# Patient Record
Sex: FEMALE | Race: WHITE
[De-identification: names, ages, dates, MRNs, and addresses within clinical notes are randomized per-mention and may not be internally consistent; named-entity substitution may affect disease eponyms.]

---

## 2021-07-14 ENCOUNTER — TRANSCRIPTION ENCOUNTER (OUTPATIENT)
Age: 61
End: 2021-07-14

## 2022-10-30 ENCOUNTER — INPATIENT (INPATIENT)
Facility: HOSPITAL | Age: 62
LOS: 5 days | Discharge: NURSING, REHAB OR LONG-TERM CARE | DRG: 53 | End: 2022-11-05
Attending: STUDENT IN AN ORGANIZED HEALTH CARE EDUCATION/TRAINING PROGRAM | Admitting: STUDENT IN AN ORGANIZED HEALTH CARE EDUCATION/TRAINING PROGRAM
Payer: COMMERCIAL

## 2022-10-30 VITALS
SYSTOLIC BLOOD PRESSURE: 137 MMHG | RESPIRATION RATE: 18 BRPM | DIASTOLIC BLOOD PRESSURE: 78 MMHG | TEMPERATURE: 99 F | OXYGEN SATURATION: 95 % | HEART RATE: 78 BPM

## 2022-10-30 DIAGNOSIS — Z98.890 OTHER SPECIFIED POSTPROCEDURAL STATES: Chronic | ICD-10-CM

## 2022-10-30 DIAGNOSIS — Z90.49 ACQUIRED ABSENCE OF OTHER SPECIFIED PARTS OF DIGESTIVE TRACT: Chronic | ICD-10-CM

## 2022-10-30 DIAGNOSIS — S19.80XA OTHER SPECIFIED INJURIES OF UNSPECIFIED PART OF NECK, INITIAL ENCOUNTER: ICD-10-CM

## 2022-10-30 DIAGNOSIS — Z90.89 ACQUIRED ABSENCE OF OTHER ORGANS: Chronic | ICD-10-CM

## 2022-10-30 LAB
ABO RH CONFIRMATION: SIGNIFICANT CHANGE UP
ALBUMIN SERPL ELPH-MCNC: 3.8 G/DL — SIGNIFICANT CHANGE UP (ref 3.3–5.2)
ALP SERPL-CCNC: 89 U/L — SIGNIFICANT CHANGE UP (ref 40–120)
ALT FLD-CCNC: 23 U/L — SIGNIFICANT CHANGE UP
ANION GAP SERPL CALC-SCNC: 10 MMOL/L — SIGNIFICANT CHANGE UP (ref 5–17)
ANION GAP SERPL CALC-SCNC: 13 MMOL/L — SIGNIFICANT CHANGE UP (ref 5–17)
APPEARANCE UR: CLEAR — SIGNIFICANT CHANGE UP
APTT BLD: 34 SEC — SIGNIFICANT CHANGE UP (ref 27.5–35.5)
AST SERPL-CCNC: 22 U/L — SIGNIFICANT CHANGE UP
BACTERIA # UR AUTO: ABNORMAL
BASOPHILS # BLD AUTO: 0.02 K/UL — SIGNIFICANT CHANGE UP (ref 0–0.2)
BASOPHILS # BLD AUTO: 0.04 K/UL — SIGNIFICANT CHANGE UP (ref 0–0.2)
BASOPHILS NFR BLD AUTO: 0.2 % — SIGNIFICANT CHANGE UP (ref 0–2)
BASOPHILS NFR BLD AUTO: 0.4 % — SIGNIFICANT CHANGE UP (ref 0–2)
BILIRUB SERPL-MCNC: 0.3 MG/DL — LOW (ref 0.4–2)
BILIRUB UR-MCNC: NEGATIVE — SIGNIFICANT CHANGE UP
BLD GP AB SCN SERPL QL: SIGNIFICANT CHANGE UP
BUN SERPL-MCNC: 10.6 MG/DL — SIGNIFICANT CHANGE UP (ref 8–20)
BUN SERPL-MCNC: 13.9 MG/DL — SIGNIFICANT CHANGE UP (ref 8–20)
CALCIUM SERPL-MCNC: 8.6 MG/DL — SIGNIFICANT CHANGE UP (ref 8.4–10.5)
CALCIUM SERPL-MCNC: 8.6 MG/DL — SIGNIFICANT CHANGE UP (ref 8.4–10.5)
CHLORIDE SERPL-SCNC: 104 MMOL/L — SIGNIFICANT CHANGE UP (ref 96–108)
CHLORIDE SERPL-SCNC: 105 MMOL/L — SIGNIFICANT CHANGE UP (ref 96–108)
CO2 SERPL-SCNC: 22 MMOL/L — SIGNIFICANT CHANGE UP (ref 22–29)
CO2 SERPL-SCNC: 24 MMOL/L — SIGNIFICANT CHANGE UP (ref 22–29)
COLOR SPEC: YELLOW — SIGNIFICANT CHANGE UP
COMMENT - URINE: SIGNIFICANT CHANGE UP
CREAT SERPL-MCNC: 0.57 MG/DL — SIGNIFICANT CHANGE UP (ref 0.5–1.3)
CREAT SERPL-MCNC: 0.6 MG/DL — SIGNIFICANT CHANGE UP (ref 0.5–1.3)
DIFF PNL FLD: ABNORMAL
EGFR: 100 ML/MIN/1.73M2 — SIGNIFICANT CHANGE UP
EGFR: 101 ML/MIN/1.73M2 — SIGNIFICANT CHANGE UP
EOSINOPHIL # BLD AUTO: 0.04 K/UL — SIGNIFICANT CHANGE UP (ref 0–0.5)
EOSINOPHIL # BLD AUTO: 0.05 K/UL — SIGNIFICANT CHANGE UP (ref 0–0.5)
EOSINOPHIL NFR BLD AUTO: 0.4 % — SIGNIFICANT CHANGE UP (ref 0–6)
EOSINOPHIL NFR BLD AUTO: 0.5 % — SIGNIFICANT CHANGE UP (ref 0–6)
FLUAV AG NPH QL: SIGNIFICANT CHANGE UP
FLUBV AG NPH QL: SIGNIFICANT CHANGE UP
GLUCOSE SERPL-MCNC: 119 MG/DL — HIGH (ref 70–99)
GLUCOSE SERPL-MCNC: 98 MG/DL — SIGNIFICANT CHANGE UP (ref 70–99)
GLUCOSE UR QL: NEGATIVE MG/DL — SIGNIFICANT CHANGE UP
HCT VFR BLD CALC: 38.3 % — SIGNIFICANT CHANGE UP (ref 34.5–45)
HCT VFR BLD CALC: 40.4 % — SIGNIFICANT CHANGE UP (ref 34.5–45)
HGB BLD-MCNC: 12.9 G/DL — SIGNIFICANT CHANGE UP (ref 11.5–15.5)
HGB BLD-MCNC: 13.6 G/DL — SIGNIFICANT CHANGE UP (ref 11.5–15.5)
IMM GRANULOCYTES NFR BLD AUTO: 0.2 % — SIGNIFICANT CHANGE UP (ref 0–0.9)
IMM GRANULOCYTES NFR BLD AUTO: 0.4 % — SIGNIFICANT CHANGE UP (ref 0–0.9)
INR BLD: 1.01 RATIO — SIGNIFICANT CHANGE UP (ref 0.88–1.16)
KETONES UR-MCNC: NEGATIVE — SIGNIFICANT CHANGE UP
LEUKOCYTE ESTERASE UR-ACNC: NEGATIVE — SIGNIFICANT CHANGE UP
LYMPHOCYTES # BLD AUTO: 1.87 K/UL — SIGNIFICANT CHANGE UP (ref 1–3.3)
LYMPHOCYTES # BLD AUTO: 16.6 % — SIGNIFICANT CHANGE UP (ref 13–44)
LYMPHOCYTES # BLD AUTO: 2.29 K/UL — SIGNIFICANT CHANGE UP (ref 1–3.3)
LYMPHOCYTES # BLD AUTO: 23.3 % — SIGNIFICANT CHANGE UP (ref 13–44)
MCHC RBC-ENTMCNC: 30.6 PG — SIGNIFICANT CHANGE UP (ref 27–34)
MCHC RBC-ENTMCNC: 31.2 PG — SIGNIFICANT CHANGE UP (ref 27–34)
MCHC RBC-ENTMCNC: 33.7 GM/DL — SIGNIFICANT CHANGE UP (ref 32–36)
MCHC RBC-ENTMCNC: 33.7 GM/DL — SIGNIFICANT CHANGE UP (ref 32–36)
MCV RBC AUTO: 91 FL — SIGNIFICANT CHANGE UP (ref 80–100)
MCV RBC AUTO: 92.5 FL — SIGNIFICANT CHANGE UP (ref 80–100)
MONOCYTES # BLD AUTO: 0.5 K/UL — SIGNIFICANT CHANGE UP (ref 0–0.9)
MONOCYTES # BLD AUTO: 0.77 K/UL — SIGNIFICANT CHANGE UP (ref 0–0.9)
MONOCYTES NFR BLD AUTO: 4.4 % — SIGNIFICANT CHANGE UP (ref 2–14)
MONOCYTES NFR BLD AUTO: 7.8 % — SIGNIFICANT CHANGE UP (ref 2–14)
NEUTROPHILS # BLD AUTO: 6.69 K/UL — SIGNIFICANT CHANGE UP (ref 1.8–7.4)
NEUTROPHILS # BLD AUTO: 8.78 K/UL — HIGH (ref 1.8–7.4)
NEUTROPHILS NFR BLD AUTO: 68 % — SIGNIFICANT CHANGE UP (ref 43–77)
NEUTROPHILS NFR BLD AUTO: 77.8 % — HIGH (ref 43–77)
NITRITE UR-MCNC: NEGATIVE — SIGNIFICANT CHANGE UP
PH UR: 5 — SIGNIFICANT CHANGE UP (ref 5–8)
PLATELET # BLD AUTO: 285 K/UL — SIGNIFICANT CHANGE UP (ref 150–400)
PLATELET # BLD AUTO: 309 K/UL — SIGNIFICANT CHANGE UP (ref 150–400)
POTASSIUM SERPL-MCNC: 4.3 MMOL/L — SIGNIFICANT CHANGE UP (ref 3.5–5.3)
POTASSIUM SERPL-MCNC: 4.5 MMOL/L — SIGNIFICANT CHANGE UP (ref 3.5–5.3)
POTASSIUM SERPL-SCNC: 4.3 MMOL/L — SIGNIFICANT CHANGE UP (ref 3.5–5.3)
POTASSIUM SERPL-SCNC: 4.5 MMOL/L — SIGNIFICANT CHANGE UP (ref 3.5–5.3)
PROT SERPL-MCNC: 6.3 G/DL — LOW (ref 6.6–8.7)
PROT UR-MCNC: NEGATIVE — SIGNIFICANT CHANGE UP
PROTHROM AB SERPL-ACNC: 11.7 SEC — SIGNIFICANT CHANGE UP (ref 10.5–13.4)
RBC # BLD: 4.14 M/UL — SIGNIFICANT CHANGE UP (ref 3.8–5.2)
RBC # BLD: 4.44 M/UL — SIGNIFICANT CHANGE UP (ref 3.8–5.2)
RBC # FLD: 13.2 % — SIGNIFICANT CHANGE UP (ref 10.3–14.5)
RBC # FLD: 13.3 % — SIGNIFICANT CHANGE UP (ref 10.3–14.5)
RBC CASTS # UR COMP ASSIST: SIGNIFICANT CHANGE UP /HPF (ref 0–4)
RSV RNA NPH QL NAA+NON-PROBE: SIGNIFICANT CHANGE UP
SARS-COV-2 RNA SPEC QL NAA+PROBE: SIGNIFICANT CHANGE UP
SODIUM SERPL-SCNC: 139 MMOL/L — SIGNIFICANT CHANGE UP (ref 135–145)
SODIUM SERPL-SCNC: 139 MMOL/L — SIGNIFICANT CHANGE UP (ref 135–145)
SP GR SPEC: 1.02 — SIGNIFICANT CHANGE UP (ref 1.01–1.02)
UROBILINOGEN FLD QL: 1 MG/DL
WBC # BLD: 11.27 K/UL — HIGH (ref 3.8–10.5)
WBC # BLD: 9.84 K/UL — SIGNIFICANT CHANGE UP (ref 3.8–10.5)
WBC # FLD AUTO: 11.27 K/UL — HIGH (ref 3.8–10.5)
WBC # FLD AUTO: 9.84 K/UL — SIGNIFICANT CHANGE UP (ref 3.8–10.5)
WBC UR QL: SIGNIFICANT CHANGE UP /HPF (ref 0–5)

## 2022-10-30 PROCEDURE — 12011 RPR F/E/E/N/L/M 2.5 CM/<: CPT

## 2022-10-30 PROCEDURE — 99221 1ST HOSP IP/OBS SF/LOW 40: CPT

## 2022-10-30 PROCEDURE — 99285 EMERGENCY DEPT VISIT HI MDM: CPT | Mod: 25

## 2022-10-30 PROCEDURE — 99232 SBSQ HOSP IP/OBS MODERATE 35: CPT

## 2022-10-30 PROCEDURE — 73562 X-RAY EXAM OF KNEE 3: CPT | Mod: 26,RT

## 2022-10-30 PROCEDURE — 72141 MRI NECK SPINE W/O DYE: CPT | Mod: 26

## 2022-10-30 RX ORDER — SODIUM CHLORIDE 9 MG/ML
1000 INJECTION, SOLUTION INTRAVENOUS ONCE
Refills: 0 | Status: COMPLETED | OUTPATIENT
Start: 2022-10-30 | End: 2022-10-30

## 2022-10-30 RX ORDER — KETOROLAC TROMETHAMINE 30 MG/ML
10 SYRINGE (ML) INJECTION EVERY 6 HOURS
Refills: 0 | Status: DISCONTINUED | OUTPATIENT
Start: 2022-10-30 | End: 2022-10-30

## 2022-10-30 RX ORDER — OXYCODONE HYDROCHLORIDE 5 MG/1
5 TABLET ORAL EVERY 4 HOURS
Refills: 0 | Status: DISCONTINUED | OUTPATIENT
Start: 2022-10-30 | End: 2022-11-03

## 2022-10-30 RX ORDER — SODIUM CHLORIDE 9 MG/ML
500 INJECTION INTRAMUSCULAR; INTRAVENOUS; SUBCUTANEOUS ONCE
Refills: 0 | Status: COMPLETED | OUTPATIENT
Start: 2022-10-30 | End: 2022-10-30

## 2022-10-30 RX ORDER — HYDROMORPHONE HYDROCHLORIDE 2 MG/ML
1 INJECTION INTRAMUSCULAR; INTRAVENOUS; SUBCUTANEOUS ONCE
Refills: 0 | Status: DISCONTINUED | OUTPATIENT
Start: 2022-10-30 | End: 2022-10-30

## 2022-10-30 RX ORDER — CHLORHEXIDINE GLUCONATE 213 G/1000ML
1 SOLUTION TOPICAL DAILY
Refills: 0 | Status: DISCONTINUED | OUTPATIENT
Start: 2022-10-30 | End: 2022-11-02

## 2022-10-30 RX ORDER — OXYCODONE HYDROCHLORIDE 5 MG/1
5 TABLET ORAL EVERY 6 HOURS
Refills: 0 | Status: DISCONTINUED | OUTPATIENT
Start: 2022-10-30 | End: 2022-10-30

## 2022-10-30 RX ORDER — ONDANSETRON 8 MG/1
4 TABLET, FILM COATED ORAL ONCE
Refills: 0 | Status: COMPLETED | OUTPATIENT
Start: 2022-10-30 | End: 2022-10-30

## 2022-10-30 RX ORDER — METOCLOPRAMIDE HCL 10 MG
10 TABLET ORAL ONCE
Refills: 0 | Status: COMPLETED | OUTPATIENT
Start: 2022-10-30 | End: 2022-10-30

## 2022-10-30 RX ORDER — HYDROMORPHONE HYDROCHLORIDE 2 MG/ML
0.5 INJECTION INTRAMUSCULAR; INTRAVENOUS; SUBCUTANEOUS
Refills: 0 | Status: DISCONTINUED | OUTPATIENT
Start: 2022-10-30 | End: 2022-10-31

## 2022-10-30 RX ORDER — ENOXAPARIN SODIUM 100 MG/ML
40 INJECTION SUBCUTANEOUS EVERY 24 HOURS
Refills: 0 | Status: DISCONTINUED | OUTPATIENT
Start: 2022-10-30 | End: 2022-11-05

## 2022-10-30 RX ORDER — OXYCODONE HYDROCHLORIDE 5 MG/1
10 TABLET ORAL EVERY 4 HOURS
Refills: 0 | Status: DISCONTINUED | OUTPATIENT
Start: 2022-10-30 | End: 2022-11-03

## 2022-10-30 RX ORDER — GABAPENTIN 400 MG/1
300 CAPSULE ORAL THREE TIMES A DAY
Refills: 0 | Status: DISCONTINUED | OUTPATIENT
Start: 2022-10-30 | End: 2022-10-31

## 2022-10-30 RX ORDER — ACETAMINOPHEN 500 MG
975 TABLET ORAL EVERY 8 HOURS
Refills: 0 | Status: DISCONTINUED | OUTPATIENT
Start: 2022-10-30 | End: 2022-11-03

## 2022-10-30 RX ORDER — METHOCARBAMOL 500 MG/1
750 TABLET, FILM COATED ORAL EVERY 8 HOURS
Refills: 0 | Status: DISCONTINUED | OUTPATIENT
Start: 2022-10-30 | End: 2022-11-05

## 2022-10-30 RX ORDER — CHLORHEXIDINE GLUCONATE 213 G/1000ML
1 SOLUTION TOPICAL
Refills: 0 | Status: DISCONTINUED | OUTPATIENT
Start: 2022-10-30 | End: 2022-10-30

## 2022-10-30 RX ORDER — SENNA PLUS 8.6 MG/1
2 TABLET ORAL AT BEDTIME
Refills: 0 | Status: DISCONTINUED | OUTPATIENT
Start: 2022-10-30 | End: 2022-11-05

## 2022-10-30 RX ORDER — INFLUENZA VIRUS VACCINE 15; 15; 15; 15 UG/.5ML; UG/.5ML; UG/.5ML; UG/.5ML
0.5 SUSPENSION INTRAMUSCULAR ONCE
Refills: 0 | Status: COMPLETED | OUTPATIENT
Start: 2022-10-30 | End: 2022-11-05

## 2022-10-30 RX ADMIN — ONDANSETRON 4 MILLIGRAM(S): 8 TABLET, FILM COATED ORAL at 04:38

## 2022-10-30 RX ADMIN — SENNA PLUS 2 TABLET(S): 8.6 TABLET ORAL at 22:07

## 2022-10-30 RX ADMIN — OXYCODONE HYDROCHLORIDE 5 MILLIGRAM(S): 5 TABLET ORAL at 11:36

## 2022-10-30 RX ADMIN — HYDROMORPHONE HYDROCHLORIDE 1 MILLIGRAM(S): 2 INJECTION INTRAMUSCULAR; INTRAVENOUS; SUBCUTANEOUS at 02:07

## 2022-10-30 RX ADMIN — SODIUM CHLORIDE 500 MILLILITER(S): 9 INJECTION INTRAMUSCULAR; INTRAVENOUS; SUBCUTANEOUS at 16:06

## 2022-10-30 RX ADMIN — GABAPENTIN 300 MILLIGRAM(S): 400 CAPSULE ORAL at 18:01

## 2022-10-30 RX ADMIN — HYDROMORPHONE HYDROCHLORIDE 0.5 MILLIGRAM(S): 2 INJECTION INTRAMUSCULAR; INTRAVENOUS; SUBCUTANEOUS at 22:40

## 2022-10-30 RX ADMIN — ONDANSETRON 4 MILLIGRAM(S): 8 TABLET, FILM COATED ORAL at 02:34

## 2022-10-30 RX ADMIN — HYDROMORPHONE HYDROCHLORIDE 0.5 MILLIGRAM(S): 2 INJECTION INTRAMUSCULAR; INTRAVENOUS; SUBCUTANEOUS at 22:08

## 2022-10-30 RX ADMIN — METHOCARBAMOL 750 MILLIGRAM(S): 500 TABLET, FILM COATED ORAL at 22:07

## 2022-10-30 RX ADMIN — GABAPENTIN 300 MILLIGRAM(S): 400 CAPSULE ORAL at 22:08

## 2022-10-30 RX ADMIN — SODIUM CHLORIDE 1000 MILLILITER(S): 9 INJECTION, SOLUTION INTRAVENOUS at 11:45

## 2022-10-30 RX ADMIN — METHOCARBAMOL 750 MILLIGRAM(S): 500 TABLET, FILM COATED ORAL at 16:55

## 2022-10-30 RX ADMIN — HYDROMORPHONE HYDROCHLORIDE 1 MILLIGRAM(S): 2 INJECTION INTRAMUSCULAR; INTRAVENOUS; SUBCUTANEOUS at 08:46

## 2022-10-30 RX ADMIN — ENOXAPARIN SODIUM 40 MILLIGRAM(S): 100 INJECTION SUBCUTANEOUS at 22:08

## 2022-10-30 RX ADMIN — OXYCODONE HYDROCHLORIDE 5 MILLIGRAM(S): 5 TABLET ORAL at 12:06

## 2022-10-30 RX ADMIN — HYDROMORPHONE HYDROCHLORIDE 0.5 MILLIGRAM(S): 2 INJECTION INTRAMUSCULAR; INTRAVENOUS; SUBCUTANEOUS at 14:30

## 2022-10-30 RX ADMIN — HYDROMORPHONE HYDROCHLORIDE 1 MILLIGRAM(S): 2 INJECTION INTRAMUSCULAR; INTRAVENOUS; SUBCUTANEOUS at 04:06

## 2022-10-30 RX ADMIN — HYDROMORPHONE HYDROCHLORIDE 0.5 MILLIGRAM(S): 2 INJECTION INTRAMUSCULAR; INTRAVENOUS; SUBCUTANEOUS at 13:50

## 2022-10-30 RX ADMIN — Medication 975 MILLIGRAM(S): at 13:13

## 2022-10-30 RX ADMIN — HYDROMORPHONE HYDROCHLORIDE 0.5 MILLIGRAM(S): 2 INJECTION INTRAMUSCULAR; INTRAVENOUS; SUBCUTANEOUS at 18:16

## 2022-10-30 RX ADMIN — HYDROMORPHONE HYDROCHLORIDE 0.5 MILLIGRAM(S): 2 INJECTION INTRAMUSCULAR; INTRAVENOUS; SUBCUTANEOUS at 18:01

## 2022-10-30 RX ADMIN — Medication 10 MILLIGRAM(S): at 03:09

## 2022-10-30 RX ADMIN — CHLORHEXIDINE GLUCONATE 1 APPLICATION(S): 213 SOLUTION TOPICAL at 22:10

## 2022-10-30 RX ADMIN — HYDROMORPHONE HYDROCHLORIDE 1 MILLIGRAM(S): 2 INJECTION INTRAMUSCULAR; INTRAVENOUS; SUBCUTANEOUS at 07:52

## 2022-10-30 NOTE — H&P ADULT - ASSESSMENT
A: 63 yo F transfer from [facility] , s/p fall in drive way. Pan scan negative for any acute traumatic injuries.However, she complains of RUE numbness and tingling in bilateral hands. MRI completed, pending final read. She is noted to have a small mucosal lip laceration s/p repair by ED.     Plan:   - Follow up MRI, final read   - NSGY consulted, follow up recommendation  - Hemodynamic monitoring   - Final recommendations, pending further imaging

## 2022-10-30 NOTE — CONSULT NOTE ADULT - NS PANP COMMENT GEN_ALL_CORE FT
The injury and MRI findings were explained in details to the pt. Also the management plan was made clear to her.. I will close follow.

## 2022-10-30 NOTE — ED ADULT NURSE NOTE - OBJECTIVE STATEMENT
Assumed care of pt at 0120. Pt A&Ox4 c/o bilateral arm/thumb/wrist pain, the pt is a transfer from [facility], the pt states that she was walking when she tripped and fell landing on her right hand, the pt complains of a burning pain from her right wrist down to her fingers, the pt denies any LOC/N/V/D/CP/SOB, the pt has an abrasion to the bridge of the nose, the pt received 75mcg fentanyl, 0.5mg Dilaudid, the pt was sent here for an MRI,

## 2022-10-30 NOTE — H&P ADULT - HISTORY OF PRESENT ILLNESS
Trauma Surgery     Consulting attending: Dr. Chakraborty       HPI: Ms. Scott is a 63 yo F who presented to ED s/p mechanical GLF while in her driveway. Patient hit her face on the ground, landing face forward on her arm. Patient denies any LOC. + Headstrike. Not on ASA/AC. She presented to [facility] [place] [place] with complaints of bilateral numbness/tingling in hands, as well as RLE numbness/weakness (she does endorse previous  radiculopathy s/p melanoma excision). CT head/max/face/ A/P negative for any acute traumatic injuries. CT C-spine demonstrates C4/C5 spinal stenosis. Patient was transferred to [facility] for MRI, and further evaluation. On presentation, patient primary intact, GCS 15. HDS. She is noted to have a small mucosal lip laceration s/p repair by EDm sensory defect in bilateral hands. Otherwise, intact. MRI completed, pending final read. NSGY consulted for evaluation.         PAST MEDICAL HISTORY:  Melanoma s/p excision   Radiculopathy           PAST SURGICAL HISTORY:  Melanoma excision   [place]    MEDICATIONS:        ALLERGIES:  No Known Allergies

## 2022-10-30 NOTE — H&P ADULT - NSHPLABSRESULTS_GEN_ALL_CORE
LABS:                        13.6   11.27 )-----------( 309      ( 30 Oct 2022 01:42 )             40.4     10-30    139  |  104  |  13.9  ----------------------------<  119<H>  4.3   |  22.0  |  0.60    Ca    8.6      30 Oct 2022 01:42    TPro  6.3<L>  /  Alb  3.8  /  TBili  0.3<L>  /  DBili  x   /  AST  22  /  ALT  23  /  AlkPhos  89  10-30    Lactate:    PT/INR - ( 30 Oct 2022 01:42 )   PT: 11.7 sec;   INR: 1.01 ratio         PTT - ( 30 Oct 2022 01:42 )  PTT:34.0 sec                IMAGING:

## 2022-10-30 NOTE — CHART NOTE - NSCHARTNOTEFT_GEN_A_CORE
Neurosurgery    Pt seen with DR Bullock.     S/p fall with immediate sensational changes to both upper extremities, that has improved but now has extreme burning to both hands & sensitive to touch & movement.     MR Cervical Spine No Cont (10.30.22 @ 03:56)   Cord compression at C3/C4 and C4/C5 levels without cord signal   abnormality on T2 sagittal.  Likely Modic endplate degenerative signal   changes at C4/C5 level.     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     1. Trauma ICU admit  2. Keep MAP > 80, preferably between 80 & 85  3. Neuro checks Q 1 hour  4. Cervical collar ordered. Will be properly fit & new collar placed by orthotist

## 2022-10-30 NOTE — ED ADULT NURSE NOTE - CHIEF COMPLAINT QUOTE
patient transfer from [facility] [place], states that she was walking when she tripped and fell landing on her right hand. patient with complaints of a burning pain from her right wrist down to her fingers. patient denies any LOC. patient with abrasion to bridge of nose, received 75mcg fentanyl, 0.5mg dilaudid. Patient sent for a STAT MRI.

## 2022-10-30 NOTE — CHART NOTE - NSCHARTNOTEFT_GEN_A_CORE
Patient was fit and delivered a cervical orthosis occipital mandibular support with additional soft sleeve protection. The collar will stabilize and control the cervical spine limit ROM, and facilitate healing. Lynne was educated on the care use and function of the orthosis. Contact info was given to patient. All went without incident.   Santos Elias [facility]  [phone]

## 2022-10-30 NOTE — PROGRESS NOTE ADULT - SUBJECTIVE AND OBJECTIVE BOX
I have seen and examined the patient during SICU multidisciplinary rounds from 0527-0633 hrs.   Events noted.    Neuro: Awake, persisting central digit dysesthesias no gross motor deficit on my exam  CV: HD normal;  Pulm: SaO2 Adequate  GI: And soft, dry mucous membranes  : urine flow adequate dry mucous membranes electrolytes ok   ID: no issues  Heme: h/H stable, DCS for vTe prophyalxys  Endo: target glycemia < 180    Plan:  MRI C3 toC5 cord compression without signal abnormality gross only sensory symptomatology no motor deficit..  Agree with transfer wit ICI for BP ( traget MAP > 80 x 48 hrs)  Bed side swallow, aspen collar.  diet ok.  Ok for dvt chemoprophyalxys ( no hematomas on MRI)_ I have seen and examined the patient during SICU multidisciplinary rounds from 2133-6194 hrs.   Events noted.    Neuro: Awake, persisting central digit dysesthesias no gross motor deficit on my exam  CV: HD normal;  Pulm: SaO2 Adequate  GI: And soft, dry mucous membranes  : urine flow adequate dry mucous membranes electrolytes ok   ID: no issues  Heme: h/H stable, DCS for vTe prophyalxys  Endo: target glycemia < 180    Plan:  MRI C3 toC5 cord compression without signal abnormality gross only sensory symptomatology no motor deficit..  Agree with transfer to ICU for BP ( traget MAP > 80 x 48 hrs)  Bed side swallow, aspen collar.  diet ok.  Ok for dvt chemoprophyalxys ( no hematomas on MRI)_

## 2022-10-30 NOTE — CONSULT NOTE ADULT - SUBJECTIVE AND OBJECTIVE BOX
Patient is a 62y old  Female who presents with a chief complaint of   HPI:      HISTORY OF PRESENT ILLNESS:   62yF PMH     PAST MEDICAL & SURGICAL HISTORY:  No pertinent past medical history        FAMILY HISTORY:      SOCIAL HISTORY:  Tobacco Use:  EtOH use:   Substance:    Allergies    No Known Allergies    Intolerances        REVIEW OF SYSTEMS  Negative except as noted in HPI  CONSTITUTIONAL: No fever, weight loss, or fatigue  EYES: No eye pain, visual disturbances, or discharge  ENMT:  No difficulty hearing, tinnitus, vertigo; No sinus or throat pain  NECK: No pain or stiffness  BREASTS: No pain, masses, or nipple discharge  RESPIRATORY: No cough, wheezing, chills or hemoptysis; No shortness of breath  CARDIOVASCULAR: No chest pain, palpitations, dizziness, or leg swelling  GASTROINTESTINAL: No abdominal or epigastric pain. No nausea, vomiting, or hematemesis; No diarrhea or constipation. No melena or hematochezia.  GENITOURINARY: No dysuria, frequency, hematuria, or incontinence  NEUROLOGICAL: No headaches, memory loss, loss of strength, numbness, or tremors  SKIN: No itching, burning, rashes, or lesions   LYMPH NODES: No enlarged glands  ENDOCRINE: No heat or cold intolerance; No hair loss  MUSCULOSKELETAL: No joint pain or swelling; No muscle, back, or extremity pain  PSYCHIATRIC: No depression, anxiety, mood swings, or difficulty sleeping  HEME/LYMPH: No easy bruising, or bleeding gums  ALLERY AND IMMUNOLOGIC: No hives or eczema    HOME MEDICATIONS:  Home Medications:      MEDICATIONS:  Antibiotics:    Neuro:    Anticoagulation:    OTHER:    IVF:      Vital Signs Last 24 Hrs  T(C): 36.4 (30 Oct 2022 04:30), Max: 37 (30 Oct 2022 01:13)  T(F): 97.6 (30 Oct 2022 04:30), Max: 98.6 (30 Oct 2022 01:13)  HR: 58 (30 Oct 2022 04:30) (58 - 78)  BP: 133/81 (30 Oct 2022 04:30) (133/81 - 137/78)  BP(mean): --  RR: 19 (30 Oct 2022 04:30) (18 - 19)  SpO2: 95% (30 Oct 2022 04:30) (95% - 95%)    Parameters below as of 30 Oct 2022 04:30  Patient On (Oxygen Delivery Method): room air          PHYSICAL EXAM:  GENERAL: NAD, well-groomed, well-developed  HEAD:  Atraumatic, normocephalic  DRAINS:   WOUND: Dressing clean dry intact; well healed  SHUNT: easily compressible and refills  EYES: Conjunctiva and sclera clear; corneal reflex intact  ENMT: No tonsillar erythema, exudates, or enlargement; moist mucous membranes, good dentition, no lesions  NECK: Supple, no JVD, dormal thyroid  YANICK COMA SCORE: E- V- M- =       E: 4= opens eyes spontaneously 3= to voice 2= to noxious 1= no opening       V: 5= oriented 4= confused 3= inappropriate words 2= incomprehensible sounds 1= nonverbal 1T= intubated       M: 6= follows commands 5= localizes 4= withdraws 3= flexor posturing 2= extensor posturing 1= no movement  MENTAL STATUS: AAO x3; Awake/Comatose; Opens eyes spontaneously/to voice/to light touch/to noxious stimuli; Appropriately conversant without aphasia/Nonverbal; following simple commands/mimicking/not following commands  CRANIAL NERVES: Visual acuity normal for age, visual fields full to confrontation, PERRL. EOMI without nystagmus. Facial sensation intact V1-3 distribution b/l. Face symmetric w/ normal eye closure and smile, tongue midline. Hearing grossly intact. Speech clear. Head turning and shoulder shrug intact.   REFLEXES: PERRL. Corneals intact b/l. Gag intact. Cough intact. Oculocephalic reflex intact (Doll's eye). Negative Cuenca's b/l. Negative clonus b/l  MOTOR: strength 5/5 b/l upper and lower extremities  Uppers     Delt (C5/6)     Bicep (C5/6)     Wrist Extend (C6)     Tricep (C7)     HG (C8/T1)  R                     5/5                 5/5                         5/5                           5/5                   5/5  L                      5/5                 5/5                         5/5                           5/5                   5/5  Lowers      HF(L1/L2)     KE (L3)     DF (L4)     EHL (L5)     PF (S1)      R                     5/5              5/5           5/5           5/5            5/5  L                     5/5               5/5          5/5            5/5            5/5  SENSATION: grossly intact to light touch all extremities  COORDINATION: Gait intact; rapid alternating movements intact b/l upper extremities; no upper extremity dysmetria  MUSCLE STRETCH REFLEXES: DTRs 2+ intact and symmetric  PLANTAR: upgoing/downgoing/mute (Babinski)  CHEST/LUNG: Clear to auscultation bilaterally; no rales, rhonchi, wheezing, or rubs  HEART: +S1/+S2; Regular rate and rhythm; no murmurs, rubs, or gallops  ABDOMEN: Soft, nontender, nondistended; bowel sounds present all four quadrants  EXTREMITIES:  2+ peripheral pulses, no clubbing, cyanosis, or edema  LYMPH: No lymphadenopathy noted  SKIN: Warm, dry; no rashes or lesions    LABS:                        13.6   11.27 )-----------( 309      ( 30 Oct 2022 01:42 )             40.4     10-30    139  |  104  |  13.9  ----------------------------<  119<H>  4.3   |  22.0  |  0.60    Ca    8.6      30 Oct 2022 01:42    TPro  6.3<L>  /  Alb  3.8  /  TBili  0.3<L>  /  DBili  x   /  AST  22  /  ALT  23  /  AlkPhos  89  10-30    PT/INR - ( 30 Oct 2022 01:42 )   PT: 11.7 sec;   INR: 1.01 ratio         PTT - ( 30 Oct 2022 01:42 )  PTT:34.0 sec      CULTURES:      RADIOLOGY & ADDITIONAL STUDIES:      CAPRINI SCORE [CLOT]:  Patient has an estimated Caprini score of greater than 5.  However, the patient's unique clinical situation will be addressed in an individual manner to determine appropriate anticoagulation treatment, if any. Patient is a 62y old  Female who presents with a chief complaint of b/l burning hand pain.  HPI: 62F no significant PMH presented [place] [facility] [place] forward with head strike, no LOC or AC/AP use. Patient was walking towards her home and tripped over the brick walk way. CT C spine done at [facility] [place] which revealed C4-C5 spinal stenosis, patient transferred to [facility] for MRI & trauma/neurosurgical evaluation. Patient admits to b/l burning hand pain, denies numbness/tingling of the LE, urinary or bowel retention or incontinence.    PAST MEDICAL & SURGICAL HISTORY:  No pertinent past medical history    Allergies  No Known Allergies    REVIEW OF SYSTEMS  Negative except as noted in HPI    HOME MEDICATIONS:  Home Medications:      MEDICATIONS:  Antibiotics:    Neuro:    Anticoagulation:    OTHER:    IVF:    Vital Signs Last 24 Hrs  T(C): 36.4 (30 Oct 2022 04:30), Max: 37 (30 Oct 2022 01:13)  T(F): 97.6 (30 Oct 2022 04:30), Max: 98.6 (30 Oct 2022 01:13)  HR: 58 (30 Oct 2022 04:30) (58 - 78)  BP: 133/81 (30 Oct 2022 04:30) (133/81 - 137/78)  BP(mean): --  RR: 19 (30 Oct 2022 04:30) (18 - 19)  SpO2: 95% (30 Oct 2022 04:30) (95% - 95%)    Parameters below as of 30 Oct 2022 04:30  Patient On (Oxygen Delivery Method): room air    PHYSICAL EXAM:  GENERAL: NAD, well-groomed, c-collar on  HEAD:  facial lacs, normocephalic  MENTAL STATUS: AAO x3; Awake. Opens eyes spontaneously. Appropriately conversant without aphasia, following simple commands.  CRANIAL NERVES: Visual acuity normal for age, visual fields full to confrontation, PERRL. EOMI without nystagmus. Facial sensation intact V1-3 distribution b/l. Face symmetric w/ normal eye closure and smile, tongue midline. Hearing grossly intact. Speech clear.   MOTOR: strength 5/5 b/l upper and lower extremities  SENSATION: grossly intact to light touch all extremities, increased sensation b/l hands among the lateral aspect    LABS:                        13.6   11.27 )-----------( 309      ( 30 Oct 2022 01:42 )             40.4     10-30    139  |  104  |  13.9  ----------------------------<  119<H>  4.3   |  22.0  |  0.60    Ca    8.6      30 Oct 2022 01:42    TPro  6.3<L>  /  Alb  3.8  /  TBili  0.3<L>  /  DBili  x   /  AST  22  /  ALT  23  /  AlkPhos  89  10-30    PT/INR - ( 30 Oct 2022 01:42 )   PT: 11.7 sec;   INR: 1.01 ratio         PTT - ( 30 Oct 2022 01:42 )  PTT:34.0 sec      RADIOLOGY & ADDITIONAL STUDIES:  MRI C-spine pending official read    CAPRINI SCORE [CLOT]:  Patient has an estimated Caprini score of greater than 5.  However, the patient's unique clinical situation will be addressed in an individual manner to determine appropriate anticoagulation treatment, if any.

## 2022-10-30 NOTE — ED PROVIDER NOTE - CLINICAL SUMMARY MEDICAL DECISION MAKING FREE TEXT BOX
Apparent hyperextension to the cervical spine with negative CT, maintain C-collar, urgent MR ordered. Will be admitted to trauma service.

## 2022-10-30 NOTE — H&P ADULT - NSHPPHYSICALEXAM_GEN_ALL_CORE
VITALS & I/Os:  Vital Signs Last 24 Hrs  T(C): 36.4 (30 Oct 2022 04:30), Max: 37 (30 Oct 2022 01:13)  T(F): 97.6 (30 Oct 2022 04:30), Max: 98.6 (30 Oct 2022 01:13)  HR: 58 (30 Oct 2022 04:30) (58 - 78)  BP: 133/81 (30 Oct 2022 04:30) (133/81 - 137/78)  BP(mean): --  RR: 19 (30 Oct 2022 04:30) (18 - 19)  SpO2: 95% (30 Oct 2022 04:30) (95% - 95%)    Parameters below as of 30 Oct 2022 04:30  Patient On (Oxygen Delivery Method): room air        I&O's Summary    PHYSICAL EXAM:  Constitutional: patient resting comfortably in bed, in no acute distress  HEENT: EOMI / PERRL b/l, no active drainage or redness, dried blood at nares, lip laceration s/p repair   Neck: No JVD, cervical collar in place, point tenderness   Respiratory: respirations are unlabored, no accessory muscle use, no conversational dyspnea, no chest wall tenderness   Cardiovascular: regular rate & rhythm  Gastrointestinal: Abdomen soft, non-tender, non-distended, no rebound tenderness / guarding  Neurological: GCS: 15 (4/5/6).  no gross sensory / motor / coordination deficits, RUE weakness (4/5), bilateral sensory defects in b/l hands. LUE motor intact.   Musculoskeletal: No joint pain, swelling or deformity; no limitation of movement

## 2022-10-30 NOTE — ED ADULT NURSE REASSESSMENT NOTE - NS ED NURSE REASSESS COMMENT FT1
rcvd pt A&OX4, c-collar in place, resp even and unlabored no distress noted, cardiac monitor in place showing SB, pt c/o bilat hand pain and hypersensitivity... pt medicated as ordered, pt repositioned and linen changed for pt comfort with x3RNs with c-spine alinement maintained,  pt placed in hospital gown, pt in lowest position and side rails up rcvd pt A&OX4, c-collar in place, resp even and unlabored no distress noted, cardiac monitor in place showing NSR, pt c/o bilat hand pain and hypersensitivity... pt medicated as ordered, pt repositioned and linen changed for pt comfort with x3RNs with c-spine alinement maintained,  pt placed in hospital gown, pt in lowest position and side rails up

## 2022-10-30 NOTE — H&P ADULT - ATTENDING COMMENTS
Agree with above.  62F presents with GLF to [facility] [place].  She was pan-imaged there and found to have a C3-C4 cervical stenosis and hyperalgesia in her left hand.  MRI was performed to look for possible central cord syndrome.      -Will f/u MRI C-spine  -Plans per NSx  -Admission pending imaging results

## 2022-10-30 NOTE — ED PROVIDER NOTE - OBJECTIVE STATEMENT
62yof stumbled and fell face forward in her driveway, striking her face on the ground, denies any LOC but had immediate onset of RUE pain and numbness that has since evolved into a burning pain in the both hands to the radial side. Denies any weakness. CTs negative at [facility] [place]. transferred for emergent MR and trauma surgery

## 2022-10-30 NOTE — ED ADULT TRIAGE NOTE - CHIEF COMPLAINT QUOTE
[place] [facility] [place] that she was walking when she tripped and fell landing on her right hand. patient with complaints of a burning pain from her right wrist down to her fingers. patient denies any LOC. patient with abrasion to bridge of nose, received 75mcg fentanyl, 0.5mg dilaudid. Patient sent for a STAT MRI.

## 2022-10-30 NOTE — ED ADULT NURSE REASSESSMENT NOTE - NS ED NURSE REASSESS COMMENT FT1
pt resting comfortably showing no signs of respiratory distress or pain, the pt is calm and cooperative

## 2022-10-30 NOTE — PATIENT PROFILE ADULT - FALL HARM RISK - RISK INTERVENTIONS
Assistance OOB with selected safe patient handling equipment/Communicate Fall Risk and Risk Factors to all staff, patient, and family/Monitor gait and stability/Reinforce activity limits and safety measures with patient and family/Visual Cue: Yellow wristband/Bed in lowest position, wheels locked, appropriate side rails in place/Call bell, personal items and telephone in reach/Instruct patient to call for assistance before getting out of bed or chair/Non-slip footwear when patient is out of bed/[place] to call system/Physically safe environment - no spills, clutter or unnecessary equipment/Purposeful Proactive Rounding/Room/bathroom lighting operational, light cord in reach

## 2022-10-31 LAB
ANION GAP SERPL CALC-SCNC: 9 MMOL/L — SIGNIFICANT CHANGE UP (ref 5–17)
BASOPHILS # BLD AUTO: 0.03 K/UL — SIGNIFICANT CHANGE UP (ref 0–0.2)
BASOPHILS NFR BLD AUTO: 0.4 % — SIGNIFICANT CHANGE UP (ref 0–2)
BUN SERPL-MCNC: 9.2 MG/DL — SIGNIFICANT CHANGE UP (ref 8–20)
CALCIUM SERPL-MCNC: 8.1 MG/DL — LOW (ref 8.4–10.5)
CHLORIDE SERPL-SCNC: 105 MMOL/L — SIGNIFICANT CHANGE UP (ref 96–108)
CO2 SERPL-SCNC: 24 MMOL/L — SIGNIFICANT CHANGE UP (ref 22–29)
CREAT SERPL-MCNC: 0.51 MG/DL — SIGNIFICANT CHANGE UP (ref 0.5–1.3)
EGFR: 104 ML/MIN/1.73M2 — SIGNIFICANT CHANGE UP
EOSINOPHIL # BLD AUTO: 0.12 K/UL — SIGNIFICANT CHANGE UP (ref 0–0.5)
EOSINOPHIL NFR BLD AUTO: 1.6 % — SIGNIFICANT CHANGE UP (ref 0–6)
GLUCOSE SERPL-MCNC: 92 MG/DL — SIGNIFICANT CHANGE UP (ref 70–99)
HCT VFR BLD CALC: 36.6 % — SIGNIFICANT CHANGE UP (ref 34.5–45)
HCV AB S/CO SERPL IA: 0.1 S/CO — SIGNIFICANT CHANGE UP (ref 0–0.99)
HCV AB SERPL-IMP: SIGNIFICANT CHANGE UP
HGB BLD-MCNC: 12.2 G/DL — SIGNIFICANT CHANGE UP (ref 11.5–15.5)
IMM GRANULOCYTES NFR BLD AUTO: 0.3 % — SIGNIFICANT CHANGE UP (ref 0–0.9)
LYMPHOCYTES # BLD AUTO: 3.21 K/UL — SIGNIFICANT CHANGE UP (ref 1–3.3)
LYMPHOCYTES # BLD AUTO: 43.1 % — SIGNIFICANT CHANGE UP (ref 13–44)
MCHC RBC-ENTMCNC: 31.2 PG — SIGNIFICANT CHANGE UP (ref 27–34)
MCHC RBC-ENTMCNC: 33.3 GM/DL — SIGNIFICANT CHANGE UP (ref 32–36)
MCV RBC AUTO: 93.6 FL — SIGNIFICANT CHANGE UP (ref 80–100)
MONOCYTES # BLD AUTO: 0.62 K/UL — SIGNIFICANT CHANGE UP (ref 0–0.9)
MONOCYTES NFR BLD AUTO: 8.3 % — SIGNIFICANT CHANGE UP (ref 2–14)
MRSA PCR RESULT.: SIGNIFICANT CHANGE UP
NEUTROPHILS # BLD AUTO: 3.45 K/UL — SIGNIFICANT CHANGE UP (ref 1.8–7.4)
NEUTROPHILS NFR BLD AUTO: 46.3 % — SIGNIFICANT CHANGE UP (ref 43–77)
PLATELET # BLD AUTO: 274 K/UL — SIGNIFICANT CHANGE UP (ref 150–400)
POTASSIUM SERPL-MCNC: 6 MMOL/L — HIGH (ref 3.5–5.3)
POTASSIUM SERPL-SCNC: 6 MMOL/L — HIGH (ref 3.5–5.3)
RBC # BLD: 3.91 M/UL — SIGNIFICANT CHANGE UP (ref 3.8–5.2)
RBC # FLD: 13.7 % — SIGNIFICANT CHANGE UP (ref 10.3–14.5)
S AUREUS DNA NOSE QL NAA+PROBE: DETECTED
SODIUM SERPL-SCNC: 138 MMOL/L — SIGNIFICANT CHANGE UP (ref 135–145)
VIT B12 SERPL-MCNC: 285 PG/ML — SIGNIFICANT CHANGE UP (ref 232–1245)
WBC # BLD: 7.45 K/UL — SIGNIFICANT CHANGE UP (ref 3.8–10.5)
WBC # FLD AUTO: 7.45 K/UL — SIGNIFICANT CHANGE UP (ref 3.8–10.5)

## 2022-10-31 PROCEDURE — 99291 CRITICAL CARE FIRST HOUR: CPT

## 2022-10-31 PROCEDURE — 99232 SBSQ HOSP IP/OBS MODERATE 35: CPT

## 2022-10-31 RX ORDER — BACITRACIN ZINC 500 UNIT/G
1 OINTMENT IN PACKET (EA) TOPICAL
Refills: 0 | Status: DISCONTINUED | OUTPATIENT
Start: 2022-10-31 | End: 2022-11-05

## 2022-10-31 RX ORDER — HYDROMORPHONE HYDROCHLORIDE 2 MG/ML
0.5 INJECTION INTRAMUSCULAR; INTRAVENOUS; SUBCUTANEOUS EVERY 4 HOURS
Refills: 0 | Status: DISCONTINUED | OUTPATIENT
Start: 2022-10-31 | End: 2022-11-02

## 2022-10-31 RX ORDER — GABAPENTIN 400 MG/1
600 CAPSULE ORAL THREE TIMES A DAY
Refills: 0 | Status: DISCONTINUED | OUTPATIENT
Start: 2022-10-31 | End: 2022-11-02

## 2022-10-31 RX ORDER — ACETAMINOPHEN 500 MG
1000 TABLET ORAL ONCE
Refills: 0 | Status: COMPLETED | OUTPATIENT
Start: 2022-10-31 | End: 2022-10-31

## 2022-10-31 RX ORDER — LAMOTRIGINE 25 MG/1
25 TABLET, ORALLY DISINTEGRATING ORAL EVERY 12 HOURS
Refills: 0 | Status: DISCONTINUED | OUTPATIENT
Start: 2022-10-31 | End: 2022-11-01

## 2022-10-31 RX ORDER — SODIUM CHLORIDE 9 MG/ML
1000 INJECTION, SOLUTION INTRAVENOUS
Refills: 0 | Status: DISCONTINUED | OUTPATIENT
Start: 2022-10-31 | End: 2022-11-01

## 2022-10-31 RX ADMIN — Medication 1000 MILLIGRAM(S): at 08:37

## 2022-10-31 RX ADMIN — ENOXAPARIN SODIUM 40 MILLIGRAM(S): 100 INJECTION SUBCUTANEOUS at 21:30

## 2022-10-31 RX ADMIN — Medication 1 APPLICATION(S): at 17:15

## 2022-10-31 RX ADMIN — HYDROMORPHONE HYDROCHLORIDE 0.5 MILLIGRAM(S): 2 INJECTION INTRAMUSCULAR; INTRAVENOUS; SUBCUTANEOUS at 18:55

## 2022-10-31 RX ADMIN — METHOCARBAMOL 750 MILLIGRAM(S): 500 TABLET, FILM COATED ORAL at 13:06

## 2022-10-31 RX ADMIN — SENNA PLUS 2 TABLET(S): 8.6 TABLET ORAL at 21:30

## 2022-10-31 RX ADMIN — HYDROMORPHONE HYDROCHLORIDE 0.5 MILLIGRAM(S): 2 INJECTION INTRAMUSCULAR; INTRAVENOUS; SUBCUTANEOUS at 03:11

## 2022-10-31 RX ADMIN — GABAPENTIN 300 MILLIGRAM(S): 400 CAPSULE ORAL at 06:16

## 2022-10-31 RX ADMIN — OXYCODONE HYDROCHLORIDE 10 MILLIGRAM(S): 5 TABLET ORAL at 13:47

## 2022-10-31 RX ADMIN — OXYCODONE HYDROCHLORIDE 10 MILLIGRAM(S): 5 TABLET ORAL at 17:15

## 2022-10-31 RX ADMIN — Medication 400 MILLIGRAM(S): at 08:07

## 2022-10-31 RX ADMIN — HYDROMORPHONE HYDROCHLORIDE 0.5 MILLIGRAM(S): 2 INJECTION INTRAMUSCULAR; INTRAVENOUS; SUBCUTANEOUS at 14:09

## 2022-10-31 RX ADMIN — METHOCARBAMOL 750 MILLIGRAM(S): 500 TABLET, FILM COATED ORAL at 06:16

## 2022-10-31 RX ADMIN — GABAPENTIN 600 MILLIGRAM(S): 400 CAPSULE ORAL at 13:06

## 2022-10-31 RX ADMIN — OXYCODONE HYDROCHLORIDE 10 MILLIGRAM(S): 5 TABLET ORAL at 08:08

## 2022-10-31 RX ADMIN — METHOCARBAMOL 750 MILLIGRAM(S): 500 TABLET, FILM COATED ORAL at 21:30

## 2022-10-31 RX ADMIN — HYDROMORPHONE HYDROCHLORIDE 0.5 MILLIGRAM(S): 2 INJECTION INTRAMUSCULAR; INTRAVENOUS; SUBCUTANEOUS at 03:41

## 2022-10-31 RX ADMIN — HYDROMORPHONE HYDROCHLORIDE 0.5 MILLIGRAM(S): 2 INJECTION INTRAMUSCULAR; INTRAVENOUS; SUBCUTANEOUS at 19:25

## 2022-10-31 RX ADMIN — OXYCODONE HYDROCHLORIDE 10 MILLIGRAM(S): 5 TABLET ORAL at 07:49

## 2022-10-31 RX ADMIN — OXYCODONE HYDROCHLORIDE 10 MILLIGRAM(S): 5 TABLET ORAL at 22:38

## 2022-10-31 RX ADMIN — GABAPENTIN 600 MILLIGRAM(S): 400 CAPSULE ORAL at 21:30

## 2022-10-31 RX ADMIN — HYDROMORPHONE HYDROCHLORIDE 0.5 MILLIGRAM(S): 2 INJECTION INTRAMUSCULAR; INTRAVENOUS; SUBCUTANEOUS at 14:14

## 2022-10-31 RX ADMIN — OXYCODONE HYDROCHLORIDE 10 MILLIGRAM(S): 5 TABLET ORAL at 12:45

## 2022-10-31 RX ADMIN — OXYCODONE HYDROCHLORIDE 10 MILLIGRAM(S): 5 TABLET ORAL at 21:38

## 2022-10-31 RX ADMIN — OXYCODONE HYDROCHLORIDE 10 MILLIGRAM(S): 5 TABLET ORAL at 18:12

## 2022-10-31 RX ADMIN — Medication 1 APPLICATION(S): at 07:50

## 2022-10-31 RX ADMIN — CHLORHEXIDINE GLUCONATE 1 APPLICATION(S): 213 SOLUTION TOPICAL at 13:06

## 2022-10-31 NOTE — CHART NOTE - NSCHARTNOTEFT_GEN_A_CORE
Tertiary Trauma Survey (TTS)    Date of TTS: 10-31-22 @ 13:57                             Admit Date: 10-30-22 @ 01:49      Trauma Activation: Transfer from [facility] [place]      Subjective / 24 hour events: Patient continues to have hyperesthesia's of bilateral upper extremities. Not tolerating PO fluid well due to mouth pain from the fall. Started on P-lyte at 50 for hydration. MAP goals remain >80 per NSX and has been maintaining without assitance. C-Collar in place at all times.     Vital Signs Last 24 Hrs  T(C): 36.8 (31 Oct 2022 03:35), Max: 37.1 (30 Oct 2022 14:39)  T(F): 98.2 (31 Oct 2022 03:35), Max: 98.7 (30 Oct 2022 14:39)  HR: 68 (31 Oct 2022 13:00) (55 - 89)  BP: 148/68 (31 Oct 2022 13:00) (89/51 - 148/68)  BP(mean): 90 (31 Oct 2022 13:00) (63 - 94)  RR: 16 (31 Oct 2022 13:00) (11 - 25)  SpO2: 96% (31 Oct 2022 13:00) (93% - 100%)    Parameters below as of 31 Oct 2022 12:00  Patient On (Oxygen Delivery Method): room air        Physical Exam:    Neuro: [ ] non focal neurological exam [X] Focal Neurological deficits noted to be: Burning/pain/numbness to bilateral 1-3 digits, hand and wrist.     HEENT: [X] Normo-cephalic/atraumatic  [ ] abnormalities noted to be:    Pulm/Chest:  [X] CTA b/l  [X] chest wall non tender  [ ] abnormalities noted to be:    Cardiac: [X] S1S2, sinus rhythm  [ ] abnormalities noted to be:     GI / Abdomen: [X] Soft, non-tender, non-distended [ ] abnormalities noted to be:    Musculoskeletal / Extremities: [ ]normal active ROM  [X] abnormalities noted to be: Decreased ROM to bilateral hands 2/2 pain.     Integumentary: [X] Skin intact [X] Warm [X] Dry [ ]abnormalities noted to be:    Vascular: [X] 2+ palpable distal pulses  [ ] BRIGITTE:  [ ] abnormalities noted to be:      List Injuries Identified to Date: Central Cord Syndrome    List Operative and Interventional Radiological Procedures:     Consults (Date):  [X] Neurosurgery   [  ] Orthopedics  [  ] Plastics  [  ] Urology  [  ] PM&R  [  ] Social Work    RADIOLOGICAL FINDINGS REVIEW:  CXR: Limited. No acute parenchymal disease.    Pelvis Films: Mild osteoarthritis of the bilateral hips. No fracture or dislocation is seen.    Extremity Films:  Left Hand XR: No fracture-subluxation demonstrated.  Right Knee XR: _____________________    Head/Maxillofacial CT: No evidence of acute intracranial or cervical spine injury.  No acute facial fracture.    C-Spine CT: No evidence of acute intracranial or cervical spine injury.  No acute facial fracture.    ABD/Pelvis CT: No evidence of acute traumatic injury to the chest, abdomen, or pelvis. Tertiary Trauma Survey (TTS)    Date of TTS: 10-31-22 @ 13:57                             Admit Date: 10-30-22 @ 01:49      Trauma Activation: [place] [facility] [place]      Subjective / 24 hour events: Patient continues to have hyperesthesia's of bilateral upper extremities. Not tolerating PO fluid well due to mouth pain from the fall. Started on P-lyte at 50 for hydration. MAP goals remain >80 per NSX and has been maintaining without assitance. C-Collar in place at all times.     Vital Signs Last 24 Hrs  T(C): 36.8 (31 Oct 2022 03:35), Max: 37.1 (30 Oct 2022 14:39)  T(F): 98.2 (31 Oct 2022 03:35), Max: 98.7 (30 Oct 2022 14:39)  HR: 68 (31 Oct 2022 13:00) (55 - 89)  BP: 148/68 (31 Oct 2022 13:00) (89/51 - 148/68)  BP(mean): 90 (31 Oct 2022 13:00) (63 - 94)  RR: 16 (31 Oct 2022 13:00) (11 - 25)  SpO2: 96% (31 Oct 2022 13:00) (93% - 100%)    Parameters below as of 31 Oct 2022 12:00  Patient On (Oxygen Delivery Method): room air        Physical Exam:    Neuro: [ ] non focal neurological exam [X] Focal Neurological deficits noted to be: Burning/pain/numbness to bilateral 1-3 digits, hand and wrist.     HEENT: [X] Normo-cephalic/atraumatic  [ ] abnormalities noted to be:    Pulm/Chest:  [X] CTA b/l  [X] chest wall non tender  [ ] abnormalities noted to be:    Cardiac: [X] S1S2, sinus rhythm  [ ] abnormalities noted to be:     GI / Abdomen: [X] Soft, non-tender, non-distended [ ] abnormalities noted to be:    Musculoskeletal / Extremities: [ ]normal active ROM  [X] abnormalities noted to be: Decreased ROM to bilateral hands 2/2 pain.     Integumentary: [X] Skin intact [X] Warm [X] Dry [ ]abnormalities noted to be:    Vascular: [X] 2+ palpable distal pulses  [ ] BRIGITTE:  [ ] abnormalities noted to be:      List Injuries Identified to Date: Central Cord Syndrome    List Operative and Interventional Radiological Procedures:     Consults (Date):  [X] Neurosurgery   [  ] Orthopedics  [  ] Plastics  [  ] Urology  [  ] PM&R  [  ] Social Work    RADIOLOGICAL FINDINGS REVIEW:  CXR: Limited. No acute parenchymal disease.    Pelvis Films: Mild osteoarthritis of the bilateral hips. No fracture or dislocation is seen.    Extremity Films:  Left Hand XR: No fracture-subluxation demonstrated.  Right Knee XR: No radiographic osseous pathology.    Head/Maxillofacial CT: No evidence of acute intracranial or cervical spine injury.  No acute facial fracture.    C-Spine CT: No evidence of acute intracranial or cervical spine injury.  No acute facial fracture.    ABD/Pelvis CT: No evidence of acute traumatic injury to the chest, abdomen, or pelvis.

## 2022-10-31 NOTE — PROGRESS NOTE ADULT - ASSESSMENT
ASSESSMENT: 62F no significant PMH presented as a transfer from [facility] [place] s/p mechanical fall +HS, -LOC, AC/AP use, presented with likely central cord phenomenon.     PLAN  - No acute neurosurgical intervention indicated at this time  - continue C collar at all times  - Maintain MAP > 80 x total of 3 days for spinal cord perfusion  - Neuro checks every 1 hr  - pain control as needed, avoid over sedation  - further medical care per primary team  - Discussed w/ Dr. Bullock

## 2022-10-31 NOTE — PROGRESS NOTE ADULT - SUBJECTIVE AND OBJECTIVE BOX
INTERVAL HPI/OVERNIGHT EVENTS/SUBJECTIVE:  Pt admitted yesterday, MRI report with cord compression but no acute injury  identified.  Gabapentin and IV Dilaudid with alleviation of pain.  Overnight pt slept well, awoke with headache.  Tylenol given.  Otherwise, no acute events.       ICU Vital Signs Last 24 Hrs  T(C): 36.8 (31 Oct 2022 03:35), Max: 37.1 (30 Oct 2022 14:39)  T(F): 98.2 (31 Oct 2022 03:35), Max: 98.7 (30 Oct 2022 14:39)  HR: 61 (31 Oct 2022 03:00) (55 - 69)  BP: 122/62 (31 Oct 2022 03:00) (89/73 - 151/70)  BP(mean): 81 (31 Oct 2022 03:00) (71 - 92)  ABP: --  ABP(mean): --  RR: 13 (31 Oct 2022 03:00) (11 - 19)  SpO2: 96% (31 Oct 2022 03:00) (94% - 100%)    O2 Parameters below as of 31 Oct 2022 00:00    O2 Flow (L/min): 2          I&O's Detail    30 Oct 2022 07:01  -  31 Oct 2022 05:45  --------------------------------------------------------  IN:    Oral Fluid: 230 mL    Sodium Chloride 0.9% Bolus: 500 mL  Total IN: 730 mL    OUT:    Voided (mL): 650 mL  Total OUT: 650 mL    Total NET: 80 mL                MEDICATIONS  (STANDING):  acetaminophen   IVPB .. 1000 milliGRAM(s) IV Intermittent once  chlorhexidine 2% Cloths 1 Application(s) Topical daily  enoxaparin Injectable 40 milliGRAM(s) SubCutaneous every 24 hours  gabapentin 300 milliGRAM(s) Oral three times a day  influenza   Vaccine 0.5 milliLiter(s) IntraMuscular once  methocarbamol 750 milliGRAM(s) Oral every 8 hours  senna 2 Tablet(s) Oral at bedtime    MEDICATIONS  (PRN):  acetaminophen     Tablet .. 975 milliGRAM(s) Oral every 8 hours PRN Mild Pain (1 - 3)  HYDROmorphone  Injectable 0.5 milliGRAM(s) IV Push every 3 hours PRN breakthrough pain  oxyCODONE    IR 5 milliGRAM(s) Oral every 4 hours PRN Moderate Pain (4 - 6)  oxyCODONE    IR 10 milliGRAM(s) Oral every 4 hours PRN Severe Pain (7 - 10)      NUTRITION/IVF: Regular/IVL    CENTRAL LINE:  No    STERLING:   No    A-LINE:    No         PHYSICAL EXAM:    Gen:  NAD    Eyes:    Neurological:    ENMT:    Neck:    Pulmonary:    Cardiovascular:    Gastrointestinal:    Genitourinary:    Back:    Extremities:    Skin:    Musculoskeletal:          LABS:  CBC Full  -  ( 31 Oct 2022 03:19 )  WBC Count : 7.45 K/uL  RBC Count : 3.91 M/uL  Hemoglobin : 12.2 g/dL  Hematocrit : 36.6 %  Platelet Count - Automated : 274 K/uL  Mean Cell Volume : 93.6 fl  Mean Cell Hemoglobin : 31.2 pg  Mean Cell Hemoglobin Concentration : 33.3 gm/dL  Auto Neutrophil # : 3.45 K/uL  Auto Lymphocyte # : 3.21 K/uL  Auto Monocyte # : 0.62 K/uL  Auto Eosinophil # : 0.12 K/uL  Auto Basophil # : 0.03 K/uL  Auto Neutrophil % : 46.3 %  Auto Lymphocyte % : 43.1 %  Auto Monocyte % : 8.3 %  Auto Eosinophil % : 1.6 %  Auto Basophil % : 0.4 %    10-31    138  |  105  |  9.2  ----------------------------<  92  6.0<H>   |  24.0  |  0.51    Ca    8.1<L>      31 Oct 2022 03:19    TPro  6.3<L>  /  Alb  3.8  /  TBili  0.3<L>  /  DBili  x   /  AST  22  /  ALT  23  /  AlkPhos  89  10-30    PT/INR - ( 30 Oct 2022 01:42 )   PT: 11.7 sec;   INR: 1.01 ratio         PTT - ( 30 Oct 2022 01:42 )  PTT:34.0 sec  Urinalysis Basic - ( 30 Oct 2022 19:40 )    Color: Yellow / Appearance: Clear / S.025 / pH: x  Gluc: x / Ketone: Negative  / Bili: Negative / Urobili: 1 mg/dL   Blood: x / Protein: Negative / Nitrite: Negative   Leuk Esterase: Negative / RBC: 0-2 /HPF / WBC 0-2 /HPF   Sq Epi: x / Non Sq Epi: x / Bacteria: Occasional      RECENT CULTURES:      LIVER FUNCTIONS - ( 30 Oct 2022 01:42 )  Alb: 3.8 g/dL / Pro: 6.3 g/dL / ALK PHOS: 89 U/L / ALT: 23 U/L / AST: 22 U/L / GGT: x               CAPILLARY BLOOD GLUCOSE      RADIOLOGY & ADDITIONAL STUDIES:    ASSESSMENT/PLAN:  62yFemale presenting with:    Neuro:    CV:    Pulm:    GI/Nutrition:    /Renal:    ID:    Lines/Tubes:    Endo:    Skin:    Proph:    Dispo:      CRITICAL CARE TIME SPENT:   INTERVAL HPI/OVERNIGHT EVENTS/SUBJECTIVE:  Pt admitted yesterday, MRI report with cord compression but no acute injury  identified.  Gabapentin and IV Dilaudid with alleviation of pain.  Overnight pt slept well, awoke with headache.  Tylenol given.  Otherwise, no acute events.       ICU Vital Signs Last 24 Hrs  T(C): 36.8 (31 Oct 2022 03:35), Max: 37.1 (30 Oct 2022 14:39)  T(F): 98.2 (31 Oct 2022 03:35), Max: 98.7 (30 Oct 2022 14:39)  HR: 61 (31 Oct 2022 03:00) (55 - 69)  BP: 122/62 (31 Oct 2022 03:00) (89/73 - 151/70)  BP(mean): 81 (31 Oct 2022 03:00) (71 - 92)  ABP: --  ABP(mean): --  RR: 13 (31 Oct 2022 03:00) (11 - 19)  SpO2: 96% (31 Oct 2022 03:00) (94% - 100%)    O2 Parameters below as of 31 Oct 2022 00:00    O2 Flow (L/min): 2          I&O's Detail    30 Oct 2022 07:01  -  31 Oct 2022 05:45  --------------------------------------------------------  IN:    Oral Fluid: 230 mL    Sodium Chloride 0.9% Bolus: 500 mL  Total IN: 730 mL    OUT:    Voided (mL): 650 mL  Total OUT: 650 mL    Total NET: 80 mL                MEDICATIONS  (STANDING):  acetaminophen   IVPB .. 1000 milliGRAM(s) IV Intermittent once  chlorhexidine 2% Cloths 1 Application(s) Topical daily  enoxaparin Injectable 40 milliGRAM(s) SubCutaneous every 24 hours  gabapentin 300 milliGRAM(s) Oral three times a day  influenza   Vaccine 0.5 milliLiter(s) IntraMuscular once  methocarbamol 750 milliGRAM(s) Oral every 8 hours  senna 2 Tablet(s) Oral at bedtime    MEDICATIONS  (PRN):  acetaminophen     Tablet .. 975 milliGRAM(s) Oral every 8 hours PRN Mild Pain (1 - 3)  HYDROmorphone  Injectable 0.5 milliGRAM(s) IV Push every 3 hours PRN breakthrough pain  oxyCODONE    IR 5 milliGRAM(s) Oral every 4 hours PRN Moderate Pain (4 - 6)  oxyCODONE    IR 10 milliGRAM(s) Oral every 4 hours PRN Severe Pain (7 - 10)      NUTRITION/IVF: Regular/IVL    CENTRAL LINE:  No    STERLING:   No    A-LINE:    No         PHYSICAL EXAM:    Gen:  NAD    Eyes:  EOMI's BL    Neurological:  GCS 15, weakness noted BL distal upper extremities, thumb opposition weak BL    ENMT: MMM    Neck:  In Aspen collar.  Trachea midline    Pulmonary:  RA, unlabored CTAB    Cardiovascular:  NSR    Gastrointestinal:  Soft NTND    Genitourinary:  Voids    Extremities:  As noted above in Neuro.  Normal ROM BL LE with normal strength. Normal prox muscle strength BL UE    Skin:  Scattered facial abrasions, healing    Musculoskeletal:  No pedal edema BL          LABS:  CBC Full  -  ( 31 Oct 2022 03:19 )  WBC Count : 7.45 K/uL  RBC Count : 3.91 M/uL  Hemoglobin : 12.2 g/dL  Hematocrit : 36.6 %  Platelet Count - Automated : 274 K/uL  Mean Cell Volume : 93.6 fl  Mean Cell Hemoglobin : 31.2 pg  Mean Cell Hemoglobin Concentration : 33.3 gm/dL  Auto Neutrophil # : 3.45 K/uL  Auto Lymphocyte # : 3.21 K/uL  Auto Monocyte # : 0.62 K/uL  Auto Eosinophil # : 0.12 K/uL  Auto Basophil # : 0.03 K/uL  Auto Neutrophil % : 46.3 %  Auto Lymphocyte % : 43.1 %  Auto Monocyte % : 8.3 %  Auto Eosinophil % : 1.6 %  Auto Basophil % : 0.4 %    10-31    138  |  105  |  9.2  ----------------------------<  92  6.0<H>   |  24.0  |  0.51    Ca    8.1<L>      31 Oct 2022 03:19    TPro  6.3<L>  /  Alb  3.8  /  TBili  0.3<L>  /  DBili  x   /  AST  22  /  ALT  23  /  AlkPhos  89  10-30    PT/INR - ( 30 Oct 2022 01:42 )   PT: 11.7 sec;   INR: 1.01 ratio         PTT - ( 30 Oct 2022 01:42 )  PTT:34.0 sec  Urinalysis Basic - ( 30 Oct 2022 19:40 )    Color: Yellow / Appearance: Clear / S.025 / pH: x  Gluc: x / Ketone: Negative  / Bili: Negative / Urobili: 1 mg/dL   Blood: x / Protein: Negative / Nitrite: Negative   Leuk Esterase: Negative / RBC: 0-2 /HPF / WBC 0-2 /HPF   Sq Epi: x / Non Sq Epi: x / Bacteria: Occasional      RECENT CULTURES:      LIVER FUNCTIONS - ( 30 Oct 2022 01:42 )  Alb: 3.8 g/dL / Pro: 6.3 g/dL / ALK PHOS: 89 U/L / ALT: 23 U/L / AST: 22 U/L / GGT: x               CAPILLARY BLOOD GLUCOSE      RADIOLOGY & ADDITIONAL STUDIES:    ASSESSMENT/PLAN:  62yFemale presenting with:  s/p fall with acute traumatic central cord syndrome    Neuro:  Pt in c collar at all times, Q1hr Neurochecks.  No definitive plan per Neurosx, will follow up today.  Gabapentin alleviating dysethesias, PO and IV pain meds    CV:  Hemodynamically normal, target MAP for spinal perfusion 80.  Pt has been able to auto maintain.  MAP slightly decreases after Dilaudid, but recovers on it's own    Pulm:  RA, encourage IS, HOB elevation, and OOBTC once cleared by Neurosx    GI/Nutrition:  Regular diet, IVL    /Renal:  Voids    ID:  None    Lines/Tubes:  None    Endo:  No issues    Skin:  Bacitracin to facial abrasions    Proph:  SCDs, Lovenox    Dispo:  SICU until MAP goal decreased, likely deescalate Neurochecks today, follow up plan per Neurosx      CRITICAL CARE TIME SPENT:

## 2022-10-31 NOTE — PROGRESS NOTE ADULT - SUBJECTIVE AND OBJECTIVE BOX
SUBJECTIVE: Patient seen and examined at bedside. She is complaining of persistent bilateral upper extremity hand numbness and burning with paresthesias to touch and hypersensitivity. Denies paresthesias, numbness, tingling of the lower extremities, urinary and/or fecal incontinence or retention.     Vital Signs Last 24 Hrs  T(C): 36.8 (10-31-22 @ 03:35), Max: 37.1 (10-30-22 @ 14:39)  T(F): 98.2 (10-31-22 @ 03:35), Max: 98.7 (10-30-22 @ 14:39)  HR: 68 (10-31-22 @ 13:00) (55 - 89)  BP: 148/68 (10-31-22 @ 13:00) (89/51 - 148/68)  BP(mean): 90 (10-31-22 @ 13:00) (63 - 94)  RR: 16 (10-31-22 @ 13:00) (11 - 25)  SpO2: 96% (10-31-22 @ 13:00) (93% - 100%)    LABS:                                   12.2   7.45  )-----------( 274      ( 31 Oct 2022 03:19 )             36.6   10-31    138  |  105  |  9.2  ----------------------------<  92  6.0<H>   |  24.0  |  0.51    Ca    8.1<L>      31 Oct 2022 03:19    TPro  6.3<L>  /  Alb  3.8  /  TBili  0.3<L>  /  DBili  x   /  AST  22  /  ALT  23  /  AlkPhos  89  10-30      RADIOLOGY & ADDITIONAL STUDIES:    MR Cervical Spine No Cont (10.30.22 @ 03:56)  IMPRESSION:  Cord compression at C3/C4 and C4/C5 levels without cord signal   abnormality on T2 sagittal.  Likely Modic endplate degenerative signal   changes at C4/C5 level. If there is clinical concern for early   discitis/osteomyelitis consider follow-up postcontrast images.    This case was discussed with Dr. Ramirez at 5:25 a.m. central standard   time on 10/30/2022 by Dr. TOREY MIMS.    Preliminary report provided by [facility] TOREY MIMS.    --- End of Report ---    CHARO MCALLISTER MD; Attending Radiologist  This document has been electronically signed. Oct 30 2022  9:08AM          PHYSICAL EXAM:  GENERAL: NAD, well-groomed, c-collar on  HEAD:  facial lacs, normocephalic  MENTAL STATUS: AAO x3; Awake. Opens eyes spontaneously. Appropriately conversant without aphasia, following simple commands.  CRANIAL NERVES: Visual acuity normal for age, visual fields full to confrontation, PERRL. EOMI without nystagmus. Facial sensation intact V1-3 distribution b/l. Face symmetric w/ normal eye closure and smile, tongue midline. Hearing grossly intact. Speech clear.   MOTOR: strength 5/5 b/l upper and lower extremities aside from b/l hand  which is 3/5 secondary to painful paresthesias  SENSATION: grossly intact to light touch all extremities, increased sensation b/l hands among the lateral aspect

## 2022-10-31 NOTE — PHARMACOTHERAPY INTERVENTION NOTE - COMMENTS
spoke with patient at bedside regarding home medication list. Patient reports she does not take any medications at home

## 2022-11-01 LAB
ANION GAP SERPL CALC-SCNC: 8 MMOL/L — SIGNIFICANT CHANGE UP (ref 5–17)
BUN SERPL-MCNC: 7.9 MG/DL — LOW (ref 8–20)
CALCIUM SERPL-MCNC: 8.2 MG/DL — LOW (ref 8.4–10.5)
CHLORIDE SERPL-SCNC: 102 MMOL/L — SIGNIFICANT CHANGE UP (ref 96–108)
CO2 SERPL-SCNC: 27 MMOL/L — SIGNIFICANT CHANGE UP (ref 22–29)
CREAT SERPL-MCNC: 0.62 MG/DL — SIGNIFICANT CHANGE UP (ref 0.5–1.3)
EGFR: 99 ML/MIN/1.73M2 — SIGNIFICANT CHANGE UP
GLUCOSE SERPL-MCNC: 102 MG/DL — HIGH (ref 70–99)
MAGNESIUM SERPL-MCNC: 1.8 MG/DL — SIGNIFICANT CHANGE UP (ref 1.6–2.6)
PHOSPHATE SERPL-MCNC: 2.7 MG/DL — SIGNIFICANT CHANGE UP (ref 2.4–4.7)
POTASSIUM SERPL-MCNC: 4.2 MMOL/L — SIGNIFICANT CHANGE UP (ref 3.5–5.3)
POTASSIUM SERPL-SCNC: 4.2 MMOL/L — SIGNIFICANT CHANGE UP (ref 3.5–5.3)
SODIUM SERPL-SCNC: 137 MMOL/L — SIGNIFICANT CHANGE UP (ref 135–145)

## 2022-11-01 PROCEDURE — 99291 CRITICAL CARE FIRST HOUR: CPT

## 2022-11-01 PROCEDURE — 99232 SBSQ HOSP IP/OBS MODERATE 35: CPT

## 2022-11-01 RX ORDER — LAMOTRIGINE 25 MG/1
25 TABLET, ORALLY DISINTEGRATING ORAL EVERY 12 HOURS
Refills: 0 | Status: COMPLETED | OUTPATIENT
Start: 2022-11-01 | End: 2022-11-04

## 2022-11-01 RX ORDER — MAGNESIUM SULFATE 500 MG/ML
2 VIAL (ML) INJECTION ONCE
Refills: 0 | Status: COMPLETED | OUTPATIENT
Start: 2022-11-01 | End: 2022-11-01

## 2022-11-01 RX ORDER — SODIUM CHLORIDE 9 MG/ML
500 INJECTION, SOLUTION INTRAVENOUS ONCE
Refills: 0 | Status: COMPLETED | OUTPATIENT
Start: 2022-11-01 | End: 2022-11-01

## 2022-11-01 RX ORDER — POLYETHYLENE GLYCOL 3350 17 G/17G
17 POWDER, FOR SOLUTION ORAL EVERY 12 HOURS
Refills: 0 | Status: DISCONTINUED | OUTPATIENT
Start: 2022-11-01 | End: 2022-11-05

## 2022-11-01 RX ADMIN — METHOCARBAMOL 750 MILLIGRAM(S): 500 TABLET, FILM COATED ORAL at 14:06

## 2022-11-01 RX ADMIN — HYDROMORPHONE HYDROCHLORIDE 0.5 MILLIGRAM(S): 2 INJECTION INTRAMUSCULAR; INTRAVENOUS; SUBCUTANEOUS at 12:19

## 2022-11-01 RX ADMIN — ENOXAPARIN SODIUM 40 MILLIGRAM(S): 100 INJECTION SUBCUTANEOUS at 21:49

## 2022-11-01 RX ADMIN — OXYCODONE HYDROCHLORIDE 10 MILLIGRAM(S): 5 TABLET ORAL at 21:50

## 2022-11-01 RX ADMIN — GABAPENTIN 600 MILLIGRAM(S): 400 CAPSULE ORAL at 14:07

## 2022-11-01 RX ADMIN — SODIUM CHLORIDE 50 MILLILITER(S): 9 INJECTION, SOLUTION INTRAVENOUS at 05:00

## 2022-11-01 RX ADMIN — SENNA PLUS 2 TABLET(S): 8.6 TABLET ORAL at 21:50

## 2022-11-01 RX ADMIN — HYDROMORPHONE HYDROCHLORIDE 0.5 MILLIGRAM(S): 2 INJECTION INTRAMUSCULAR; INTRAVENOUS; SUBCUTANEOUS at 11:49

## 2022-11-01 RX ADMIN — LAMOTRIGINE 25 MILLIGRAM(S): 25 TABLET, ORALLY DISINTEGRATING ORAL at 05:36

## 2022-11-01 RX ADMIN — Medication 975 MILLIGRAM(S): at 10:36

## 2022-11-01 RX ADMIN — METHOCARBAMOL 750 MILLIGRAM(S): 500 TABLET, FILM COATED ORAL at 05:51

## 2022-11-01 RX ADMIN — Medication 975 MILLIGRAM(S): at 11:01

## 2022-11-01 RX ADMIN — Medication 25 GRAM(S): at 05:38

## 2022-11-01 RX ADMIN — HYDROMORPHONE HYDROCHLORIDE 0.5 MILLIGRAM(S): 2 INJECTION INTRAMUSCULAR; INTRAVENOUS; SUBCUTANEOUS at 17:25

## 2022-11-01 RX ADMIN — GABAPENTIN 600 MILLIGRAM(S): 400 CAPSULE ORAL at 21:50

## 2022-11-01 RX ADMIN — CHLORHEXIDINE GLUCONATE 1 APPLICATION(S): 213 SOLUTION TOPICAL at 11:03

## 2022-11-01 RX ADMIN — LAMOTRIGINE 25 MILLIGRAM(S): 25 TABLET, ORALLY DISINTEGRATING ORAL at 17:25

## 2022-11-01 RX ADMIN — GABAPENTIN 600 MILLIGRAM(S): 400 CAPSULE ORAL at 05:36

## 2022-11-01 RX ADMIN — OXYCODONE HYDROCHLORIDE 10 MILLIGRAM(S): 5 TABLET ORAL at 15:48

## 2022-11-01 RX ADMIN — HYDROMORPHONE HYDROCHLORIDE 0.5 MILLIGRAM(S): 2 INJECTION INTRAMUSCULAR; INTRAVENOUS; SUBCUTANEOUS at 18:45

## 2022-11-01 RX ADMIN — OXYCODONE HYDROCHLORIDE 10 MILLIGRAM(S): 5 TABLET ORAL at 20:24

## 2022-11-01 RX ADMIN — METHOCARBAMOL 750 MILLIGRAM(S): 500 TABLET, FILM COATED ORAL at 21:50

## 2022-11-01 RX ADMIN — OXYCODONE HYDROCHLORIDE 10 MILLIGRAM(S): 5 TABLET ORAL at 17:18

## 2022-11-01 RX ADMIN — SODIUM CHLORIDE 500 MILLILITER(S): 9 INJECTION, SOLUTION INTRAVENOUS at 05:35

## 2022-11-01 RX ADMIN — OXYCODONE HYDROCHLORIDE 10 MILLIGRAM(S): 5 TABLET ORAL at 08:34

## 2022-11-01 RX ADMIN — OXYCODONE HYDROCHLORIDE 10 MILLIGRAM(S): 5 TABLET ORAL at 08:04

## 2022-11-01 RX ADMIN — Medication 1 APPLICATION(S): at 17:32

## 2022-11-01 RX ADMIN — Medication 1 APPLICATION(S): at 05:51

## 2022-11-01 NOTE — PROGRESS NOTE ADULT - ASSESSMENT
ASSESSMENT  62F no significant PMH presented as a [place] [facility] [place] mechanical [place] +HS, -LOC, AC/AP use, presented with likely central cord phenomenon.     PLAN  - case d/w team  - no acute neurosurgical intervention indicated at this time  - continue neuro checks  - pain control as needed, continue Gabapentin 600 tid  - C-collar at all times  - Continue to maintain MAP >80 x1 more day for spinal perfusion  - further medical care per primary team  - will continue to follow

## 2022-11-01 NOTE — DIETITIAN INITIAL EVALUATION ADULT - PERTINENT LABORATORY DATA
11-01    137  |  102  |  7.9<L>  ----------------------------<  102<H>  4.2   |  27.0  |  0.62    Ca    8.2<L>      01 Nov 2022 03:45  Phos  2.7     11-01  Mg     1.8     11-01

## 2022-11-01 NOTE — PHYSICAL THERAPY INITIAL EVALUATION ADULT - GENERAL OBSERVATIONS, REHAB EVAL
Pt received supine in bed + telemetry//BP + IV + Venous Compression Boots, c/o 8/10 bilateral hand pain, pt agreeable to PT

## 2022-11-01 NOTE — OCCUPATIONAL THERAPY INITIAL EVALUATION ADULT - PERTINENT HX OF CURRENT PROBLEM, REHAB EVAL
As per MD note: Ms. Scott is a 61 yo F who presented to ED s/p mechanical GLF while in her driveway. Patient hit her face on the ground, landing face forward on her arm. Patient denies any LOC. + Headstrike. Not on ASA/AC. She [place] [facility] [place] complaints of bilateral numbness/tingling in hands, as well as RLE numbness/weakness (she does endorse previous  radiculopathy s/p melanoma excision). CT head/max/face/ A/P negative for any acute traumatic injuries. CT C-spine demonstrates C4/C5 spinal stenosis. Patient was transferred to [facility] for MRI, and further evaluation. On presentation, patient primary intact, GCS 15. HDS. She is noted to have a small mucosal lip laceration s/p repair by EDm sensory defect in bilateral hands. Otherwise, intact. MRI completed, pending final read. NSGY consulted for evaluation.

## 2022-11-01 NOTE — DIETITIAN INITIAL EVALUATION ADULT - NSICDXPASTSURGICALHX_GEN_ALL_CORE_FT
PAST SURGICAL HISTORY:  H/O right breast biopsy     History of appendectomy     History of cholecystectomy     History of tonsillectomy

## 2022-11-01 NOTE — PROGRESS NOTE ADULT - SUBJECTIVE AND OBJECTIVE BOX
HPI:  Trauma Surgery     Consulting attending: Dr. Chakraborty       HPI: Ms. Scott is a 63 yo F who presented to ED s/p mechanical GLF while in her driveway. Patient hit her face on the ground, landing face forward on her arm. Patient denies any LOC. + Headstrike. Not on ASA/AC. She presented to [facility] [place] with complaints of bilateral numbness/tingling in hands, as well as RLE numbness/weakness (she does endorse previous  radiculopathy s/p melanoma excision). CT head/max/face/ A/P negative for any acute traumatic injuries. CT C-spine demonstrates C4/C5 spinal stenosis. Patient was transferred to [facility] for MRI, and further evaluation. On presentation, patient primary intact, GCS 15. HDS. She is noted to have a small mucosal lip laceration s/p repair by EDm sensory defect in bilateral hands. Otherwise, intact. MRI completed, pending final read. NSGY consulted for evaluation.    (30 Oct 2022 06:07)      INTERVAL HPI/OVERNIGHT EVENTS:  62y Female seen lying comfortably in bed. States her pain is slightly improved today. No acute over night events.    Vital Signs Last 24 Hrs  T(C): 37.3 (2022 07:34), Max: 37.5 (31 Oct 2022 20:01)  T(F): 99.2 (2022 07:34), Max: 99.5 (31 Oct 2022 20:01)  HR: 75 (2022 09:00) (60 - 83)  BP: 124/77 (2022 09:00) (89/51 - 148/68)  BP(mean): 81 (2022 09:00) (63 - 104)  RR: 14 (2022 09:00) (12 - 23)  SpO2: 95% (2022 09:00) (93% - 100%)    Parameters below as of 2022 08:00  Patient On (Oxygen Delivery Method): nasal cannula  O2 Flow (L/min): 2    PHYSICAL EXAM:  GENERAL: NAD, well-groomed, C-collar on  HEAD:  Atraumatic, normocephalic  MENTAL STATUS: AAO x3; Awake. Opens eyes spontaneously. Appropriately conversant without aphasia, following simple commands.  CRANIAL NERVES: Visual acuity normal for age, visual fields full to confrontation, PERRL. EOMI without nystagmus. Facial sensation intact V1-3 distribution b/l. Face symmetric w/ normal eye closure and smile, tongue midline. Hearing grossly intact. Speech clear.   MOTOR: strength 5/5 b/l upper and lower extremities, HG limited secondary to pain  SENSATION: hypersensitivity b/l hands    LABS:                        12.2   7.45  )-----------( 274      ( 31 Oct 2022 03:19 )             36.6         137  |  102  |  7.9<L>  ----------------------------<  102<H>  4.2   |  27.0  |  0.62    Ca    8.2<L>      2022 03:45  Phos  2.7       Mg     1.8             Urinalysis Basic - ( 30 Oct 2022 19:40 )    Color: Yellow / Appearance: Clear / S.025 / pH: x  Gluc: x / Ketone: Negative  / Bili: Negative / Urobili: 1 mg/dL   Blood: x / Protein: Negative / Nitrite: Negative   Leuk Esterase: Negative / RBC: 0-2 /HPF / WBC 0-2 /HPF   Sq Epi: x / Non Sq Epi: x / Bacteria: Occasional        10-31 @ :  -   @ 07:00  --------------------------------------------------------  IN: 1700 mL / OUT: 750 mL / NET: 950 mL     @ 07:  -   @ 09:55  --------------------------------------------------------  IN: 150 mL / OUT: 800 mL / NET: -650 mL        RADIOLOGY & ADDITIONAL TESTS:  < from: MR Cervical Spine No Cont (10.30.22 @ 03:56) >  IMPRESSION:  Cord compression at C3/C4 and C4/C5 levels without cord signal   abnormality on T2 sagittal.  Likely Modic endplate degenerative signal   changes at C4/C5 level. If there is clinical concern for early   discitis/osteomyelitis consider follow-up postcontrast images.      < end of copied text >          CAPRINI SCORE [CLOT]:  Patient has an estimated Caprini score of greater than 5.  However, the patient's unique clinical situation will be addressed in an individual manner to determine appropriate anticoagulation treatment, if any.

## 2022-11-01 NOTE — PHYSICAL THERAPY INITIAL EVALUATION ADULT - SENSORY TESTS
(+) eye tracking in Bilateral eyes to central/lateral/inferior fields, difficulty following to bilateral lateral/superior fields. Aquity impaired in all peripheral fields, intact in central field

## 2022-11-01 NOTE — PHYSICAL THERAPY INITIAL EVALUATION ADULT - ADDITIONAL COMMENTS
Pt lives in a private home with her spouse, 8 steps to enter with handrails, 18 steps inside with handrails to bedroom. Pt was independent PTA without an assist device. Pt does not own DME.

## 2022-11-01 NOTE — PHYSICAL THERAPY INITIAL EVALUATION ADULT - PERTINENT HX OF CURRENT PROBLEM, REHAB EVAL
61 yo F who presented to ED s/p mechanical GLF while in her driveway. Patient hit her face on the ground, landing face forward on her arm. Patient denies any LOC. + Headstrike. Not on ASA/AC. She presented to [facility] [place] with complaints of bilateral numbness/tingling in hands, as well as RLE numbness/weakness (she does endorse previous  radiculopathy s/p melanoma excision). CT head/max/face/ A/P negative for any acute traumatic injuries. CT C-spine demonstrates C4/C5 spinal stenosis. Patient was transferred to [facility] for MRI, and further evaluation. On presentation, patient primary intact, GCS 15. HDS. She is noted to have a small mucosal lip laceration s/p repair by EDm sensory defect in bilateral hands. Otherwise, intact. MRI completed, pending final read. NSGY consulted for evaluation.

## 2022-11-01 NOTE — DIETITIAN INITIAL EVALUATION ADULT - PERTINENT MEDS FT
MEDICATIONS  (STANDING):  BACItracin   Ointment 1 Application(s) Topical two times a day  chlorhexidine 2% Cloths 1 Application(s) Topical daily  enoxaparin Injectable 40 milliGRAM(s) SubCutaneous every 24 hours  gabapentin 600 milliGRAM(s) Oral three times a day  influenza   Vaccine 0.5 milliLiter(s) IntraMuscular once  lamoTRIgine 25 milliGRAM(s) Oral every 12 hours  methocarbamol 750 milliGRAM(s) Oral every 8 hours  senna 2 Tablet(s) Oral at bedtime    MEDICATIONS  (PRN):  acetaminophen     Tablet .. 975 milliGRAM(s) Oral every 8 hours PRN Mild Pain (1 - 3)  HYDROmorphone  Injectable 0.5 milliGRAM(s) IV Push every 4 hours PRN breakthrough pain  oxyCODONE    IR 5 milliGRAM(s) Oral every 4 hours PRN Moderate Pain (4 - 6)  oxyCODONE    IR 10 milliGRAM(s) Oral every 4 hours PRN Severe Pain (7 - 10)  polyethylene glycol 3350 17 Gram(s) Oral every 12 hours PRN Constipation

## 2022-11-01 NOTE — DIETITIAN INITIAL EVALUATION ADULT - OTHER INFO
Ms. Scott is a 63 yo F who presented to ED s/p mechanical GLF while in her driveway. [place] [facility] [place] of [place]ness/tingling in hands, as well as RLE numbness/weakness (she does endorse previous  radiculopathy s/p melanoma excision). CT head/max/face/ A/P negative for any acute traumatic injuries. CT C-spine demonstrates C4/C5 spinal stenosis. On presentation, patient primary intact, GCS 15. HDS. She is noted to have a small mucosal lip laceration s/p repair by EDm sensory defect in bilateral hands. Otherwise, intact. MRI completed, pending final read. NSGY consulted for evaluation.

## 2022-11-01 NOTE — PROGRESS NOTE ADULT - SUBJECTIVE AND OBJECTIVE BOX
INTERVAL HPI/OVERNIGHT EVENTS:  Ms. Scott is a 61 yo F who presented to ED s/p mechanical GLF while in her driveway. Patient hit her face on the ground, landing face forward on her arm. Patient denies any LOC. + Headstrike. Not on ASA/AC. She presented to [facility] [place] with complaints of bilateral numbness/tingling in hands, as well as RLE numbness/weakness (she does endorse previous  radiculopathy s/p melanoma excision). CT head/max/face/ A/P negative for any acute traumatic injuries. CT C-spine demonstrates C4/C5 spinal stenosis. Patient was transferred to [facility] for MRI, and further evaluation.  MRI on 10/30 showed cord compression at C3/C4 and C4/C5 without cord signal abnormality, consistent with central cord phenomenon.   Pt admitted to SICU for q1 neuro checks and MAP goals>80 to improve spinal cord perfusion    SUBJECTIVE: Patient seen and examined at bedside. Pt endorses persistent paresthesias/burning, and hypersensitivity in bilateral upper extremities but denies any lower extremity weakness, or urinary retention/incontinence or any paresthesias of lower extremities.     Pt endorses improvement in her symptoms with current pain regimen    ROS: All negative except as listed above.    VITAL SIGNS:  ICU Vital Signs Last 24 Hrs  T(C): 37.2 (31 Oct 2022 23:38), Max: 37.5 (31 Oct 2022 20:01)  T(F): 99 (31 Oct 2022 23:38), Max: 99.5 (31 Oct 2022 20:01)  HR: 82 (31 Oct 2022 23:00) (60 - 89)  BP: 117/66 (31 Oct 2022 23:00) (89/51 - 148/68)  BP(mean): 81 (31 Oct 2022 23:00) (63 - 104)  ABP: --  ABP(mean): --  RR: 15 (31 Oct 2022 23:00) (12 - 25)  SpO2: 97% (31 Oct 2022 23:00) (93% - 100%)    O2 Parameters below as of 31 Oct 2022 20:00  Patient On (Oxygen Delivery Method): nasal cannula  O2 Flow (L/min): 3     10- @ 07:01  -  10-31 @ 07:00  --------------------------------------------------------  IN: 880 mL / OUT: 650 mL / NET: 230 mL    10-31 @ 07: @ 00:03  --------------------------------------------------------  IN: 750 mL / OUT: 750 mL / NET: 0 mL    ECG: reviewed.    PHYSICAL EXAM:    GENERAL: NAD, c-collar in place  HEAD: normocephalic   Resp: equal chest rise, unlabored breaths, CTABL  Cardiac: S1/S2 without m, r, g   Neuro: AAO x3 and answers questions appropriately but intermittently delirious, perseverating over a party she is planning ; opens eyes spontaneously   tongue midline, clear speech  Eyes: PERRL. EOMI without nystagmus.    MOTOR: strength 5/5 proximal b/l upper extremities and lower extremities bilat  hand grip3/5 2/2 pain  sensation grossly intact to touch all extremities, increased sensation b/l hands among the lateral aspect    MEDICATIONS:  MEDICATIONS  (STANDING):  BACItracin   Ointment 1 Application(s) Topical two times a day  chlorhexidine 2% Cloths 1 Application(s) Topical daily  enoxaparin Injectable 40 milliGRAM(s) SubCutaneous every 24 hours  gabapentin 600 milliGRAM(s) Oral three times a day  influenza   Vaccine 0.5 milliLiter(s) IntraMuscular once  lamoTRIgine 25 milliGRAM(s) Oral every 12 hours  methocarbamol 750 milliGRAM(s) Oral every 8 hours  multiple electrolytes Injection Type 1 1000 milliLiter(s) (50 mL/Hr) IV Continuous <Continuous>  senna 2 Tablet(s) Oral at bedtime    MEDICATIONS  (PRN):  acetaminophen     Tablet .. 975 milliGRAM(s) Oral every 8 hours PRN Mild Pain (1 - 3)  HYDROmorphone  Injectable 0.5 milliGRAM(s) IV Push every 4 hours PRN breakthrough pain  oxyCODONE    IR 5 milliGRAM(s) Oral every 4 hours PRN Moderate Pain (4 - 6)  oxyCODONE    IR 10 milliGRAM(s) Oral every 4 hours PRN Severe Pain (7 - 10)      ALLERGIES:  Allergies    CILANTRO (Anaphylaxis (Severe))  Demerol (Flushing; Other (Severe))    Intolerances        LABS:                        12.2   7.45  )-----------( 274      ( 31 Oct 2022 03:19 )             36.6     10-    138  |  105  |  9.2  ----------------------------<  92  6.0<H>   |  24.0  |  0.51    Ca    8.1<L>      31 Oct 2022 03:19    TPro  6.3<L>  /  Alb  3.8  /  TBili  0.3<L>  /  DBili  x   /  AST  22  /  ALT  23  /  AlkPhos  89  10-30    PT/INR - ( 30 Oct 2022 01:42 )   PT: 11.7 sec;   INR: 1.01 ratio         PTT - ( 30 Oct 2022 01:42 )  PTT:34.0 sec  Urinalysis Basic - ( 30 Oct 2022 19:40 )    Color: Yellow / Appearance: Clear / S.025 / pH: x  Gluc: x / Ketone: Negative  / Bili: Negative / Urobili: 1 mg/dL   Blood: x / Protein: Negative / Nitrite: Negative   Leuk Esterase: Negative / RBC: 0-2 /HPF / WBC 0-2 /HPF   Sq Epi: x / Non Sq Epi: x / Bacteria: Occasional        RADIOLOGY & ADDITIONAL TESTS: Reviewed.

## 2022-11-01 NOTE — DIETITIAN INITIAL EVALUATION ADULT - ORAL INTAKE PTA/DIET HISTORY
Interviewed pt at bedside. Pt reports poor appetite and PO intake since admission taking a few bites of meals. Pt reports some difficulty chewing/swallowing due to cuts in mouth, consider SLP consult. UBW of 190 and no recent wt changes. Encouraged pt to consume HBV protein foods to promote healing, pt receptive. Pt at risk for malnutrition due to decreased appetite and limited intake, RD to follow.

## 2022-11-01 NOTE — OCCUPATIONAL THERAPY INITIAL EVALUATION ADULT - RANGE OF MOTION EXAMINATION, UPPER EXTREMITY
- except to IP jonts of bilat hands, decreased flexion throughout all hand joints including thumb IP/bilateral UE Active ROM was WNL (within normal limits)

## 2022-11-01 NOTE — OCCUPATIONAL THERAPY INITIAL EVALUATION ADULT - MANUAL MUSCLE TESTING RESULTS, REHAB EVAL
cervical precautions/injury, weakness noted at bilat intrinsic musculature of hands./not tested due to

## 2022-11-01 NOTE — PROGRESS NOTE ADULT - ASSESSMENT
62yFemale s/p fall with acute traumatic central cord syndrome    Neuro:   - A/Ox3 with intermittent delirium limit narcotics for pain control and frequent reorientation  - q1 neuro checks   - c collar at all times  - lamictal added to current regimen to improve hypersensitivity/paresthesias   -neurosurg following    CV:    - HD stable without pressor requirements  -  target MAP for spinal perfusion 80x3 days.  Pt has been able to auto maintain.  MAP slightly decreases after Dilaudid, but recovers on it's own    Pulm:   -  satting adequately on RA   - ISS   - oob when cleared by neurosurg prospective     GI/Nutrition:    - abd soft  - reg diet; PO intake limited by hand --requires assistance  - bowel regimen     /Renal:  - IVF @50cc/hr until good PO intake   - voiding freely on own   - replete lytes as needed     ID:    - afebrile without leukocytosis  - cont to monitor off abx     Heme:  - cbc stable   -dvt ppx: SCDS + lovenox     Endo:    - maintaining euglycemia without insulin requirements  - cont to follow gluc on bmp    Skin:    - Bacitracin to facial abrasions      Dispo:  SICU for q1 neurochecks and MAP goals

## 2022-11-02 LAB
ANION GAP SERPL CALC-SCNC: 8 MMOL/L — SIGNIFICANT CHANGE UP (ref 5–17)
BASOPHILS # BLD AUTO: 0.02 K/UL — SIGNIFICANT CHANGE UP (ref 0–0.2)
BASOPHILS NFR BLD AUTO: 0.3 % — SIGNIFICANT CHANGE UP (ref 0–2)
BUN SERPL-MCNC: 10.6 MG/DL — SIGNIFICANT CHANGE UP (ref 8–20)
CALCIUM SERPL-MCNC: 8.6 MG/DL — SIGNIFICANT CHANGE UP (ref 8.4–10.5)
CHLORIDE SERPL-SCNC: 102 MMOL/L — SIGNIFICANT CHANGE UP (ref 96–108)
CO2 SERPL-SCNC: 29 MMOL/L — SIGNIFICANT CHANGE UP (ref 22–29)
CREAT SERPL-MCNC: 0.65 MG/DL — SIGNIFICANT CHANGE UP (ref 0.5–1.3)
EGFR: 98 ML/MIN/1.73M2 — SIGNIFICANT CHANGE UP
EOSINOPHIL # BLD AUTO: 0.17 K/UL — SIGNIFICANT CHANGE UP (ref 0–0.5)
EOSINOPHIL NFR BLD AUTO: 2.9 % — SIGNIFICANT CHANGE UP (ref 0–6)
GLUCOSE SERPL-MCNC: 108 MG/DL — HIGH (ref 70–99)
HCT VFR BLD CALC: 38.8 % — SIGNIFICANT CHANGE UP (ref 34.5–45)
HGB BLD-MCNC: 12.7 G/DL — SIGNIFICANT CHANGE UP (ref 11.5–15.5)
IMM GRANULOCYTES NFR BLD AUTO: 0.3 % — SIGNIFICANT CHANGE UP (ref 0–0.9)
LYMPHOCYTES # BLD AUTO: 2.52 K/UL — SIGNIFICANT CHANGE UP (ref 1–3.3)
LYMPHOCYTES # BLD AUTO: 43.4 % — SIGNIFICANT CHANGE UP (ref 13–44)
MAGNESIUM SERPL-MCNC: 1.9 MG/DL — SIGNIFICANT CHANGE UP (ref 1.6–2.6)
MCHC RBC-ENTMCNC: 30.8 PG — SIGNIFICANT CHANGE UP (ref 27–34)
MCHC RBC-ENTMCNC: 32.7 GM/DL — SIGNIFICANT CHANGE UP (ref 32–36)
MCV RBC AUTO: 94.2 FL — SIGNIFICANT CHANGE UP (ref 80–100)
MONOCYTES # BLD AUTO: 0.53 K/UL — SIGNIFICANT CHANGE UP (ref 0–0.9)
MONOCYTES NFR BLD AUTO: 9.1 % — SIGNIFICANT CHANGE UP (ref 2–14)
NEUTROPHILS # BLD AUTO: 2.54 K/UL — SIGNIFICANT CHANGE UP (ref 1.8–7.4)
NEUTROPHILS NFR BLD AUTO: 44 % — SIGNIFICANT CHANGE UP (ref 43–77)
PHOSPHATE SERPL-MCNC: 3.5 MG/DL — SIGNIFICANT CHANGE UP (ref 2.4–4.7)
PLATELET # BLD AUTO: 196 K/UL — SIGNIFICANT CHANGE UP (ref 150–400)
POTASSIUM SERPL-MCNC: 4.3 MMOL/L — SIGNIFICANT CHANGE UP (ref 3.5–5.3)
POTASSIUM SERPL-SCNC: 4.3 MMOL/L — SIGNIFICANT CHANGE UP (ref 3.5–5.3)
RBC # BLD: 4.12 M/UL — SIGNIFICANT CHANGE UP (ref 3.8–5.2)
RBC # FLD: 12.9 % — SIGNIFICANT CHANGE UP (ref 10.3–14.5)
SODIUM SERPL-SCNC: 139 MMOL/L — SIGNIFICANT CHANGE UP (ref 135–145)
WBC # BLD: 5.8 K/UL — SIGNIFICANT CHANGE UP (ref 3.8–10.5)
WBC # FLD AUTO: 5.8 K/UL — SIGNIFICANT CHANGE UP (ref 3.8–10.5)

## 2022-11-02 PROCEDURE — 99291 CRITICAL CARE FIRST HOUR: CPT

## 2022-11-02 PROCEDURE — 99233 SBSQ HOSP IP/OBS HIGH 50: CPT

## 2022-11-02 RX ORDER — GABAPENTIN 400 MG/1
400 CAPSULE ORAL EVERY 8 HOURS
Refills: 0 | Status: DISCONTINUED | OUTPATIENT
Start: 2022-11-02 | End: 2022-11-03

## 2022-11-02 RX ORDER — KETOROLAC TROMETHAMINE 30 MG/ML
15 SYRINGE (ML) INJECTION ONCE
Refills: 0 | Status: DISCONTINUED | OUTPATIENT
Start: 2022-11-02 | End: 2022-11-02

## 2022-11-02 RX ADMIN — Medication 15 MILLIGRAM(S): at 13:20

## 2022-11-02 RX ADMIN — LAMOTRIGINE 25 MILLIGRAM(S): 25 TABLET, ORALLY DISINTEGRATING ORAL at 17:51

## 2022-11-02 RX ADMIN — GABAPENTIN 400 MILLIGRAM(S): 400 CAPSULE ORAL at 21:27

## 2022-11-02 RX ADMIN — OXYCODONE HYDROCHLORIDE 10 MILLIGRAM(S): 5 TABLET ORAL at 07:49

## 2022-11-02 RX ADMIN — Medication 975 MILLIGRAM(S): at 07:46

## 2022-11-02 RX ADMIN — GABAPENTIN 400 MILLIGRAM(S): 400 CAPSULE ORAL at 14:36

## 2022-11-02 RX ADMIN — Medication 975 MILLIGRAM(S): at 17:50

## 2022-11-02 RX ADMIN — SENNA PLUS 2 TABLET(S): 8.6 TABLET ORAL at 21:27

## 2022-11-02 RX ADMIN — Medication 1 APPLICATION(S): at 17:51

## 2022-11-02 RX ADMIN — Medication 1 APPLICATION(S): at 05:14

## 2022-11-02 RX ADMIN — ENOXAPARIN SODIUM 40 MILLIGRAM(S): 100 INJECTION SUBCUTANEOUS at 22:12

## 2022-11-02 RX ADMIN — METHOCARBAMOL 750 MILLIGRAM(S): 500 TABLET, FILM COATED ORAL at 05:15

## 2022-11-02 RX ADMIN — POLYETHYLENE GLYCOL 3350 17 GRAM(S): 17 POWDER, FOR SOLUTION ORAL at 14:37

## 2022-11-02 RX ADMIN — OXYCODONE HYDROCHLORIDE 5 MILLIGRAM(S): 5 TABLET ORAL at 17:50

## 2022-11-02 RX ADMIN — Medication 15 MILLIGRAM(S): at 12:21

## 2022-11-02 RX ADMIN — OXYCODONE HYDROCHLORIDE 5 MILLIGRAM(S): 5 TABLET ORAL at 22:10

## 2022-11-02 RX ADMIN — Medication 975 MILLIGRAM(S): at 05:15

## 2022-11-02 RX ADMIN — OXYCODONE HYDROCHLORIDE 10 MILLIGRAM(S): 5 TABLET ORAL at 08:29

## 2022-11-02 RX ADMIN — GABAPENTIN 600 MILLIGRAM(S): 400 CAPSULE ORAL at 05:15

## 2022-11-02 RX ADMIN — Medication 975 MILLIGRAM(S): at 18:50

## 2022-11-02 RX ADMIN — OXYCODONE HYDROCHLORIDE 10 MILLIGRAM(S): 5 TABLET ORAL at 01:29

## 2022-11-02 RX ADMIN — LAMOTRIGINE 25 MILLIGRAM(S): 25 TABLET, ORALLY DISINTEGRATING ORAL at 05:15

## 2022-11-02 RX ADMIN — METHOCARBAMOL 750 MILLIGRAM(S): 500 TABLET, FILM COATED ORAL at 12:22

## 2022-11-02 RX ADMIN — METHOCARBAMOL 750 MILLIGRAM(S): 500 TABLET, FILM COATED ORAL at 21:28

## 2022-11-02 NOTE — PROGRESS NOTE ADULT - ASSESSMENT
62F no significant PMH [place] [facility] [place] +HS, -LOC, AC/AP use, presented with likely central cord phenomenon.       Plan  - Neuro checks  - No acute neurosurgical intervention indicated at this time  - Continue C collar at all times  - Maintain MAP > 80 x total of 3 days for spinal cord perfusion  - Pain control as needed, avoid over sedation. Gabapentin 600 TID  - Further medical care per primary team  - D/w Dr. Bullock 62F no significant PMH presented as a transfer from [facility] [place] s/p mechanical fall +HS, -LOC, AC/AP use, presented with likely central cord phenomenon.       Plan  - Q2 neuro checks  - No acute neurosurgical intervention indicated at this time  - Continue Cervical collar at all times  - Pain control as needed, avoid over sedation. Gabapentin 600 TID  - Recommend Pain Management consult  - Recommend Psych consult  - Continued PT/OT  - OOB w/ collar at all times  - Explained in detail regarding disease process of central cord syndrome and symptom relief will take weeks-months. Strict cervical collar at all times. Discussed getting pain management consult and importance to work with physical therapy.   - At this time, patient requesting 2nd opinion regarding dx  - Medical management/supportive care per primary team   - D/w Dr. Bullock

## 2022-11-02 NOTE — PROVIDER CONTACT NOTE (OTHER) - SITUATION
patient feels "falling from a ship...felt like I was passing out...my legs are twitching...feels dizzy...

## 2022-11-02 NOTE — CHART NOTE - NSCHARTNOTEFT_GEN_A_CORE
SICU TRANSFER NOTE  -----------------------------  ICU Admission Date: 10/30/22  Transfer Date: 11-02-22 @ 14:52    HPI:  62F w h/o rt sided radiculopathy s/p melanoma excision who presented as a transfer from [facility] [place] on 10/30 for continued work up s/p mechanical fall with head strike, no LOC, and no AC/AP use. Patient with superficial facial abrasions  s/p lip laceration repair by ED and b/l paresthesias over radial surface of hands/wrist. Strength intact in UE and LE, no LE neuropathy noted, no urinary retention. CT head/max/face/ A/P negative for any acute traumatic injuries. CT C-spine showing C4/C5 spinal stenosis, B/l hand XR and right knee XR wnl, MR Cervical Spine with cord compression at C3/C4 and C4/C5 levels without cord signal abnormality. Patient Admitted to SICU for monitoring 2/2 suspected central cord phenomenon.    During SICU stay patient was HD and neurologically stable with gradual improvement in UE paresthesias on gabapentin and lamictal 25 BID. Gabapentin decreased from 600 to 400mg TID due to side effects. C collar in place since admission. Patient automapping at >80 and voiding spontaneously.      Consultants:  [ ] Cardiology  [ ] Endocrine  [ ] Infectious Disease  [ ] Medicine  [x]Neurosurgery  [ ] Ortho       [ ] Weight Bearing Status:  [ ] Palliative       [ ] Advanced Directives:    [ ] Physical Medicine and Rehab       [ ] Disposition :   [ ] Plastics  [ ] Pulmonary    Medications  acetaminophen     Tablet .. 975 milliGRAM(s) Oral every 8 hours PRN  BACItracin   Ointment 1 Application(s) Topical two times a day  enoxaparin Injectable 40 milliGRAM(s) SubCutaneous every 24 hours  gabapentin 400 milliGRAM(s) Oral every 8 hours  influenza   Vaccine 0.5 milliLiter(s) IntraMuscular once  lamoTRIgine 25 milliGRAM(s) Oral every 12 hours  methocarbamol 750 milliGRAM(s) Oral every 8 hours  oxyCODONE    IR 10 milliGRAM(s) Oral every 4 hours PRN  oxyCODONE    IR 5 milliGRAM(s) Oral every 4 hours PRN  polyethylene glycol 3350 17 Gram(s) Oral every 12 hours PRN  senna 2 Tablet(s) Oral at bedtime    [x] I attest I have reviewed and reconciled all medications prior to transfer    A/P:    Neuro:   Consistently AAOx3, neurologic status stable, neurosurg following  C/w c collar, toradol and tylenol prn, robaxin q8h, gabapentin TID, and lamictal BID  For pain management and PT/OT consults    CV:  HD stable, automapping >80 for >72h, int decreases in MAP 2/2 narcotics w spontaneous resolution, continue to monitor    Pulm: on RA    GI/Nutrition:  c/w regular diet, c/w bowel regimen    /Renal: voiding spontaneously, c/w PO hydration    ID: persistently afebrile without leukocytosis    Heme: H/H stable    Dvt ppx: SCDS, lovenox, ambulation as tolerated    Endo: euglycemic    Skin:  c/w bacitracin for facial abrasions    Dispo: floor    Plan discussed with Trauma surgery team.  SICU Provider's Contact #: [phone] SICU TRANSFER NOTE  -----------------------------  ICU Admission Date: 10/30/22  Transfer Date: 11-02-22 @ 14:52    HPI:  62F w h/o rt sided radiculopathy s/p melanoma excision who [place] [facility] [place] continued work up s/p mechanical fall with head strike, no LOC, and no AC/AP use. Patient with superficial facial abrasions  s/p lip laceration repair by ED and b/l paresthesias over radial surface of hands/wrist. Strength intact in UE and LE, no LE neuropathy noted, no urinary retention. CT head/max/face/ A/P negative for any acute traumatic injuries. CT C-spine showing C4/C5 spinal stenosis, B/l hand XR and right knee XR wnl, MR Cervical Spine with cord compression at C3/C4 and C4/C5 levels without cord signal abnormality. Patient Admitted to SICU for monitoring 2/2 suspected central cord phenomenon.    During SICU stay patient was HD and neurologically stable with gradual improvement in UE paresthesias on gabapentin and lamictal\. Gabapentin decreased from 600 to 400mg TID due to side effects. C collar in place since admission. Patient automapping at >80 and voiding spontaneously.      Consultants:  [ ] Cardiology  [ ] Endocrine  [ ] Infectious Disease  [ ] Medicine  [x]Neurosurgery  [ ] Ortho       [ ] Weight Bearing Status:  [ ] Palliative       [ ] Advanced Directives:    [ ] Physical Medicine and Rehab       [ ] Disposition :   [ ] Plastics  [ ] Pulmonary    Medications  acetaminophen     Tablet .. 975 milliGRAM(s) Oral every 8 hours PRN  BACItracin   Ointment 1 Application(s) Topical two times a day  enoxaparin Injectable 40 milliGRAM(s) SubCutaneous every 24 hours  gabapentin 400 milliGRAM(s) Oral every 8 hours  influenza   Vaccine 0.5 milliLiter(s) IntraMuscular once  lamoTRIgine 25 milliGRAM(s) Oral every 12 hours  methocarbamol 750 milliGRAM(s) Oral every 8 hours  oxyCODONE    IR 10 milliGRAM(s) Oral every 4 hours PRN  oxyCODONE    IR 5 milliGRAM(s) Oral every 4 hours PRN  polyethylene glycol 3350 17 Gram(s) Oral every 12 hours PRN  senna 2 Tablet(s) Oral at bedtime    [x] I attest I have reviewed and reconciled all medications prior to transfer    A/P:    Neuro:   Consistently AAOx3, neurologic status stable, neurosurg following  C/w c collar, toradol and tylenol prn, robaxin q8h, gabapentin TID, and lamictal BID  For pain management and PT/OT consults    CV:  HD stable, automapping >80 for >72h, int decreases in MAP 2/2 narcotics w spontaneous resolution, continue to monitor    Pulm: on RA    GI/Nutrition:  c/w regular diet, c/w bowel regimen    /Renal: voiding spontaneously, c/w PO hydration    ID: persistently afebrile without leukocytosis    Heme: H/H stable    Dvt ppx: SCDS, lovenox, ambulation as tolerated    Endo: euglycemic    Skin:  c/w bacitracin for facial abrasions    Dispo: floor    Plan discussed with Trauma surgery team.  SICU Provider's Contact #: [phone] SICU TRANSFER NOTE  -----------------------------  ICU Admission Date: 10/30/22  Transfer Date: 11-02-22 @ 14:52    HPI:  62F w h/o rt sided radiculopathy s/p melanoma excision [place] [facility] [place] work up s/p mechanical fall with head strike, no LOC, and no AC/AP use. Patient with superficial facial abrasions  s/p lip laceration repair by ED and b/l paresthesias over radial surface of hands/wrist. Strength intact in UE and LE, no LE neuropathy noted, no urinary retention. CT head/max/face/ A/P negative for any acute traumatic injuries. CT C-spine showing C4/C5 spinal stenosis, B/l hand XR and right knee XR wnl, MR Cervical Spine with cord compression at C3/C4 and C4/C5 levels without cord signal abnormality. Patient Admitted to SICU for monitoring 2/2 suspected central cord phenomenon.    During SICU stay patient was HD and neurologically stable with gradual improvement in UE paresthesias on gabapentin and lamictal\. Gabapentin decreased from 600 to 400mg TID due to side effects. C collar in place since admission. Patient automapping at >80 and voiding spontaneously.      Consultants:  [ ] Cardiology  [ ] Endocrine  [ ] Infectious Disease  [ ] Medicine  [x]Neurosurgery  [ ] Ortho       [ ] Weight Bearing Status:  [ ] Palliative       [ ] Advanced Directives:    [ ] Physical Medicine and Rehab       [ ] Disposition :   [ ] Plastics  [ ] Pulmonary    Medications  acetaminophen     Tablet .. 975 milliGRAM(s) Oral every 8 hours PRN  BACItracin   Ointment 1 Application(s) Topical two times a day  enoxaparin Injectable 40 milliGRAM(s) SubCutaneous every 24 hours  gabapentin 400 milliGRAM(s) Oral every 8 hours  influenza   Vaccine 0.5 milliLiter(s) IntraMuscular once  lamoTRIgine 25 milliGRAM(s) Oral every 12 hours  methocarbamol 750 milliGRAM(s) Oral every 8 hours  oxyCODONE    IR 10 milliGRAM(s) Oral every 4 hours PRN  oxyCODONE    IR 5 milliGRAM(s) Oral every 4 hours PRN  polyethylene glycol 3350 17 Gram(s) Oral every 12 hours PRN  senna 2 Tablet(s) Oral at bedtime    [x] I attest I have reviewed and reconciled all medications prior to transfer    A/P:    Neuro:   Consistently AAOx3, neurologic status stable, neurosurg following  C/w c collar, toradol and tylenol prn, robaxin q8h, gabapentin TID, and lamictal BID  For pain management and PT/OT consults    CV:  HD stable, automapping >80 for >72h, int decreases in MAP 2/2 narcotics w spontaneous resolution, continue to monitor vitals q4h    Pulm: on RA    GI/Nutrition:  c/w regular diet, c/w bowel regimen    /Renal: voiding spontaneously, c/w PO hydration    ID: persistently afebrile without leukocytosis    Heme: H/H stable    Dvt ppx: SCDS, lovenox, ambulation as tolerated    Endo: euglycemic    Skin:  c/w bacitracin for facial abrasions    Dispo: floor    Plan discussed with Trauma surgery team.  SICU Provider's Contact #: [phone]

## 2022-11-02 NOTE — PROVIDER CONTACT NOTE (OTHER) - ACTION/TREATMENT ORDERED:
SICU KIEL Landa at bedside assessing patient, continue to monitor, neuro every hour, has no change in neuro, no sensation different

## 2022-11-02 NOTE — PROGRESS NOTE ADULT - ASSESSMENT
62yFemale s/p fall with acute traumatic central cord syndrome    Neuro: Central cord syndrome- continue neuro checks, cervical collar at all times. Continue adequate pain control and gabapentin, robaxin and lamictal however, limit narcotics as much as able given delirium yesterday.  Wean radiculopathy regimen given improvement.       CV:  MAPs>80 without intervention. Will likely downgrade today.     Pulm: Aggressive pulmoanry tolieting given 2L NC yesterday.  Patient is participating with IS.     GI/Nutrition:  Adequate enteral nutrition. Bowel regimen on board given narcotics.     /Renal: Voiding without issues. Lytes wnl.     ID:  No issues     Heme: SCDS + lovenox     Skin:   Bacitracin to facial abrasions      Dispo: Likely downgrade level of care today

## 2022-11-02 NOTE — PROGRESS NOTE ADULT - SUBJECTIVE AND OBJECTIVE BOX
INTERVAL HPI/OVERNIGHT EVENTS:  62 year old Female w/ PMHX presented as a transfer from [facility] [place] s/p mechanical fall +HS, -LOC, AC/AP use, presented with likely central cord phenomenon.   Patient seen and examined with neurosurgery team.     Vital Signs Last 24 Hrs  T(C): 36.8 (02 Nov 2022 07:23), Max: 36.9 (01 Nov 2022 19:24)  T(F): 98.3 (02 Nov 2022 07:23), Max: 98.5 (01 Nov 2022 19:24)  HR: 68 (02 Nov 2022 11:07) (59 - 78)  BP: 105/56 (02 Nov 2022 11:07) (78/47 - 129/74)  BP(mean): 68 (02 Nov 2022 11:07) (56 - 90)  RR: 14 (02 Nov 2022 11:07) (10 - 21)  SpO2: 96% (02 Nov 2022 11:07) (92% - 99%)  Parameters below as of 02 Nov 2022 04:30  Patient On (Oxygen Delivery Method): nasal cannula, O2 Flow (L/min): 2    PHYSICAL EXAM:  GENERAL: NAD, well-groomed  HEAD: Facial lacs, normocephalic  NECK: Cervical collar on  MENTAL STATUS: AAO x3; Awake. Opens eyes spontaneously. Appropriately conversant without aphasia, following simple commands.  CRANIAL NERVES: Visual acuity normal for age, visual fields full to confrontation, PERRL. EOMI without nystagmus. Facial sensation intact V1-3 distribution b/l. Face symmetric w/ normal eye closure and smile, tongue midline. Hearing grossly intact. Speech clear.   MOTOR: strength 5/5 b/l upper and lower extremities aside from b/l hand  which is 3/5 secondary to painful paresthesias  SENSATION: grossly intact to light touch all extremities, increased sensation b/l hands among the lateral aspect    LABS:             12.7   5.80  )-----------( 196      ( 02 Nov 2022 03:50 )             38.8     11-02    139  |  102  |  10.6  ----------------------------<  108<H>  4.3   |  29.0  |  0.65    Ca    8.6      02 Nov 2022 07:00  Phos  3.5     11-02  Mg     1.9     11-02 11-01 @ 07:01 - 11-02 @ 07:00  --------------------------------------------------------  IN: 250 mL / OUT: 2800 mL / NET: -2550 mL    11-02 @ 07:01  -  11-02 @ 12:00  --------------------------------------------------------  IN: 250 mL / OUT: 0 mL / NET: 250 mL      RADIOLOGY & ADDITIONAL STUDIES:  MR Cervical Spine No Cont (10.30.22 @ 03:56)  IMPRESSION:  Cord compression at C3/C4 and C4/C5 levels without cord signal   abnormality on T2 sagittal.  Likely Modic endplate degenerative signal   changes at C4/C5 level. If there is clinical concern for early   discitis/osteomyelitis consider follow-up postcontrast images.  This case was discussed with Dr. Ramirez at 5:25 a.m. central standard   time on 10/30/2022 by Dr. TOREY MIMS.  Preliminary report provided by [facility] TOREY MIMS. INTERVAL HPI/OVERNIGHT EVENTS:  62 year old Female w/ PMHX presented as a transfer from [facility] [place] s/p mechanical fall +HS, -LOC, AC/AP use, presented with likely central cord phenomenon.   Patient seen and examined with Dr. Bullock and neurosurgery team. Explained in detail disease regarding process of central cord syndrome and symptom relief will take weeks-months. Discussed getting pain management consult and importance to work with physical therapy. Patient states she felt dysesthesias in all extremities this AM, has not been eating/drinking enough per patient. Discussed need for nutrition. Patient states the hyperesthesias in left hand has improved.     Vital Signs Last 24 Hrs  T(C): 36.8 (02 Nov 2022 07:23), Max: 36.9 (01 Nov 2022 19:24)  T(F): 98.3 (02 Nov 2022 07:23), Max: 98.5 (01 Nov 2022 19:24)  HR: 68 (02 Nov 2022 11:07) (59 - 78)  BP: 105/56 (02 Nov 2022 11:07) (78/47 - 129/74)  BP(mean): 68 (02 Nov 2022 11:07) (56 - 90)  RR: 14 (02 Nov 2022 11:07) (10 - 21)  SpO2: 96% (02 Nov 2022 11:07) (92% - 99%)  Parameters below as of 02 Nov 2022 04:30  Patient On (Oxygen Delivery Method): nasal cannula, O2 Flow (L/min): 2    PHYSICAL EXAM:  GENERAL: NAD, well-groomed, overweight  HEAD: Facial lacs, normocephalic  NECK: Cervical collar on  MENTAL STATUS: AAO x3; Awake. Opens eyes spontaneously. Appropriately conversant without aphasia, following simple commands.  CRANIAL NERVES: Visual acuity normal for age, PERRL. EOMI without nystagmus. Facial sensation intact V1-3 distribution b/l. Face symmetric w/ normal eye closure and smile, tongue midline. Hearing grossly intact. Speech clear.   MOTOR: strength 5/5 b/l upper and lower extremities aside from b/l hand  which is 3/5 secondary to painful paresthesias  SENSATION: grossly intact to light touch all extremities, increased sensation b/l hands among the lateral aspect    LABS:             12.7   5.80  )-----------( 196      ( 02 Nov 2022 03:50 )             38.8     11-02    139  |  102  |  10.6  ----------------------------<  108<H>  4.3   |  29.0  |  0.65    Ca    8.6      02 Nov 2022 07:00  Phos  3.5     11-02  Mg     1.9     11-02 11-01 @ 07:01 - 11-02 @ 07:00  --------------------------------------------------------  IN: 250 mL / OUT: 2800 mL / NET: -2550 mL    11-02 @ 07:01  -  11-02 @ 12:00  --------------------------------------------------------  IN: 250 mL / OUT: 0 mL / NET: 250 mL      RADIOLOGY & ADDITIONAL STUDIES:  MR Cervical Spine No Cont (10.30.22 @ 03:56)  IMPRESSION:  Cord compression at C3/C4 and C4/C5 levels without cord signal   abnormality on T2 sagittal.  Likely Modic endplate degenerative signal   changes at C4/C5 level. If there is clinical concern for early   discitis/osteomyelitis consider follow-up postcontrast images.  This case was discussed with Dr. Ramirez at 5:25 a.m. central standard   time on 10/30/2022 by Dr. TOREY MIMS.  Preliminary report provided by [facility] TOREY MIMS.

## 2022-11-02 NOTE — PROGRESS NOTE ADULT - SUBJECTIVE AND OBJECTIVE BOX
24 HOUR EVENTS: Patient is doing well, slept well. Patients paresthesias significantly improved. Patient denies pain. MAPs>80 without intervention. Tolerating diet. Voiding without issues.     SUBJECTIVE/ROS:  [x ] A ten-point review of systems was otherwise negative except as noted.  [ ] Due to altered mental status/intubation, subjective information were not able to be obtained from the patient. History was obtained, to the extent possible, from review of the chart and collateral sources of information.      NEURO  RASS: 0     GCS: 15    CAM ICU: negative   Exam: AAOx3, strength 5/5 b/l extremities, sensation intact, hand grip3/5   sensation grossly intact to touch all extremities, increased sensation b/l hands among the lateral aspect  Meds: acetaminophen     Tablet .. 975 milliGRAM(s) Oral every 8 hours PRN Mild Pain (1 - 3)  gabapentin 600 milliGRAM(s) Oral three times a day  HYDROmorphone  Injectable 0.5 milliGRAM(s) IV Push every 4 hours PRN breakthrough pain  lamoTRIgine 25 milliGRAM(s) Oral every 12 hours  methocarbamol 750 milliGRAM(s) Oral every 8 hours  oxyCODONE    IR 10 milliGRAM(s) Oral every 4 hours PRN Severe Pain (7 - 10)  oxyCODONE    IR 5 milliGRAM(s) Oral every 4 hours PRN Moderate Pain (4 - 6)    [x] Adequacy of sedation and pain control has been assessed and adjusted      RESPIRATORY  RR: 12 (11-02-22 @ 02:00) (10 - 23)  SpO2: 99% (11-02-22 @ 02:00) (93% - 99%)  Wt(kg): --  Exam: unlabored, clear to auscultation bilaterally  Mechanical Ventilation:     [ ] Extubation Readiness Assessed  Meds:       CARDIOVASCULAR  HR: 59 (11-02-22 @ 02:00) (59 - 77)  BP: 117/65 (11-02-22 @ 02:00) (101/60 - 129/74)  BP(mean): 78 (11-02-22 @ 02:00) (73 - 103)  ABP: --  ABP(mean): --  Wt(kg): --  CVP(cm H2O): --      Exam: nsr  Cardiac Rhythm: nsr  Perfusion     [x ]Adequate   [ ]Inadequate  Mentation   [x ]Normal       [ ]Reduced  Extremities  [x ]Warm         [ ]Cool  Volume Status [ ]Hypervolemic [x ]Euvolemic [ ]Hypovolemic  Meds:       GI/NUTRITION  Exam: obese, soft, nttp, nd  Diet: Regular diet   Meds: polyethylene glycol 3350 17 Gram(s) Oral every 12 hours PRN Constipation  senna 2 Tablet(s) Oral at bedtime      GENITOURINARY  I&O's Detail    10-31 @ 07:01  -  11-01 @ 07:00  --------------------------------------------------------  IN:    IV PiggyBack: 100 mL    Lactated Ringers Bolus: 500 mL    multiple electrolytes Injection Type 1.: 1100 mL  Total IN: 1700 mL    OUT:    Voided (mL): 750 mL  Total OUT: 750 mL    Total NET: 950 mL      11-01 @ 07:01  -  11-02 @ 03:32  --------------------------------------------------------  IN:    multiple electrolytes Injection Type 1.: 250 mL  Total IN: 250 mL    OUT:    Voided (mL): 2800 mL  Total OUT: 2800 mL    Total NET: -2550 mL          11-01    137  |  102  |  7.9<L>  ----------------------------<  102<H>  4.2   |  27.0  |  0.62    Ca    8.2<L>      01 Nov 2022 03:45  Phos  2.7     11-01  Mg     1.8     11-01      [ ] Gates catheter, indication: N/A  Meds:       HEMATOLOGIC  Meds: enoxaparin Injectable 40 milliGRAM(s) SubCutaneous every 24 hours    [x] VTE Prophylaxis      Transfusion     [ ] PRBC   [ ] Platelets   [ ] FFP   [ ] Cryoprecipitate      INFECTIOUS DISEASES  T(C): 36.7 (11-01-22 @ 23:29), Max: 37.4 (11-01-22 @ 12:00)  Wt(kg): --    Recent Cultures:    Meds: influenza   Vaccine 0.5 milliLiter(s) IntraMuscular once        ENDOCRINE  Capillary Blood Glucose    Meds:       ACCESS DEVICES:  [ ] Peripheral IV  [ ] Central Venous Line	[ ] R	[ ] L	[ ] IJ	[ ] Fem	[ ] SC	Placed:   [ ] Arterial Line		[ ] R	[ ] L	[ ] Fem	[ ] Rad	[ ] Ax	Placed:   [ ] PICC:					[ ] Mediport  [ ] Urinary Catheter, Date Placed:   [ ] Necessity of urinary, arterial, and venous catheters discussed    OTHER MEDICATIONS:  BACItracin   Ointment 1 Application(s) Topical two times a day  chlorhexidine 2% Cloths 1 Application(s) Topical daily      CODE STATUS:     IMAGING:

## 2022-11-03 ENCOUNTER — TRANSCRIPTION ENCOUNTER (OUTPATIENT)
Age: 62
End: 2022-11-03

## 2022-11-03 PROCEDURE — 99223 1ST HOSP IP/OBS HIGH 75: CPT | Mod: GC

## 2022-11-03 PROCEDURE — 99291 CRITICAL CARE FIRST HOUR: CPT

## 2022-11-03 RX ORDER — POLYETHYLENE GLYCOL 3350 17 G/17G
17 POWDER, FOR SOLUTION ORAL
Qty: 0 | Refills: 0 | DISCHARGE
Start: 2022-11-03

## 2022-11-03 RX ORDER — ACETAMINOPHEN 500 MG
1000 TABLET ORAL ONCE
Refills: 0 | Status: COMPLETED | OUTPATIENT
Start: 2022-11-04 | End: 2022-11-04

## 2022-11-03 RX ORDER — ACETAMINOPHEN 500 MG
3 TABLET ORAL
Qty: 0 | Refills: 0 | DISCHARGE
Start: 2022-11-03

## 2022-11-03 RX ORDER — TRAMADOL HYDROCHLORIDE 50 MG/1
25 TABLET ORAL EVERY 4 HOURS
Refills: 0 | Status: DISCONTINUED | OUTPATIENT
Start: 2022-11-03 | End: 2022-11-05

## 2022-11-03 RX ORDER — TRAMADOL HYDROCHLORIDE 50 MG/1
50 TABLET ORAL EVERY 4 HOURS
Refills: 0 | Status: DISCONTINUED | OUTPATIENT
Start: 2022-11-03 | End: 2022-11-05

## 2022-11-03 RX ORDER — BACITRACIN ZINC 500 UNIT/G
1 OINTMENT IN PACKET (EA) TOPICAL
Qty: 0 | Refills: 0 | DISCHARGE
Start: 2022-11-03

## 2022-11-03 RX ORDER — SENNA PLUS 8.6 MG/1
2 TABLET ORAL
Qty: 0 | Refills: 0 | DISCHARGE
Start: 2022-11-03

## 2022-11-03 RX ORDER — ACETAMINOPHEN 500 MG
1000 TABLET ORAL ONCE
Refills: 0 | Status: COMPLETED | OUTPATIENT
Start: 2022-11-03 | End: 2022-11-03

## 2022-11-03 RX ORDER — SODIUM CHLORIDE 9 MG/ML
500 INJECTION INTRAMUSCULAR; INTRAVENOUS; SUBCUTANEOUS ONCE
Refills: 0 | Status: COMPLETED | OUTPATIENT
Start: 2022-11-03 | End: 2022-11-03

## 2022-11-03 RX ORDER — GABAPENTIN 400 MG/1
300 CAPSULE ORAL THREE TIMES A DAY
Refills: 0 | Status: DISCONTINUED | OUTPATIENT
Start: 2022-11-03 | End: 2022-11-05

## 2022-11-03 RX ORDER — KETOROLAC TROMETHAMINE 30 MG/ML
15 SYRINGE (ML) INJECTION THREE TIMES A DAY
Refills: 0 | Status: DISCONTINUED | OUTPATIENT
Start: 2022-11-03 | End: 2022-11-05

## 2022-11-03 RX ADMIN — OXYCODONE HYDROCHLORIDE 5 MILLIGRAM(S): 5 TABLET ORAL at 05:38

## 2022-11-03 RX ADMIN — GABAPENTIN 300 MILLIGRAM(S): 400 CAPSULE ORAL at 13:32

## 2022-11-03 RX ADMIN — Medication 400 MILLIGRAM(S): at 09:15

## 2022-11-03 RX ADMIN — LAMOTRIGINE 25 MILLIGRAM(S): 25 TABLET, ORALLY DISINTEGRATING ORAL at 05:35

## 2022-11-03 RX ADMIN — Medication 400 MILLIGRAM(S): at 17:13

## 2022-11-03 RX ADMIN — OXYCODONE HYDROCHLORIDE 5 MILLIGRAM(S): 5 TABLET ORAL at 11:19

## 2022-11-03 RX ADMIN — Medication 15 MILLIGRAM(S): at 13:31

## 2022-11-03 RX ADMIN — Medication 1000 MILLIGRAM(S): at 09:45

## 2022-11-03 RX ADMIN — SODIUM CHLORIDE 500 MILLILITER(S): 9 INJECTION INTRAMUSCULAR; INTRAVENOUS; SUBCUTANEOUS at 01:36

## 2022-11-03 RX ADMIN — METHOCARBAMOL 750 MILLIGRAM(S): 500 TABLET, FILM COATED ORAL at 05:35

## 2022-11-03 RX ADMIN — OXYCODONE HYDROCHLORIDE 5 MILLIGRAM(S): 5 TABLET ORAL at 12:19

## 2022-11-03 RX ADMIN — OXYCODONE HYDROCHLORIDE 5 MILLIGRAM(S): 5 TABLET ORAL at 06:38

## 2022-11-03 RX ADMIN — Medication 10 MILLIGRAM(S): at 13:43

## 2022-11-03 RX ADMIN — LAMOTRIGINE 25 MILLIGRAM(S): 25 TABLET, ORALLY DISINTEGRATING ORAL at 17:13

## 2022-11-03 RX ADMIN — METHOCARBAMOL 750 MILLIGRAM(S): 500 TABLET, FILM COATED ORAL at 21:58

## 2022-11-03 RX ADMIN — Medication 15 MILLIGRAM(S): at 22:00

## 2022-11-03 RX ADMIN — Medication 15 MILLIGRAM(S): at 22:01

## 2022-11-03 RX ADMIN — METHOCARBAMOL 750 MILLIGRAM(S): 500 TABLET, FILM COATED ORAL at 13:32

## 2022-11-03 RX ADMIN — ENOXAPARIN SODIUM 40 MILLIGRAM(S): 100 INJECTION SUBCUTANEOUS at 21:59

## 2022-11-03 RX ADMIN — Medication 1 APPLICATION(S): at 17:15

## 2022-11-03 RX ADMIN — SENNA PLUS 2 TABLET(S): 8.6 TABLET ORAL at 21:58

## 2022-11-03 RX ADMIN — GABAPENTIN 400 MILLIGRAM(S): 400 CAPSULE ORAL at 05:35

## 2022-11-03 RX ADMIN — GABAPENTIN 300 MILLIGRAM(S): 400 CAPSULE ORAL at 21:58

## 2022-11-03 RX ADMIN — Medication 1 APPLICATION(S): at 05:35

## 2022-11-03 NOTE — CONSULT NOTE ADULT - SUBJECTIVE AND OBJECTIVE BOX
HPI: Ms. Scott is a 63 yo F who presented to ED s/p mechanical fall while in her driveway. Patient hit her face on the ground, landing face forward on her arm. Patient denies any LOC. + Headstrike. Not on ASA/AC. She presented to [facility] [place] with complaints of bilateral numbness/tingling in hands, as well as RLE numbness/weakness (she does endorse previous  radiculopathy s/p melanoma excision). CT head/max/face/ A/P negative for any acute traumatic injuries. CT C-spine demonstrates C4/C5 spinal stenosis. Patient was transferred to [facility] for MRI, and further evaluation, which showed cord compression at C3/C4 and C4/C5 levels. She states she was previously having urinary incontinence, now she feels the urge to urinate immediately before urination. She has not had a bowel movement since hospitalization. Bilateral hand weakness is improving though both hands still very sensitive to touch.    Imaging performed:  MRI C spine 10/30/22- Cord compression at C3/C4 and C4/C5 levels without cord signal abnormality on T2 sagittal.  Likely Modic endplate degenerative signal changes at C4/C5 level. If there is clinical concern for early discitis/osteomyelitis consider follow-up postcontrast images.  X ray R knee 10/30/22-No radiographic osseous pathology.    REVIEW OF SYSTEMS  Constitutional + fatigue  Respiratory - No cough, No wheezing, No shortness of breath  Cardiovascular - No chest pain, No palpitations  Gastrointestinal - No abdominal pain, No nausea, No vomiting, +constipation  Neurological +headaches, No memory loss, + loss of strength, + numbness  Psychiatric - No depression, No anxiety    VITALS  T(C): 36.6 (11-03-22 @ 07:15), Max: 36.8 (11-02-22 @ 19:46)  HR: 65 (11-03-22 @ 11:00) (55 - 87)  BP: 110/72 (11-03-22 @ 11:00) (83/49 - 153/77)  RR: 15 (11-03-22 @ 11:00) (12 - 27)  SpO2: 100% (11-03-22 @ 11:00) (88% - 100%)    PAST MEDICAL & SURGICAL HISTORY  No pertinent past medical history    History of melanoma excision    History of appendectomy    History of cholecystectomy    H/O right breast biopsy    History of tonsillectomy    FUNCTIONAL HISTORY  Lives with  in a house, 9 steps to enter, 18 steps to bedroom, but can stay on the first floor  Independent with ADL's and ambulation    CURRENT FUNCTIONAL STATUS  11/1 PT    Bed Mobility: Supine to Sit:     · Level of Wilson	minimum assist (75% patients effort)  · Physical Assist/Nonphysical Assist	1 person assist    Bed Mobility Analysis:     · Bed Mobility Limitations	decreased ability to use arms for pushing/pulling; impaired ability to control trunk for mobility  · Impairments Contributing to Impaired Bed Mobility	impaired balance; pain; decreased strength    Transfer: Chair to Bed:     · Level of Wilson	minimum assist (75% patients effort)  · Physical Assist/Nonphysical Assist	1 person assist  · Weight-Bearing Restrictions	weight-bearing as tolerated  · Assistive Device	assist under Right arm, Pt's hands hypersensitive at this time.    Transfer: Sit to Stand:     · Level of Wilson	minimum assist (75% patients effort)  · Physical Assist/Nonphysical Assist	1 person assist  · Weight-Bearing Restrictions	weight-bearing as tolerated    Transfer: Stand to Sit:     · Level of Wilson	minimum assist (75% patients effort)  · Physical Assist/Nonphysical Assist	1 person assist  · Weight-Bearing Restrictions	weight-bearing as tolerated  · Assistive Device	assist under Right arm, Pt's hands hypersensitive at this time.    Sit/Stand Transfer Safety Analysis:     · Transfer Safety Concerns Noted	decreased balance during turns; decreased weight-shifting ability  · Impairments Contributing to Impaired Transfers	impaired balance; decreased strength; pain    Gait Skills:     · Level of Wilson	bed to chair only.    Stair Negotiation:     · Level of Wilson	TBA    Balance Skills Assessment:     · Sitting Balance: Static	good balance  · Sitting Balance: Dynamic	good balance  · Sit-to-Stand Balance	fair balance  · Standing Balance: Static	good minus  · Standing Balance: Dynamic	fair balance  · Systems Impairment Contributing to Balance Disturbance	musculoskeletal; neuromuscular  · Identified Impairments Contributing to Balance Disturbance	impaired postural control; decreased strength; pain    Sensory Examination:    Sensory Examination:  · Coordination Assessed	finger to nose; mild dysmetria noted in BUEs      Proprioception:   · Coordination Assessed	finger to nose; mild dysmetria noted in BUEs    · Sensory Tests	(+) eye tracking in Bilateral eyes to central/lateral/inferior fields, difficulty following to bilateral lateral/superior fields. Aquity impaired in all peripheral fields, intact in central field      10/1 OT  Bathing Training:     · Level of Wilson	moderate assist (50% patients effort)    Upper Body Dressing Training:     · Level of Wilson	minimum assist (75% patients effort)    Lower Body Dressing Training:     · Level of Wilson	maximum assist (25% patients effort)    Toilet Hygiene Training:     · Level of Wilson	moderate assist (50% patients effort)    Grooming Training:     · Level of Wilson	minimum assist (75% patients effort)    Eating/Self-Feeding Training:     · Level of Wilson	TBA      SOCIAL HISTORY - as per documentation/history  Smoking - None  EtOH - None  Drugs - None    FAMILY HISTORY- reviewed.     RECENT LABS - Reviewed  CBC Full  -  ( 02 Nov 2022 03:50 )  WBC Count : 5.80 K/uL  RBC Count : 4.12 M/uL  Hemoglobin : 12.7 g/dL  Hematocrit : 38.8 %  Platelet Count - Automated : 196 K/uL  Mean Cell Volume : 94.2 fl  Mean Cell Hemoglobin : 30.8 pg  Mean Cell Hemoglobin Concentration : 32.7 gm/dL  Auto Neutrophil # : 2.54 K/uL  Auto Lymphocyte # : 2.52 K/uL  Auto Monocyte # : 0.53 K/uL  Auto Eosinophil # : 0.17 K/uL  Auto Basophil # : 0.02 K/uL  Auto Neutrophil % : 44.0 %  Auto Lymphocyte % : 43.4 %  Auto Monocyte % : 9.1 %  Auto Eosinophil % : 2.9 %  Auto Basophil % : 0.3 %    11-02    139  |  102  |  10.6  ----------------------------<  108<H>  4.3   |  29.0  |  0.65    Ca    8.6      02 Nov 2022 07:00  Phos  3.5     11-02  Mg     1.9     11-02          ALLERGIES  CILANTRO (Anaphylaxis (Severe))  Demerol (Flushing; Other (Severe))      MEDICATIONS   acetaminophen   IVPB .. 1000 milliGRAM(s) IV Intermittent once  BACItracin   Ointment 1 Application(s) Topical two times a day  bisacodyl Suppository 10 milliGRAM(s) Rectal once  enoxaparin Injectable 40 milliGRAM(s) SubCutaneous every 24 hours  gabapentin 300 milliGRAM(s) Oral three times a day  influenza   Vaccine 0.5 milliLiter(s) IntraMuscular once  ketorolac   Injectable 15 milliGRAM(s) IV Push three times a day  lamoTRIgine 25 milliGRAM(s) Oral every 12 hours  methocarbamol 750 milliGRAM(s) Oral every 8 hours  oxyCODONE    IR 5 milliGRAM(s) Oral every 4 hours PRN  oxyCODONE    IR 10 milliGRAM(s) Oral every 4 hours PRN  polyethylene glycol 3350 17 Gram(s) Oral every 12 hours PRN  senna 2 Tablet(s) Oral at bedtime      ----------------------------------------------------------------------------------------  PHYSICAL EXAM  Constitutional - NAD, Comfortable  HEENT - C collar in place, multiple abrasions on face  Neck - Supple, No limited ROM  Chest - Breathing comfortably, No wheezing  Cardiovascular - S1S2   Abdomen - Soft   Extremities - No C/C/E, No calf tenderness   Neurologic Exam -                    Cognitive - AAO to self, place, date, year, situation     Communication - Fluent, No dysarthria     Cranial Nerves - CN 2-12 intact     Motor - limited due to pain/hypersensitivity in hands                    LEFT    UE - ShAB 5/5, EF 5/5, EE 5/5, WE 4/5,  3/5                    RIGHT UE - ShAB 5/5, EF 5/5, EE 5/5, WE 4/5,  2/5                    LEFT    LE - HF 5/5, KE 5/5, DF 5/5, PF 5/5                    RIGHT LE - HF 5/5, KE 5/5, DF 5/5, PF 5/5        Sensory - allodynia on both hands, right worse than left, hypersensitive to touch on bilateral hands  Psychiatric - Mood stable, Affect WNL  ----------------------------------------------------------------------------------------  ASSESSMENT/PLAN  62yFemale with functional deficits after fall with central cord syndrome   Central cord syndrome-monitoring, hand strength improving though hands hypersensitive with allodynia, Pain management following.  Pain - Tylenol IVPB, Lamictal, Toradol, Gabapentin, Robaxin, Oxycodone PRN. Recommend discontinuing Lamictal.    DVT PPX - SCDs, Lovenox  Rehab- Patient's pain is being managed. Although patient meets the criteria for acute rehabilitation patient is considering other options, including home with family assistance.  aware.   Will continue to follow and current recommendations may change if functional progress changes.  Recommend ongoing mobilization by staff to maintain cardiopulmonary function and prevention of secondary complications related to debility. Discussed with rehab team.

## 2022-11-03 NOTE — PROGRESS NOTE ADULT - CRITICAL CARE ATTENDING COMMENT
I have seen and examined this patient with the SICU team.  No acute events overnight.  Patient's pain is improving with the increased Gabapentin dose.  Her burning pain and numbness has improved and moved to only two fingers.  She is HDS and in NSR.  She started to desaturate overnight, and was placed on 2L NC.  She is saturating 96% on 2L NC.  She is tolerating diet, voiding, BGs are WNL, and she can be downgraded to the surgical floor tomorrow after 72 hours in the ICU for MAP checks >80.
I have seen and examined the patient with the SICU team.  No acute events overnight.  Patient is awake and alert in bed.  Her pain and burning are improved.  She is HDS and saturating 96% on 2L NC.  She is on a regular diet and tolerating.  She is stable for downgrade to the surgical floor today.
I have seen and examined the patient with the SICU team.  No acute events overnight.  Patient was downgraded yesterday and is awaiting a bed.  She is improving in terms of pain and neuro deficits in her left hand.  Her complaints of dizziness and "fogginess" are improved with a decreased gabapentin dosing.  She remains HDS and saturating well on room air.  She can be moved to the surgical floor.

## 2022-11-03 NOTE — DISCHARGE NOTE PROVIDER - NSDCCPCAREPLAN_GEN_ALL_CORE_FT
PRINCIPAL DISCHARGE DIAGNOSIS  Diagnosis: Hyperextension injury of cervical spine  Assessment and Plan of Treatment: Follow up with Neurosurgery upon discharge. Call for appointment. Cervical Collar at all times. Patient does not require follow up in the Acute Care Surgery clinic. Return to ED if worsening neurological symptoms, failure to progress, diet intolerance. Please follow up with your primary care physician regarding your hospitalization.   Lip Laceration- sutures will absorb on own. PCP can monitor moving forward should there be any concern or issue. In event you have questions or concerns you may also contact the acute care surgery/trauma clinic at [phone].       Hx of CAD s/p stents (LAD/D1 5817-1879; 2 stent 6/2017 at [place]) on ASA/brillinta.  - continue DAPT  - c/w atorvastatin  - c/w carvedilol Hx of CAD s/p stents (LAD/D1 7856-0420; 2 stent 6/2017 at [place]) on ASA/brillinta.  - continue DAPT  - c/w atorvastatin  - c/w carvedilol Hx of CAD s/p stents (LAD/D1 0405-2117; 2 stent 6/2017 at [place]) on ASA/brillinta.  - continue DAPT  - c/w atorvastatin  - c/w carvedilol

## 2022-11-03 NOTE — CONSULT NOTE ADULT - ASSESSMENT
ASSESSMENT  62F no significant PMH [place] [facility] [place] +HS, -LOC, AC/AP use, MRI pending official read, presented with likely central cord phenomenon.     PLAN  - plan pending further discussion with attending this morning  - MRI C spine complete, pending official read, will f/u  - no acute neurosurgical intervention indicated at this time  - continue C collar at all times  - pain control as needed, avoid over sedation  - further medical care per primary team  - will continue to follow
61 yo F   LF c/b cervical spine injury  now with neuropathic pain in BUE and posterior scalp    Med Recs:  gabapentin 300 milliGRAM(s) Oral three times a day  influenza   Vaccine 0.5 milliLiter(s) IntraMuscular once  lamoTRIgine 25 milliGRAM(s) Oral every 12 hours  methocarbamol 750 milliGRAM(s) Oral every 8 hours  DC oxyCODONE    PO  start tramadol 25/50 q4h PRN mod/sev pain    if patient remains in pain tomorrow, but without sedation:  DC lamotrigine  increase gabapentin to 600 tid

## 2022-11-03 NOTE — DISCHARGE NOTE PROVIDER - NSDCMRMEDTOKEN_GEN_ALL_CORE_FT
acetaminophen 325 mg oral tablet: 3 tab(s) orally every 8 hours, As needed, Mild Pain (1 - 3)  bacitracin 500 units/g topical ointment: 1 application topically 2 times a day  bisacodyl 10 mg rectal suppository: 1 suppository(ies) rectal once  polyethylene glycol 3350 oral powder for reconstitution: 17 gram(s) orally every 12 hours, As needed, Constipation  senna leaf extract oral tablet: 2 tab(s) orally once a day (at bedtime)   acetaminophen 325 mg oral tablet: 3 tab(s) orally every 8 hours, As needed, Mild Pain (1 - 3)  bacitracin 500 units/g topical ointment: 1 application topically 2 times a day  bisacodyl 10 mg rectal suppository: 1 suppository(ies) rectal once  gabapentin 300 mg oral capsule: 1 cap(s) orally 3 times a day  ketorolac: 15 milligram(s) intravenous every 8 hours as needed for moderate pain  methocarbamol 750 mg oral tablet: 1 tab(s) orally every 8 hours  polyethylene glycol 3350 oral powder for reconstitution: 17 gram(s) orally every 12 hours, As needed, Constipation  senna leaf extract oral tablet: 2 tab(s) orally once a day (at bedtime)  traMADol 50 mg oral tablet: 0.5 tab(s) orally every 4 hours, As needed, Moderate Pain (4 - 6)  traMADol 50 mg oral tablet: 1 tab(s) orally every 4 hours, As needed, Severe Pain (7 - 10)

## 2022-11-03 NOTE — CHART NOTE - NSCHARTNOTEFT_GEN_A_CORE
CCU/ICU Transfer Note    Transfer from: ICU  Transfer to:  (  ) Medicine    (  ) Telemetry    (  ) RCU    (  ) Palliative    (  ) Stroke Unit    ( x ) Trauma      CCU/ICU COURSE: 61 yo F who presented to ED s/p mechanical GLF while in her driveway. Patient hit her face on the ground. She presented to [facility] [place] with complaints of bilateral numbness/tingling in hands, as well as RLE numbness/weakness (she does endorse previous radiculopathy s/p melanoma excision). CT head/max/face/ A/P negative for any acute traumatic injuries. CT C-spine demonstrates C4/C5 spinal stenosis. MRI on 10/30 showed cord compression at C3/C4 and C4/C5 without cord signal abnormality, consistent with central cord phenomenon.   Pt admitted to SICU for q1 neuro checks and MAP goals>80 to improve spinal cord perfusion. While in SICU, weakness has improved as have para- and hyperesthesias of upper extremities. Complaining only of intermittent headache.      ASSESSMENT & PLAN:   62F presenting s/p GLF with findings concerning for central cord phenomenon. Symptoms are improving, patient has been stable hemodynamically and has improvement in neuro symptoms.    -C collar at all times  -continue IVF until appropriate PO intake  -PRN pain control  -gabapentin/lamictal for para/hyperesthesias  -continue to monitor  -labs as needed

## 2022-11-03 NOTE — DISCHARGE NOTE PROVIDER - CARE PROVIDER_API CALL
Sivakumar Mays)  Neurosurgery  [address], [address]  [place] [address]  Phone: [phone]  Fax: [phone]  Follow Up Time:

## 2022-11-03 NOTE — PROGRESS NOTE ADULT - SUBJECTIVE AND OBJECTIVE BOX
Acute Care Surgery / Trauma Surgery   [facility] [place]  ===============================  Interval/Overnight Events:  No acute overnight events. Pt reports continued improvement of her hand paresthesias. Tolerating diet. Voiding appropriately.    VITAL SIGNS:  T(C): 36.8 (11-02-22 @ 19:46), Max: 36.8 (11-02-22 @ 04:30)  HR: 87 (11-02-22 @ 23:00) (59 - 87)  BP: 107/56 (11-02-22 @ 23:00) (78/47 - 153/77)  ABP: --  ABP(mean): --  RR: 17 (11-02-22 @ 23:00) (10 - 27)  SpO2: 90% (11-02-22 @ 23:00) (90% - 99%)  CVP(mm Hg): --    NEUROLOGY:  Neurologic Medications:  acetaminophen     Tablet .. 975 milliGRAM(s) Oral every 8 hours PRN  gabapentin 400 milliGRAM(s) Oral every 8 hours  lamoTRIgine 25 milliGRAM(s) Oral every 12 hours  methocarbamol 750 milliGRAM(s) Oral every 8 hours  oxyCODONE    IR 5 milliGRAM(s) Oral every 4 hours PRN  oxyCODONE    IR 10 milliGRAM(s) Oral every 4 hours PRN    Exam:  RASS : 0   GCS: 15    CAM ICU: negative  Exam: AAOx3, strength 5/5 b/l extremities, sensation intact, hand grip3/5   sensation grossly intact to touch all extremities, increased sensation b/l hands among the lateral aspect  [x ] Adequacy of sedation and pain control has been assessed and adjusted  Comments:    RESPIRATORY: Lung fields clear to auscultation bilaterally. No increased work of breathing.  Respiratory Medications:    Exam:   Mechanical Ventilation:       [ ] Extubation Readiness Assessed    CARDIOVASCULAR  Cardiovascular Medications:    Exam: RRR    Cardiac Rhythm:	NSR  ECHO:  Comments:    Metabolic/FLUIDS/ELECTROLYTES/NUTRITION:  Gastrointestinal Medications:  polyethylene glycol 3350 17 Gram(s) Oral every 12 hours PRN  senna 2 Tablet(s) Oral at bedtime    I&O's Detail  62y  Daily   11-02    139  |  102  |  10.6  ----------------------------<  108<H>  4.3   |  29.0  |  0.65    Ca    8.6      02 Nov 2022 07:00  Phos  3.5     11-02  Mg     1.9     11-02      Exam: soft, nontender, nondistended  Diet: Regular  Comments:    HEMATOLOGIC:  Hematologic/Oncologic Medications:  enoxaparin Injectable 40 milliGRAM(s) SubCutaneous every 24 hours    [x ] DVT Prophylaxis:                                              12.7                  Neurophils% (auto):   44.0   (11-02 @ 03:50):    5.80 )-----------(196          Lymphocytes% (auto):  43.4                                          38.8                   Eosinphils% (auto):   2.9      Manual%: Neutrophils x    ; Lymphocytes x    ; Eosinophils x    ; Bands%: x    ; Blasts x            Transfusions:	[ ] PRBC	[ ] Platelets	[ ] FFP		[ ] Cryoprecipitate    Comments:    INFECTIOUS DISEASE:  Antimicrobials/Immunologic Medications:  influenza   Vaccine 0.5 milliLiter(s) IntraMuscular once    RECENT CULTURES:        PATIENT CARE ACCESS DEVICES:  [ ] Peripheral IV  [ ] Central Venous Line	[ ] R	[ ] L	[ ] IJ	[ ] Fem	[ ] SC	Placed:   [ ] Arterial Line		[ ] R	[ ] L	[ ] Fem	[ ] Rad	[ ] Ax	Placed:   [ ] PICC:					[ ] Mediport  [ ] Urinary Catheter, Date Placed:   [ ] Necessity of urinary, arterial, and venous catheters discussed    OTHER MEDICATIONS:  Endocrine/Metabolic Medications:    Genitourinary Medications:    Topical/Other Medications:  BACItracin   Ointment 1 Application(s) Topical two times a day      IMAGING STUDIES:

## 2022-11-03 NOTE — PROGRESS NOTE ADULT - ASSESSMENT
62y Female s/p fall with acute traumatic central cord syndrome    Neuro:   Consistently AAOx3, neurologic status stable, neurosurg following  C/w c collar, toradol and tylenol prn, robaxin q8h, gabapentin TID, and lamictal BID. Limit narcotics as much as able given delirium. Wean radiculopathy regimen given improvement.   For pain management and PT/OT consults    CV:  HD stable, automapping >80 for >72h, int decreases in MAP 2/2 narcotics w spontaneous resolution, continue to monitor vitals q4h    Pulm: on RA    GI/Nutrition:  c/w regular diet, c/w bowel regimen    /Renal: voiding spontaneously, c/w PO hydration    ID: persistently afebrile without leukocytosis    Heme: H/H stable    Dvt ppx: SCDS, lovenox, ambulation as tolerated    Endo: euglycemic    Skin:  c/w bacitracin for facial abrasions    Dispo: floor

## 2022-11-03 NOTE — CONSULT NOTE ADULT - ATTENDING COMMENTS
Spoke and spouse about options.   AR would be of benefit but is considering home as well. SW aware.   Pain management appreciated.  Recommend DC Lamictal today. Also recommend DC Robaxin.   Optimize Neurontin. Will likely have more fatigue than lethargy.   Patient seen and examined, discussed patient with Dr. Meza and agree with recommendations.

## 2022-11-03 NOTE — CONSULT NOTE ADULT - SUBJECTIVE AND OBJECTIVE BOX
CC: neck and arm pain    HPI: per chart review:  63 yo F who presented to ED s/p mechanical GLF while in her driveway. Patient hit her face on the ground, landing face forward on her arm. Patient denies any LOC. + Headstrike. Not on ASA/AC. She presented to [facility] [place] with complaints of bilateral numbness/tingling in hands, as well as RLE numbness/weakness (she does endorse previous  radiculopathy s/p melanoma excision). CT head/max/face/ A/P negative for any acute traumatic injuries. CT C-spine demonstrates C4/C5 spinal stenosis.   Patient admitted to the acute care surgery/trauma service/SICU with symptoms consistent with central cord syndrome. Neurosurgery consulted and recommended C collar at all times as well as MRI which was performed. Patient had MAP maintained > 85 fo cord perfusion. Patient had some improvement with symptoms. Patient requested second opinion by way of Neurosrugical approach which was requested on patients behalf on 11/3/22. Patient was stable for transition to floor.     .seen     Analgesic Dosing for past 24 hours reviewed as below:  acetaminophen     Tablet ..   975 milliGRAM(s) Oral (11-02-22 @ 17:50)    acetaminophen   IVPB ..   400 mL/Hr IV Intermittent (11-03-22 @ 09:15)    gabapentin   400 milliGRAM(s) Oral (11-03-22 @ 05:35)   400 milliGRAM(s) Oral (11-02-22 @ 21:27)   400 milliGRAM(s) Oral (11-02-22 @ 14:36)    ketorolac   Injectable   15 milliGRAM(s) IV Push (11-02-22 @ 12:21)    lamoTRIgine   25 milliGRAM(s) Oral (11-03-22 @ 05:35)   25 milliGRAM(s) Oral (11-02-22 @ 17:51)    methocarbamol   750 milliGRAM(s) Oral (11-03-22 @ 05:35)   750 milliGRAM(s) Oral (11-02-22 @ 21:28)   750 milliGRAM(s) Oral (11-02-22 @ 12:22)    oxyCODONE    IR   5 milliGRAM(s) Oral (11-03-22 @ 11:19)   5 milliGRAM(s) Oral (11-03-22 @ 05:38)   5 milliGRAM(s) Oral (11-02-22 @ 22:10)   5 milliGRAM(s) Oral (11-02-22 @ 17:50)          T(C): 36.6 (11-03-22 @ 07:15), Max: 36.8 (11-02-22 @ 19:46)  HR: 65 (11-03-22 @ 11:00) (55 - 87)  BP: 110/72 (11-03-22 @ 11:00) (83/49 - 153/77)  RR: 15 (11-03-22 @ 11:00) (12 - 27)  SpO2: 100% (11-03-22 @ 11:00) (88% - 100%)      11-02-22 @ 07:01  -  11-03-22 @ 07:00  --------------------------------------------------------  IN: 1330 mL / OUT: 3050 mL / NET: -1720 mL    11-03-22 @ 07:01  -  11-03-22 @ 11:56  --------------------------------------------------------  IN: 0 mL / OUT: 300 mL / NET: -300 mL        acetaminophen     Tablet .. 975 milliGRAM(s) Oral every 8 hours PRN  BACItracin   Ointment 1 Application(s) Topical two times a day  bisacodyl Suppository 10 milliGRAM(s) Rectal once  enoxaparin Injectable 40 milliGRAM(s) SubCutaneous every 24 hours  gabapentin 300 milliGRAM(s) Oral three times a day  influenza   Vaccine 0.5 milliLiter(s) IntraMuscular once  lamoTRIgine 25 milliGRAM(s) Oral every 12 hours  methocarbamol 750 milliGRAM(s) Oral every 8 hours  oxyCODONE    IR 5 milliGRAM(s) Oral every 4 hours PRN  oxyCODONE    IR 10 milliGRAM(s) Oral every 4 hours PRN  polyethylene glycol 3350 17 Gram(s) Oral every 12 hours PRN  senna 2 Tablet(s) Oral at bedtime                          12.7   5.80  )-----------( 196      ( 02 Nov 2022 03:50 )             38.8     11-02    139  |  102  |  10.6  ----------------------------<  108<H>  4.3   |  29.0  |  0.65    Ca    8.6      02 Nov 2022 07:00  Phos  3.5     11-02  Mg     1.9     11-02            Pain Service   [phone] CC: neck and arm pain    HPI: per chart review:  61 yo F who presented to ED s/p mechanical GLF while in her driveway. Patient hit her face on the ground, landing face forward on her arm. Patient denies any LOC. + Headstrike. Not on ASA/AC. [facility] with complaints of bilateral numbness/tingling in hands, as well as RLE numbness/weakness (she does endorse previous  radiculopathy s/p melanoma excision). CT head/max/face/ A/P negative for any acute traumatic injuries. CT C-spine demonstrates C4/C5 spinal stenosis.   Patient admitted to the acute care surgery/trauma service/SICU with symptoms consistent with central cord syndrome. Neurosurgery consulted and recommended C collar at all times as well as MRI which was performed. Patient had MAP maintained > 85 fo cord perfusion. Patient had some improvement with symptoms. Patient requested second opinion by way of Neurosrugical approach which was requested on patients behalf on 11/3/22. Patient was stable for transition to floor.     Interval Hx:  Patient seen during rounds  Patient reports pain to be mod controlled on current medications  Patient denies sedation with medications   pain in b/l hand + posterior scalp  describes burning/electrical/shooting quality to the pain   pain improving with current regimen  experienced sedation yesterday now resolved with decreased dosing      Analgesic Dosing for past 24 hours reviewed as below:  acetaminophen     Tablet ..   975 milliGRAM(s) Oral (11-02-22 @ 17:50)    acetaminophen   IVPB ..   400 mL/Hr IV Intermittent (11-03-22 @ 09:15)    gabapentin   400 milliGRAM(s) Oral (11-03-22 @ 05:35)   400 milliGRAM(s) Oral (11-02-22 @ 21:27)   400 milliGRAM(s) Oral (11-02-22 @ 14:36)    ketorolac   Injectable   15 milliGRAM(s) IV Push (11-02-22 @ 12:21)    lamoTRIgine   25 milliGRAM(s) Oral (11-03-22 @ 05:35)   25 milliGRAM(s) Oral (11-02-22 @ 17:51)    methocarbamol   750 milliGRAM(s) Oral (11-03-22 @ 05:35)   750 milliGRAM(s) Oral (11-02-22 @ 21:28)   750 milliGRAM(s) Oral (11-02-22 @ 12:22)    oxyCODONE    IR   5 milliGRAM(s) Oral (11-03-22 @ 11:19)   5 milliGRAM(s) Oral (11-03-22 @ 05:38)   5 milliGRAM(s) Oral (11-02-22 @ 22:10)   5 milliGRAM(s) Oral (11-02-22 @ 17:50)          T(C): 36.6 (11-03-22 @ 07:15), Max: 36.8 (11-02-22 @ 19:46)  HR: 65 (11-03-22 @ 11:00) (55 - 87)  BP: 110/72 (11-03-22 @ 11:00) (83/49 - 153/77)  RR: 15 (11-03-22 @ 11:00) (12 - 27)  SpO2: 100% (11-03-22 @ 11:00) (88% - 100%)      11-02-22 @ 07:01  -  11-03-22 @ 07:00  --------------------------------------------------------  IN: 1330 mL / OUT: 3050 mL / NET: -1720 mL    11-03-22 @ 07:01  -  11-03-22 @ 11:56  --------------------------------------------------------  IN: 0 mL / OUT: 300 mL / NET: -300 mL        acetaminophen     Tablet .. 975 milliGRAM(s) Oral every 8 hours PRN  BACItracin   Ointment 1 Application(s) Topical two times a day  bisacodyl Suppository 10 milliGRAM(s) Rectal once  enoxaparin Injectable 40 milliGRAM(s) SubCutaneous every 24 hours  gabapentin 300 milliGRAM(s) Oral three times a day  influenza   Vaccine 0.5 milliLiter(s) IntraMuscular once  lamoTRIgine 25 milliGRAM(s) Oral every 12 hours  methocarbamol 750 milliGRAM(s) Oral every 8 hours  oxyCODONE    IR 5 milliGRAM(s) Oral every 4 hours PRN  oxyCODONE    IR 10 milliGRAM(s) Oral every 4 hours PRN  polyethylene glycol 3350 17 Gram(s) Oral every 12 hours PRN  senna 2 Tablet(s) Oral at bedtime                          12.7   5.80  )-----------( 196      ( 02 Nov 2022 03:50 )             38.8     11-02    139  |  102  |  10.6  ----------------------------<  108<H>  4.3   |  29.0  |  0.65    Ca    8.6      02 Nov 2022 07:00  Phos  3.5     11-02  Mg     1.9     11-02            Pain Service   [phone]

## 2022-11-03 NOTE — DISCHARGE NOTE PROVIDER - HOSPITAL COURSE
HPI: Ms. Scott is a 63 yo F who presented to ED s/p mechanical GLF while in her driveway. Patient hit her face on the ground, landing face forward on her arm. Patient denies any LOC. + Headstrike. Not on ASA/AC. She [place] [facility] [place] with complaints of bilateral numbness/tingling in hands, as well as RLE numbness/weakness (she does endorse previous  radiculopathy s/p melanoma excision). CT head/max/face/ A/P negative for any acute traumatic injuries. CT C-spine demonstrates C4/C5 spinal stenosis. Patient was transferred to [facility] for MRI, and further evaluation. On presentation, patient primary intact, GCS 15. HDS. She is noted to have a small mucosal lip laceration s/p repair by EDm sensory defect in bilateral hands. Otherwise, intact. MRI completed, pending final read. NSGY consulted for evaluation.     Hospital Course:     HPI: Ms. Scott is a 61 yo F who presented to ED s/p mechanical GLF while in her driveway. Patient hit her face on the ground, landing face forward on her arm. Patient denies any LOC. + Headstrike. Not on ASA/AC. She presented to [facility] [place] with complaints of bilateral numbness/tingling in hands, as well as RLE numbness/weakness (she does endorse previous  radiculopathy s/p melanoma excision). CT head/max/face/ A/P negative for any acute traumatic injuries. CT C-spine demonstrates C4/C5 spinal stenosis. Patient was transferred to [facility] for MRI, and further evaluation. On presentation, patient primary intact, GCS 15. HDS. She is noted to have a small mucosal lip laceration s/p repair by EDm sensory defect in bilateral hands. Otherwise, intact. MRI completed, pending final read. NSGY consulted for evaluation.     Hospital Course:  Patient admitted to the acute care surgery/trauma service/SICU with symptoms consistent with central cord syndrome. Neurosurgery consulted and recommended C collar at all times as well as MRI which was performed. Patient had MAP maintained > 85 fo cord perfusion. Patient had some improvement with symptoms. Patient requested second opinion by way of Neurosrugical approach which was requested on patients behalf on 11/3/22. Patient was stable for transition to floor. Rehab services continued to work with patient and recommendation upon discharge for disposition to ................    At time of discharge patient was able to appropriately perform expected activities of daily living, diet tolerated and pain controlled.     Length of time preparing discharge > 30 minutes   HPI: Ms. Scott is a 61 yo F who presented to ED s/p mechanical GLF while in her driveway. Patient hit her face on the ground, landing face forward on her arm. Patient denies any LOC. + Headstrike. Not on ASA/AC. She presented to [facility] [place] with complaints of bilateral numbness/tingling in hands, as well as RLE numbness/weakness (she does endorse previous  radiculopathy s/p melanoma excision). CT head/max/face/ A/P negative for any acute traumatic injuries. CT C-spine demonstrates C4/C5 spinal stenosis. Patient was transferred to [facility] for MRI, and further evaluation. On presentation, patient primary intact, GCS 15. HDS. She is noted to have a small mucosal lip laceration s/p repair by EDm sensory defect in bilateral hands. Otherwise, intact. MRI completed, pending final read. NSGY consulted for evaluation.     Hospital Course:  Patient admitted to the acute care surgery/trauma service/SICU with symptoms consistent with central cord syndrome. Neurosurgery consulted and recommended C collar at all times as well as MRI which was performed. Patient had MAP maintained > 85 fo cord perfusion. Patient had some improvement with symptoms. Patient requested second opinion by way of Neurosurgical approach which was requested on patients behalf on 11/3/22. Patient was stable for transition to floor. Rehab services continued to work with patient and recommendation upon discharge for disposition to Acute rehab.     At time of discharge patient was able to appropriately perform expected activities of daily living, diet tolerated and pain controlled.     Length of time preparing discharge > 30 minutes

## 2022-11-03 NOTE — PROGRESS NOTE ADULT - ASSESSMENT
62F no significant PMH presented as a transfer from [facility] [place] s/p mechanical fall +HS, -LOC, AC/AP use, presented with central cord syndrome.       Plan  - May DG to Q2 neuro checks  - No acute neurosurgical intervention indicated at this time  - Continue Cervical collar at all times. May remove when showering after DC   - Pain control as needed, avoid over sedation. Pain management consulted for symptom relief.   - Continued PT/OT  - Stressed importance of PT with hand exercises to prevent muscle atrophy.   - After in-depth discussion w/ patient and  bedside regarding central cord syndrome and plan to continue conservative treatment 2/2 improvement in symptoms. Discussed possibility of neurosurgical intervention w/ either anterior or posterior cervical decompression and fusion IF any worsening of symptoms- including gait difficulties, fine motor function such as buttoning shirt, dropping items, etc.   - Medical management/supportive care per primary team   - D/w Dr. Ornelas

## 2022-11-03 NOTE — PROGRESS NOTE ADULT - SUBJECTIVE AND OBJECTIVE BOX
INTERVAL HPI/OVERNIGHT EVENTS:  62 year old Female w/ PMHX presented as a transfer from [facility] [place] s/p mechanical fall +HS, -LOC, AC/AP use, presented with central cord syndrome.   Patient seen and examined with Dr. Ornelas and neurosurgery team. Patient sitting in chair, states she feels better today and the hand pain has decreased pain/improvement w/ hyperesthesia in 2/3rd lateral aspect digit. Pain management seen today and importance to work with physical therapy with hand exercises to prevent muscle atrophy. After in-depth discussion w/ patient and  bedside regarding central cord syndrome and plan to continue conservative treatment 2/2 improvement in symptoms. Discussed possibility of neurosurgical intervention w/ either anterior or posterior cervical decompression and fusion IF any worsening of symptoms- including gait difficulties, fine motor function such as buttoning shirt, dropping items, etc. Patient understands and agrees to continue collar.     Vital Signs Last 24 Hrs  T(C): 36.6 (03 Nov 2022 07:15), Max: 36.8 (02 Nov 2022 19:46)  T(F): 97.9 (03 Nov 2022 07:15), Max: 98.3 (02 Nov 2022 19:46)  HR: 67 (03 Nov 2022 13:00) (55 - 87)  BP: 99/54 (03 Nov 2022 13:00) (83/49 - 153/77)  BP(mean): 67 (03 Nov 2022 13:00) (59 - 102)  RR: 16 (03 Nov 2022 13:00) (12 - 27)  SpO2: 93% (03 Nov 2022 13:00) (88% - 100%)  Parameters below as of 03 Nov 2022 12:00  Patient On (Oxygen Delivery Method): room air    PHYSICAL EXAM:  GENERAL: NAD, well-groomed, overweight  HEAD: Facial lacs, dried blood noted. normocephalic  NECK: Cervical collar on  MENTAL STATUS: AAO x3; Awake. Opens eyes spontaneously. Appropriately conversant without aphasia, following simple commands.  CRANIAL NERVES: Visual acuity normal for age, PERRL. EOMI without nystagmus. Facial sensation intact V1-3 distribution b/l. Face symmetric w/ normal eye closure and smile, tongue midline. Hearing grossly intact. Speech clear.   MOTOR: strength 5/5 b/l upper and lower extremities aside from b/l hand  3/5 limited 2/2 painful paresthesias  SENSATION: grossly intact to light touch all extremities, hyperesthesias b/l hands among the dorsal aspect and improvement of pain in 2/3rd digit   REFLEXES: No clonus b/l EL  RESP: Nonlabored breaths    LABS:                      12.7   5.80  )-----------( 196      ( 02 Nov 2022 03:50 )             38.8     11-02    139  |  102  |  10.6  ----------------------------<  108<H>  4.3   |  29.0  |  0.65    Ca    8.6      02 Nov 2022 07:00  Phos  3.5     11-02  Mg     1.9     11-02 11-02 @ 07:01  -  11-03 @ 07:00  --------------------------------------------------------  IN: 1330 mL / OUT: 3050 mL / NET: -1720 mL    11-03 @ 07:01  -  11-03 @ 13:19  --------------------------------------------------------  IN: 0 mL / OUT: 300 mL / NET: -300 mL      RADIOLOGY & ADDITIONAL STUDIES:  MR Cervical Spine No Cont (10.30.22 @ 03:56)  IMPRESSION:  Cord compression at C3/C4 and C4/C5 levels without cord signal   abnormality on T2 sagittal.  Likely Modic endplate degenerative signal   changes at C4/C5 level. If there is clinical concern for early   discitis/osteomyelitis consider follow-up postcontrast images.  This case was discussed with Dr. Ramirez at 5:25 a.m. central standard   time on 10/30/2022 by Dr. TOREY MIMS.  Preliminary report provided by [facility] TOREY MIMS.

## 2022-11-04 LAB — SARS-COV-2 RNA SPEC QL NAA+PROBE: SIGNIFICANT CHANGE UP

## 2022-11-04 PROCEDURE — 99233 SBSQ HOSP IP/OBS HIGH 50: CPT

## 2022-11-04 RX ORDER — TRAMADOL HYDROCHLORIDE 50 MG/1
1 TABLET ORAL
Qty: 0 | Refills: 0 | DISCHARGE
Start: 2022-11-04

## 2022-11-04 RX ORDER — TRAMADOL HYDROCHLORIDE 50 MG/1
0.5 TABLET ORAL
Qty: 0 | Refills: 0 | DISCHARGE
Start: 2022-11-04

## 2022-11-04 RX ORDER — METHOCARBAMOL 500 MG/1
1 TABLET, FILM COATED ORAL
Qty: 0 | Refills: 0 | DISCHARGE
Start: 2022-11-04

## 2022-11-04 RX ORDER — KETOROLAC TROMETHAMINE 30 MG/ML
15 SYRINGE (ML) INJECTION
Qty: 0 | Refills: 0 | DISCHARGE
Start: 2022-11-04

## 2022-11-04 RX ORDER — GABAPENTIN 400 MG/1
1 CAPSULE ORAL
Qty: 0 | Refills: 0 | DISCHARGE
Start: 2022-11-04

## 2022-11-04 RX ADMIN — Medication 15 MILLIGRAM(S): at 21:44

## 2022-11-04 RX ADMIN — METHOCARBAMOL 750 MILLIGRAM(S): 500 TABLET, FILM COATED ORAL at 13:42

## 2022-11-04 RX ADMIN — Medication 15 MILLIGRAM(S): at 13:41

## 2022-11-04 RX ADMIN — METHOCARBAMOL 750 MILLIGRAM(S): 500 TABLET, FILM COATED ORAL at 21:40

## 2022-11-04 RX ADMIN — Medication 15 MILLIGRAM(S): at 05:04

## 2022-11-04 RX ADMIN — Medication 15 MILLIGRAM(S): at 21:40

## 2022-11-04 RX ADMIN — Medication 15 MILLIGRAM(S): at 05:07

## 2022-11-04 RX ADMIN — Medication 400 MILLIGRAM(S): at 00:50

## 2022-11-04 RX ADMIN — Medication 1 APPLICATION(S): at 17:02

## 2022-11-04 RX ADMIN — GABAPENTIN 300 MILLIGRAM(S): 400 CAPSULE ORAL at 21:41

## 2022-11-04 RX ADMIN — LAMOTRIGINE 25 MILLIGRAM(S): 25 TABLET, ORALLY DISINTEGRATING ORAL at 05:03

## 2022-11-04 RX ADMIN — Medication 1000 MILLIGRAM(S): at 09:30

## 2022-11-04 RX ADMIN — METHOCARBAMOL 750 MILLIGRAM(S): 500 TABLET, FILM COATED ORAL at 05:03

## 2022-11-04 RX ADMIN — TRAMADOL HYDROCHLORIDE 50 MILLIGRAM(S): 50 TABLET ORAL at 17:32

## 2022-11-04 RX ADMIN — Medication 400 MILLIGRAM(S): at 08:58

## 2022-11-04 RX ADMIN — GABAPENTIN 300 MILLIGRAM(S): 400 CAPSULE ORAL at 13:42

## 2022-11-04 RX ADMIN — Medication 1 APPLICATION(S): at 05:03

## 2022-11-04 RX ADMIN — Medication 1000 MILLIGRAM(S): at 00:52

## 2022-11-04 RX ADMIN — ENOXAPARIN SODIUM 40 MILLIGRAM(S): 100 INJECTION SUBCUTANEOUS at 21:41

## 2022-11-04 RX ADMIN — TRAMADOL HYDROCHLORIDE 50 MILLIGRAM(S): 50 TABLET ORAL at 17:02

## 2022-11-04 RX ADMIN — GABAPENTIN 300 MILLIGRAM(S): 400 CAPSULE ORAL at 05:03

## 2022-11-04 NOTE — PROGRESS NOTE ADULT - NS ATTEND AMEND GEN_ALL_CORE FT
Neurosurgery Attending Attestation:    Patient seen and examined at bedside. Agree with plan and note as documented above.    61 y/o F with no significant PMHx [place] a [place] [facility] [place] suffering a mechanical fall and headstrike with subsequent central cord syndrome. MRI C-spine w/o contrast demonstrates disc herniations at C3/4 and C4/5 abutting the spinal cord without evidence of cord signal change. Patient has been nonoperatively managed with [place] J at all times with now improving hypesthesia and hyperalgesia. On exam, patient is 5/5 bl D/B/T and 4+ to 5/5 in WF/WE//IO, 5/5 in BLE, no Cuenca's, no clonus. Patient would benefit from [place] J at all times except when showering, gabapentin, pain control, mobilization, PT/OT and dispo planning.    -Little Ornelas MD
agree
agree
Neurosurgery Attending Attestation:    Patient seen and examined at bedside. Agree with plan and note as documented above.    63 y/o F with no significant PMHx [place] [facility] [place] and headstrike with subsequent central cord syndrome. MRI C-spine w/o contrast demonstrates disc herniations at C3/4 and C4/5 abutting the spinal cord without evidence of cord signal change. On exam, patient is 5/5 bl D/B/T and 4+ to 5/5 in WF/WE//IO, 5/5 in BLE, no Cuenca's, no clonus and improving hypesthesias and hyperalgesia. Continue [place] J at all times except when showering, gabapentin, pain control, mobilization, PT/OT and dispo planning. Plan for outpatient follow up with me in 4-6 weeks.    -Little Orenlas MD.
I explained to the pt and her family the management plan.

## 2022-11-04 NOTE — PROGRESS NOTE ADULT - SUBJECTIVE AND OBJECTIVE BOX
Patient sitting up and feels her hands are somewhat better (some aspects of movement vs pain).  Patient now more open to AR, but would like to know costs.   Discussed best plan would be AR to outpatient, given OT needs and ability to have more options for treatment of her neuropathic/neurological deficits.     REVIEW OF SYSTEMS  Constitutional - No fever,  No fatigue  HEENT - No vertigo, +neck pain  Neurological - No headaches, No memory loss, +loss of strength, +numbness, No tremors  Musculoskeletal - +joint pain, +joint swelling, +muscle pain    FUNCTIONAL PROGRESS  11/3 PT  Sit-Stand Transfer Training  Transfer Training Sit-to-Stand Transfer: minimum assist (75% patient effort);  1 person assist;  weight-bearing as tolerated   rolling walker  Transfer Training Stand-to-Sit Transfer: minimum assist (75% patient effort);  1 person assist;  weight-bearing as tolerated   rolling walker  Sit-to-Stand Transfer Training Transfer Safety Analysis: decreased balance;  impaired balance;  decreased strength;  impaired postural control;  c/o dizziness upon standing ;  rolling walker    Gait Training  Gait Training: minimum assist (75% patient effort);  1 person + 1 person to manage equipment;  (+) chair follow ;  weight-bearing as tolerated   rolling walker;  3 feet   Gait Analysis: 2-point gait   decreased arturo;  shuffling;  ataxic;  decreased step length;  impaired balance;  decreased strength;  impaired postural control;  impaired coordination;  3 feet, pt limited by c/o feeling "the room is spinning" Pt requested to reattempt gait however, only able to take 1 step on 2nd attempt. ;  rolling walker        VITALS  T(C): 36.4 (11-04-22 @ 04:29), Max: 36.9 (11-03-22 @ 16:35)  HR: 72 (11-04-22 @ 04:29) (65 - 85)  BP: 110/67 (11-04-22 @ 04:29) (99/54 - 126/56)  RR: 18 (11-04-22 @ 04:29) (13 - 19)  SpO2: 93% (11-04-22 @ 04:29) (92% - 100%)  Wt(kg): --    MEDICATIONS   BACItracin   Ointment 1 Application(s) two times a day  enoxaparin Injectable 40 milliGRAM(s) every 24 hours  gabapentin 300 milliGRAM(s) three times a day  influenza   Vaccine 0.5 milliLiter(s) once  ketorolac   Injectable 15 milliGRAM(s) three times a day  methocarbamol 750 milliGRAM(s) every 8 hours  polyethylene glycol 3350 17 Gram(s) every 12 hours PRN  senna 2 Tablet(s) at bedtime  traMADol 25 milliGRAM(s) every 4 hours PRN  traMADol 50 milliGRAM(s) every 4 hours PRN      RECENT LABS/IMAGING      11-02    139  |  102  |  10.6  ----------------------------<  108<H>  4.3   |  29.0  |  0.65    Ca    8.6      02 Nov 2022 07:00  Phos  3.5     11-02  Mg     1.9     11-02    MRI C spine 10/30/22- Cord compression at C3/C4 and C4/C5 levels without cord signal abnormality on T2 sagittal.  Likely Modic endplate degenerative signal changes at C4/C5 level. If there is clinical concern for early discitis/osteomyelitis consider follow-up postcontrast images.  X ray R knee 10/30/22-No radiographic osseous pathology.            ----------------------------------------------------------------------------------------  PHYSICAL EXAM  Constitutional - NAD, Comfortable  HEENT - C-Collar, Facial abrasions - Healing  Extremities - Mild BUE hand edema   Neurologic Exam -                    Cognitive - AAOx4     Motor -                      LEFT    UE - C5 5/5, C6 4*/5, C7 5/5, C8 2/5, T1 2/5                    RIGHT UE - C5 5/5, C6 4*/5, C7 5/5, C8 2/5, T1 2/5                    LEFT    LE - L2 5/5, L3 5/5, L4 5/5, L5 5/5, S1 5/5                    RIGHT LE - L2 5/5, L3 5/5, L4 5/5, L5 5/5, S1 5/5     Sensory - Severe allodynia in BUE hands  Psychiatric - Mood stable, Affect WNL  ----------------------------------------------------------------------------------------  ASSESSMENT/PLAN  62yFemale with functional deficits after fall sustaining a traumatic SCI  Traumatic Central Cord Incomplete SCI -   Pulm - Incentive Spirometer  Neurogenic Bowel - Vinicio HS  Neurogenic Bladder - Monitor Bladder Scan/PVR  Pain - As per pain management, Recommend Neurontin optimization - suggest 400mg Q8  DVT PPX - SCDs, Lovenox  Rehab - Recommend ACUTE inpatient rehabilitation for the functional deficits consisting of 3 hours of therapy/day & 24 hour RN/daily PMR physician for comorbid medical management. Will continue to follow for ongoing rehab needs and recommendations. Patient will be able to tolerate 3 hours a day.    Continue bedside therapy as well as OOB throughout the day with mobilization throughout the day with staff to maintain cardiopulmonary function and prevention of secondary complications related to debility.     Discussed with rehab clinical team.     Patient sitting up and feels her hands are somewhat better (some aspects of movement vs pain).  Patient now more open to AR, but would like to know costs.   Discussed best plan would be AR to outpatient, given OT needs and ability to have more options for treatment of her neuropathic/neurological deficits.     REVIEW OF SYSTEMS  Constitutional - No fever,  No fatigue  HEENT - No vertigo, +neck pain  Neurological - No headaches, No memory loss, +loss of strength, +numbness, No tremors  Musculoskeletal - +joint pain, +joint swelling, +muscle pain    FUNCTIONAL PROGRESS  11/3 PT  Sit-Stand Transfer Training  Transfer Training Sit-to-Stand Transfer: minimum assist (75% patient effort);  1 person assist;  weight-bearing as tolerated   rolling walker  Transfer Training Stand-to-Sit Transfer: minimum assist (75% patient effort);  1 person assist;  weight-bearing as tolerated   rolling walker  Sit-to-Stand Transfer Training Transfer Safety Analysis: decreased balance;  impaired balance;  decreased strength;  impaired postural control;  c/o dizziness upon standing ;  rolling walker    Gait Training  Gait Training: minimum assist (75% patient effort);  1 person + 1 person to manage equipment;  (+) chair follow ;  weight-bearing as tolerated   rolling walker;  3 feet   Gait Analysis: 2-point gait   decreased arturo;  shuffling;  ataxic;  decreased step length;  impaired balance;  decreased strength;  impaired postural control;  impaired coordination;  3 feet, pt limited by c/o feeling "the room is spinning" Pt requested to reattempt gait however, only able to take 1 step on 2nd attempt. ;  rolling walker        VITALS  T(C): 36.4 (11-04-22 @ 04:29), Max: 36.9 (11-03-22 @ 16:35)  HR: 72 (11-04-22 @ 04:29) (65 - 85)  BP: 110/67 (11-04-22 @ 04:29) (99/54 - 126/56)  RR: 18 (11-04-22 @ 04:29) (13 - 19)  SpO2: 93% (11-04-22 @ 04:29) (92% - 100%)  Wt(kg): --    MEDICATIONS   BACItracin   Ointment 1 Application(s) two times a day  enoxaparin Injectable 40 milliGRAM(s) every 24 hours  gabapentin 300 milliGRAM(s) three times a day  influenza   Vaccine 0.5 milliLiter(s) once  ketorolac   Injectable 15 milliGRAM(s) three times a day  methocarbamol 750 milliGRAM(s) every 8 hours  polyethylene glycol 3350 17 Gram(s) every 12 hours PRN  senna 2 Tablet(s) at bedtime  traMADol 25 milliGRAM(s) every 4 hours PRN  traMADol 50 milliGRAM(s) every 4 hours PRN      RECENT LABS/IMAGING      11-02    139  |  102  |  10.6  ----------------------------<  108<H>  4.3   |  29.0  |  0.65    Ca    8.6      02 Nov 2022 07:00  Phos  3.5     11-02  Mg     1.9     11-02    MRI C spine 10/30/22- Cord compression at C3/C4 and C4/C5 levels without cord signal abnormality on T2 sagittal.  Likely Modic endplate degenerative signal changes at C4/C5 level. If there is clinical concern for early discitis/osteomyelitis consider follow-up postcontrast images.  X ray R knee 10/30/22-No radiographic osseous pathology.            ----------------------------------------------------------------------------------------  PHYSICAL EXAM  Constitutional - NAD, Comfortable  HEENT - C-Collar, Facial abrasions - Healing  Extremities - Mild BUE hand edema   Neurologic Exam -                    Cognitive - AAOx4     Motor -                      LEFT    UE - C5 5/5, C6 4*/5, C7 5/5, C8 2/5, T1 2/5                    RIGHT UE - C5 5/5, C6 4*/5, C7 5/5, C8 2/5, T1 2/5                    LEFT    LE - L2 5/5, L3 5/5, L4 5/5, L5 5/5, S1 5/5                    RIGHT LE - L2 5/5, L3 5/5, L4 5/5, L5 5/5, S1 5/5     Sensory - Severe allodynia in BUE hands  Psychiatric - Mood stable, Affect WNL  ----------------------------------------------------------------------------------------  ASSESSMENT/PLAN  62yFemale with functional deficits after fall sustaining a traumatic SCI  Traumatic Central Cord Incomplete SCI -   Pulm - Incentive Spirometer  Neurogenic Bowel - Vinicio HS  Neurogenic Bladder - Monitor Bladder Scan/PVR  Pain - As per pain management, Recommend Neurontin optimization - suggest 400mg Q8, Recommend ICE/HEAT  Desensitization of hand pain  DVT PPX - SCDs, Lovenox  Rehab - Recommend ACUTE inpatient rehabilitation for the functional deficits consisting of 3 hours of therapy/day & 24 hour RN/daily PMR physician for comorbid medical management. Will continue to follow for ongoing rehab needs and recommendations. Patient will be able to tolerate 3 hours a day.    Continue bedside therapy as well as OOB throughout the day with mobilization throughout the day with staff to maintain cardiopulmonary function and prevention of secondary complications related to debility.     Discussed with rehab clinical team.

## 2022-11-04 NOTE — PROGRESS NOTE ADULT - ATTENDING COMMENTS
Awake alert  Bilateral BS  Hemodynamic intact  Abdomen soft  Neurologic improved central cord syndrome  Paresthesias resolved  Still mild weakness motoric in biceps flexion  DC to rehab
I have seen and examined this patient with the ICU team.  No acute events overnight.  Patient is doing well and complaining of pain in her left hand.  She also endorses some numbness and tingling but denies pallor.  She endorses some tenderness in her high cervical spine.  She is hemodynamically stable, saturating 100% on room air, denies nausea/vomiting, and is eating and voiding well.  Her MAPs have remained above 80 per neurosurgery.  She is on Lovenox for DVT PPx.  Today, we will keep on her cervical collar, increase her gabapentin for pain control, get a B12 level, and get her out of bed to chair with a PT consult.

## 2022-11-04 NOTE — PROGRESS NOTE ADULT - SUBJECTIVE AND OBJECTIVE BOX
INTERVAL HPI/OVERNIGHT EVENTS/SUBJECTIVE:  Pt downgraded from icu yesterday. doing well, wearing c collar at all times. ambulating. AR vs. home    ICU Vital Signs Last 24 Hrs  T(C): 36.8 (03 Nov 2022 20:30), Max: 36.9 (03 Nov 2022 16:35)  T(F): 98.3 (03 Nov 2022 20:30), Max: 98.4 (03 Nov 2022 16:35)  HR: 73 (03 Nov 2022 20:30) (55 - 85)  BP: 123/68 (03 Nov 2022 20:30) (83/49 - 126/56)  BP(mean): 78 (03 Nov 2022 16:35) (59 - 86)  ABP: --  ABP(mean): --  RR: 19 (03 Nov 2022 20:30) (12 - 19)  SpO2: 93% (03 Nov 2022 20:30) (88% - 100%)    O2 Parameters below as of 03 Nov 2022 20:30  Patient On (Oxygen Delivery Method): room air        I&O's Detail    02 Nov 2022 07:01  -  03 Nov 2022 07:00  --------------------------------------------------------  IN:    Oral Fluid: 830 mL    Sodium Chloride 0.9% Bolus: 500 mL  Total IN: 1330 mL    OUT:    Voided (mL): 3050 mL  Total OUT: 3050 mL    Total NET: -1720 mL      03 Nov 2022 07:01  -  04 Nov 2022 00:05  --------------------------------------------------------  IN:  Total IN: 0 mL    OUT:    Voided (mL): 800 mL  Total OUT: 800 mL    Total NET: -800 mL    MEDICATIONS  (STANDING):  BACItracin   Ointment 1 Application(s) Topical two times a day  enoxaparin Injectable 40 milliGRAM(s) SubCutaneous every 24 hours  gabapentin 300 milliGRAM(s) Oral three times a day  influenza   Vaccine 0.5 milliLiter(s) IntraMuscular once  ketorolac   Injectable 15 milliGRAM(s) IV Push three times a day  lamoTRIgine 25 milliGRAM(s) Oral every 12 hours  methocarbamol 750 milliGRAM(s) Oral every 8 hours  senna 2 Tablet(s) Oral at bedtime    MEDICATIONS  (PRN):  polyethylene glycol 3350 17 Gram(s) Oral every 12 hours PRN Constipation  traMADol 25 milliGRAM(s) Oral every 4 hours PRN Moderate Pain (4 - 6)  traMADol 50 milliGRAM(s) Oral every 4 hours PRN Severe Pain (7 - 10)    MISC:     PHYSICAL EXAM:    Gen: NAD, GCS15  Pulmonary: ctab/l  Cardiovascular: RRR   Gastrointestinal:soft, ntnd    LABS:  CBC Full  -  ( 02 Nov 2022 03:50 )  WBC Count : 5.80 K/uL  RBC Count : 4.12 M/uL  Hemoglobin : 12.7 g/dL  Hematocrit : 38.8 %  Platelet Count - Automated : 196 K/uL  Mean Cell Volume : 94.2 fl  Mean Cell Hemoglobin : 30.8 pg  Mean Cell Hemoglobin Concentration : 32.7 gm/dL  Auto Neutrophil # : 2.54 K/uL  Auto Lymphocyte # : 2.52 K/uL  Auto Monocyte # : 0.53 K/uL  Auto Eosinophil # : 0.17 K/uL  Auto Basophil # : 0.02 K/uL  Auto Neutrophil % : 44.0 %  Auto Lymphocyte % : 43.4 %  Auto Monocyte % : 9.1 %  Auto Eosinophil % : 2.9 %  Auto Basophil % : 0.3 %    11-02    139  |  102  |  10.6  ----------------------------<  108<H>  4.3   |  29.0  |  0.65    Ca    8.6      02 Nov 2022 07:00  Phos  3.5     11-02  Mg     1.9     11-02      ASSESSMENT/PLAN:  62yFemale s/p fall with acute traumatic central cord syndrome    Neuro:   Consistently AAOx3, neurologic status stable, neurosurg following  C/w c collar, toradol and tylenol prn, robaxin q8h, gabapentin TID, and lamictal BID. Limit narcotics as much as able given delirium. Wean radiculopathy regimen given improvement.   For pain management and PT/OT consults  -c/w regular diet, c/w bowel regimen  -Dvt ppx: SCDS, lovenox, ambulation as tolerated  - pt/ot  -dispo : acute rehab

## 2022-11-04 NOTE — PROGRESS NOTE ADULT - ASSESSMENT
61 yo F   LF c/b cervical spine injury  now with neuropathic pain in BUE and posterior scalp    Med Recs:  gabapentin 300 milliGRAM(s) Oral three times a day  methocarbamol 750 milliGRAM(s) Oral every 8 hours   tramadol 25/50 q4h PRN mod/sev pain    Pain controlled  Pain service will sign off  Please reconsult as needed       can change robaxin to PRN spasms when due for rehab  can add acetaminophen 650mg q4h prn mild pain for rehab stay

## 2022-11-04 NOTE — PROGRESS NOTE ADULT - SUBJECTIVE AND OBJECTIVE BOX
HPI: 62 year old Female w/ PMHX [place] [facility] [place] +HS, -LOC, AC/AP use, presented with central cord syndrome.     Interval History: patient remains stable, denies any new complaints. Pt in agreement with plan for PT and conservative management with collar     Vital Signs Last 24 Hrs  T(C): 36.4 (04 Nov 2022 04:29), Max: 36.9 (03 Nov 2022 16:35)  T(F): 97.5 (04 Nov 2022 04:29), Max: 98.4 (03 Nov 2022 16:35)  HR: 72 (04 Nov 2022 04:29) (65 - 85)  BP: 110/67 (04 Nov 2022 04:29) (99/54 - 126/56)  BP(mean): 78 (03 Nov 2022 16:35) (67 - 80)  RR: 18 (04 Nov 2022 04:29) (13 - 19)  SpO2: 93% (04 Nov 2022 04:29) (92% - 100%)    Parameters below as of 04 Nov 2022 04:29  Patient On (Oxygen Delivery Method): room air    PHYSICAL EXAM:  GENERAL: NAD, well-groomed, overweight  HEAD: Facial lacs, dried blood noted. normocephalic  NECK: Cervical collar on  MENTAL STATUS: AAO x3; Awake. Opens eyes spontaneously. Appropriately conversant without aphasia, following simple commands.  CRANIAL NERVES: Visual acuity normal for age, PERRL. EOMI without nystagmus. Facial sensation intact V1-3 distribution b/l. Face symmetric w/ normal eye closure and smile, tongue midline. Hearing grossly intact. Speech clear.   MOTOR: strength 5/5 b/l upper and lower extremities aside from b/l hand  3/5 limited 2/2 painful paresthesias  SENSATION: grossly intact to light touch all extremities, hyperesthesias b/l hands among the dorsal aspect and improvement of pain in 2/3rd digit   REFLEXES: No clonus b/l EL    MEDICATIONS  (STANDING):  BACItracin   Ointment 1 Application(s) Topical two times a day  enoxaparin Injectable 40 milliGRAM(s) SubCutaneous every 24 hours  gabapentin 300 milliGRAM(s) Oral three times a day  influenza   Vaccine 0.5 milliLiter(s) IntraMuscular once  ketorolac   Injectable 15 milliGRAM(s) IV Push three times a day  methocarbamol 750 milliGRAM(s) Oral every 8 hours  senna 2 Tablet(s) Oral at bedtime    MEDICATIONS  (PRN):  polyethylene glycol 3350 17 Gram(s) Oral every 12 hours PRN Constipation  traMADol 25 milliGRAM(s) Oral every 4 hours PRN Moderate Pain (4 - 6)  traMADol 50 milliGRAM(s) Oral every 4 hours PRN Severe Pain (7 - 10)

## 2022-11-04 NOTE — PROGRESS NOTE ADULT - ASSESSMENT
62F with central cord syndrome     Recommendations   - C-Collar at all times   - F/u with Dr. Little Ornelas in 2 weeks   - Cleared for discharge to AR vs. Home with PT    62F with central cord syndrome     Recommendations   - C-Collar at all times   - F/u with Dr. Little Ornelas in 2 weeks   - Cleared for discharge to AR vs. Home with PT   - Neurosurgery signing off please reconsult PRN

## 2022-11-04 NOTE — PROGRESS NOTE ADULT - SUBJECTIVE AND OBJECTIVE BOX
Interval Hx:  Patient seen during rounds  Patient reports pain to be controlled on current medications  Patient denies sedation with medications     Analgesic Dosing for past 24 hours reviewed as below:    acetaminophen   IVPB ..   400 mL/Hr IV Intermittent (11-04-22 @ 08:58)    acetaminophen   IVPB ..   400 mL/Hr IV Intermittent (11-03-22 @ 17:13)    acetaminophen   IVPB ..   400 mL/Hr IV Intermittent (11-04-22 @ 00:50)    gabapentin   300 milliGRAM(s) Oral (11-04-22 @ 05:03)   300 milliGRAM(s) Oral (11-03-22 @ 21:58)   300 milliGRAM(s) Oral (11-03-22 @ 13:32)    ketorolac   Injectable   15 milliGRAM(s) IV Push (11-04-22 @ 05:04)   15 milliGRAM(s) IV Push (11-03-22 @ 22:00)   15 milliGRAM(s) IV Push (11-03-22 @ 13:31)    lamoTRIgine   25 milliGRAM(s) Oral (11-04-22 @ 05:03)   25 milliGRAM(s) Oral (11-03-22 @ 17:13)    methocarbamol   750 milliGRAM(s) Oral (11-04-22 @ 05:03)   750 milliGRAM(s) Oral (11-03-22 @ 21:58)   750 milliGRAM(s) Oral (11-03-22 @ 13:32)          T(C): 36.6 (11-04-22 @ 11:07), Max: 36.9 (11-03-22 @ 16:35)  HR: 62 (11-04-22 @ 11:07) (62 - 85)  BP: 132/84 (11-04-22 @ 11:07) (99/54 - 132/84)  RR: 18 (11-04-22 @ 11:07) (13 - 19)  SpO2: 96% (11-04-22 @ 11:07) (92% - 96%)      11-03-22 @ 07:01  -  11-04-22 @ 07:00  --------------------------------------------------------  IN: 0 mL / OUT: 800 mL / NET: -800 mL        BACItracin   Ointment 1 Application(s) Topical two times a day  enoxaparin Injectable 40 milliGRAM(s) SubCutaneous every 24 hours  gabapentin 300 milliGRAM(s) Oral three times a day  influenza   Vaccine 0.5 milliLiter(s) IntraMuscular once  ketorolac   Injectable 15 milliGRAM(s) IV Push three times a day  methocarbamol 750 milliGRAM(s) Oral every 8 hours  polyethylene glycol 3350 17 Gram(s) Oral every 12 hours PRN  senna 2 Tablet(s) Oral at bedtime  traMADol 25 milliGRAM(s) Oral every 4 hours PRN  traMADol 50 milliGRAM(s) Oral every 4 hours PRN                  Pain Service   [phone]

## 2022-11-05 ENCOUNTER — TRANSCRIPTION ENCOUNTER (OUTPATIENT)
Age: 62
End: 2022-11-05

## 2022-11-05 ENCOUNTER — INPATIENT (INPATIENT)
Facility: HOSPITAL | Age: 62
LOS: 6 days | Discharge: HOME HEALTH | DRG: 950 | End: 2022-11-12
Attending: INTERNAL MEDICINE | Admitting: INTERNAL MEDICINE
Payer: COMMERCIAL

## 2022-11-05 VITALS
DIASTOLIC BLOOD PRESSURE: 67 MMHG | WEIGHT: 216.93 LBS | TEMPERATURE: 98 F | OXYGEN SATURATION: 96 % | HEART RATE: 62 BPM | RESPIRATION RATE: 18 BRPM | HEIGHT: 65 IN | SYSTOLIC BLOOD PRESSURE: 111 MMHG

## 2022-11-05 VITALS
RESPIRATION RATE: 18 BRPM | TEMPERATURE: 98 F | OXYGEN SATURATION: 94 % | HEART RATE: 61 BPM | DIASTOLIC BLOOD PRESSURE: 70 MMHG | SYSTOLIC BLOOD PRESSURE: 105 MMHG

## 2022-11-05 DIAGNOSIS — Z90.49 ACQUIRED ABSENCE OF OTHER SPECIFIED PARTS OF DIGESTIVE TRACT: Chronic | ICD-10-CM

## 2022-11-05 DIAGNOSIS — Z98.890 OTHER SPECIFIED POSTPROCEDURAL STATES: Chronic | ICD-10-CM

## 2022-11-05 DIAGNOSIS — Z90.89 ACQUIRED ABSENCE OF OTHER ORGANS: Chronic | ICD-10-CM

## 2022-11-05 DIAGNOSIS — S14.121A CENTRAL CORD SYNDROME AT C1 LEVEL OF CERVICAL SPINAL CORD, INITIAL ENCOUNTER: ICD-10-CM

## 2022-11-05 LAB — SARS-COV-2 RNA SPEC QL NAA+PROBE: SIGNIFICANT CHANGE UP

## 2022-11-05 PROCEDURE — 97163 PT EVAL HIGH COMPLEX 45 MIN: CPT

## 2022-11-05 PROCEDURE — 82607 VITAMIN B-12: CPT

## 2022-11-05 PROCEDURE — 81001 URINALYSIS AUTO W/SCOPE: CPT

## 2022-11-05 PROCEDURE — 86901 BLOOD TYPING SEROLOGIC RH(D): CPT

## 2022-11-05 PROCEDURE — 90686 IIV4 VACC NO PRSV 0.5 ML IM: CPT

## 2022-11-05 PROCEDURE — 87637 SARSCOV2&INF A&B&RSV AMP PRB: CPT

## 2022-11-05 PROCEDURE — 73562 X-RAY EXAM OF KNEE 3: CPT

## 2022-11-05 PROCEDURE — 97530 THERAPEUTIC ACTIVITIES: CPT

## 2022-11-05 PROCEDURE — U0003: CPT

## 2022-11-05 PROCEDURE — 87640 STAPH A DNA AMP PROBE: CPT

## 2022-11-05 PROCEDURE — 97116 GAIT TRAINING THERAPY: CPT

## 2022-11-05 PROCEDURE — 72141 MRI NECK SPINE W/O DYE: CPT | Mod: MA

## 2022-11-05 PROCEDURE — 36415 COLL VENOUS BLD VENIPUNCTURE: CPT

## 2022-11-05 PROCEDURE — 97167 OT EVAL HIGH COMPLEX 60 MIN: CPT

## 2022-11-05 PROCEDURE — 84100 ASSAY OF PHOSPHORUS: CPT

## 2022-11-05 PROCEDURE — 80053 COMPREHEN METABOLIC PANEL: CPT

## 2022-11-05 PROCEDURE — 83735 ASSAY OF MAGNESIUM: CPT

## 2022-11-05 PROCEDURE — 85610 PROTHROMBIN TIME: CPT

## 2022-11-05 PROCEDURE — 86803 HEPATITIS C AB TEST: CPT

## 2022-11-05 PROCEDURE — 86850 RBC ANTIBODY SCREEN: CPT

## 2022-11-05 PROCEDURE — 80048 BASIC METABOLIC PNL TOTAL CA: CPT

## 2022-11-05 PROCEDURE — U0005: CPT

## 2022-11-05 PROCEDURE — 85025 COMPLETE CBC W/AUTO DIFF WBC: CPT

## 2022-11-05 PROCEDURE — 85730 THROMBOPLASTIN TIME PARTIAL: CPT

## 2022-11-05 PROCEDURE — 87641 MR-STAPH DNA AMP PROBE: CPT

## 2022-11-05 PROCEDURE — 86900 BLOOD TYPING SEROLOGIC ABO: CPT

## 2022-11-05 RX ORDER — POLYETHYLENE GLYCOL 3350 17 G/17G
17 POWDER, FOR SOLUTION ORAL
Refills: 0 | Status: DISCONTINUED | OUTPATIENT
Start: 2022-11-05 | End: 2022-11-12

## 2022-11-05 RX ORDER — ENOXAPARIN SODIUM 100 MG/ML
40 INJECTION SUBCUTANEOUS EVERY 24 HOURS
Refills: 0 | Status: DISCONTINUED | OUTPATIENT
Start: 2022-11-05 | End: 2022-11-12

## 2022-11-05 RX ORDER — KETOROLAC TROMETHAMINE 30 MG/ML
15 SYRINGE (ML) INJECTION THREE TIMES A DAY
Refills: 0 | Status: DISCONTINUED | OUTPATIENT
Start: 2022-11-05 | End: 2022-11-05

## 2022-11-05 RX ORDER — BACITRACIN ZINC 500 UNIT/G
1 OINTMENT IN PACKET (EA) TOPICAL
Refills: 0 | Status: DISCONTINUED | OUTPATIENT
Start: 2022-11-05 | End: 2022-11-06

## 2022-11-05 RX ORDER — TRAMADOL HYDROCHLORIDE 50 MG/1
50 TABLET ORAL EVERY 4 HOURS
Refills: 0 | Status: DISCONTINUED | OUTPATIENT
Start: 2022-11-05 | End: 2022-11-12

## 2022-11-05 RX ORDER — TRAMADOL HYDROCHLORIDE 50 MG/1
25 TABLET ORAL EVERY 4 HOURS
Refills: 0 | Status: DISCONTINUED | OUTPATIENT
Start: 2022-11-05 | End: 2022-11-12

## 2022-11-05 RX ORDER — GABAPENTIN 400 MG/1
300 CAPSULE ORAL THREE TIMES A DAY
Refills: 0 | Status: DISCONTINUED | OUTPATIENT
Start: 2022-11-05 | End: 2022-11-07

## 2022-11-05 RX ADMIN — TRAMADOL HYDROCHLORIDE 25 MILLIGRAM(S): 50 TABLET ORAL at 10:00

## 2022-11-05 RX ADMIN — Medication 15 MILLIGRAM(S): at 05:13

## 2022-11-05 RX ADMIN — TRAMADOL HYDROCHLORIDE 50 MILLIGRAM(S): 50 TABLET ORAL at 21:38

## 2022-11-05 RX ADMIN — GABAPENTIN 300 MILLIGRAM(S): 400 CAPSULE ORAL at 05:09

## 2022-11-05 RX ADMIN — TRAMADOL HYDROCHLORIDE 25 MILLIGRAM(S): 50 TABLET ORAL at 09:15

## 2022-11-05 RX ADMIN — Medication 1 APPLICATION(S): at 20:36

## 2022-11-05 RX ADMIN — Medication 1 APPLICATION(S): at 05:10

## 2022-11-05 RX ADMIN — TRAMADOL HYDROCHLORIDE 50 MILLIGRAM(S): 50 TABLET ORAL at 05:17

## 2022-11-05 RX ADMIN — METHOCARBAMOL 750 MILLIGRAM(S): 500 TABLET, FILM COATED ORAL at 14:09

## 2022-11-05 RX ADMIN — METHOCARBAMOL 750 MILLIGRAM(S): 500 TABLET, FILM COATED ORAL at 05:09

## 2022-11-05 RX ADMIN — Medication 15 MILLIGRAM(S): at 05:09

## 2022-11-05 RX ADMIN — GABAPENTIN 300 MILLIGRAM(S): 400 CAPSULE ORAL at 21:51

## 2022-11-05 RX ADMIN — Medication 15 MILLIGRAM(S): at 14:14

## 2022-11-05 RX ADMIN — ENOXAPARIN SODIUM 40 MILLIGRAM(S): 100 INJECTION SUBCUTANEOUS at 21:51

## 2022-11-05 RX ADMIN — INFLUENZA VIRUS VACCINE 0.5 MILLILITER(S): 15; 15; 15; 15 SUSPENSION INTRAMUSCULAR at 11:11

## 2022-11-05 RX ADMIN — GABAPENTIN 300 MILLIGRAM(S): 400 CAPSULE ORAL at 14:09

## 2022-11-05 RX ADMIN — Medication 15 MILLIGRAM(S): at 14:09

## 2022-11-05 RX ADMIN — TRAMADOL HYDROCHLORIDE 50 MILLIGRAM(S): 50 TABLET ORAL at 20:44

## 2022-11-05 RX ADMIN — TRAMADOL HYDROCHLORIDE 50 MILLIGRAM(S): 50 TABLET ORAL at 04:16

## 2022-11-05 NOTE — H&P ADULT - NSHPREVIEWOFSYSTEMS_GEN_ALL_CORE
REVIEW OF SYSTEMS  Constitutional: No fever, No Chills, No fatigue  HEENT: No eye pain, No visual disturbances, No difficulty hearing, No Dysphagia   Pulm: No cough,  No shortness of breath  Cardio: No chest pain, No palpitations  GI:  No abdominal pain, No nausea, No vomiting, No diarrhea, No constipation, No incontinence, Last Bowel Movement yesteday  : No dysuria, No frequency, No hematuria, No incontinence  Neuro: No headaches, No memory loss, No loss of strength, No numbness, No tremors  Skin: No itching, no rashes No lesions   Endo: No temperature intolerance  MSK: No joint pain, No joint swelling, No muscle pain, allodynai like pain in C6 dermatome b/l distribution   Psych:  No depression, No anxiety

## 2022-11-05 NOTE — DISCHARGE NOTE NURSING/CASE MANAGEMENT/SOCIAL WORK - PATIENT PORTAL LINK FT
recommendations    4. Medication noncompliance  -Patient has not been taking any medications for the past 3 months except baby aspirin  -Patient states he did not bring his medications back from the [facility] never got a refill  -Urged patient to comply with medication    5. Elevated Ddimer  -DDimer 447  -CTA pulmonary with IV contrast with no evidence of pulmonary embolism or pulmonary abnormality    Other chronic medical conditions:  restart all home meds except stated above or contraindicated.  HLD   HTN   BPH   Obesity    Diet Diet NPO   DVT Prophylaxis [] Lovenox, [x]  Heparin, [] SCDs, [] Ambulation   GI Prophylaxis [x] PPI,  [] H2 Blocker,  [] Carafate,  [] Diet/Tube Feeds   Code Status Full Code   Disposition Patient requires continued admission due to Chest pain   MDM [] Low, [] Moderate,[x]  High  Patient's risk as above due to acuity of condition with potential for decompensation. History of Present Illness:     Chief Complaint: Chest pain    Jo Ann Scott is a 56 y.o.  male with a reviewed and a contributory family history of cardiac disease and a PMH as stated above, who presents with complaints of chest pain. Onset was 1 day ago, with worsening course since that time. The patient describes the pain as constant, pressure like in nature, radiates to the left neck/jaw and left shoulder. Patient rates pain as a 7/10 in intensity. Associated symptoms are dyspnea. Aggravating factors are none. Alleviating factors are: nitroglycerin 1 tablets, position change and rest. Patient's cardiac risk factors are advanced age (older than 54 for men, 72 for women), dyslipidemia, family history of premature cardiovascular disease, hypertension, male gender, obesity (BMI >= 30 kg/m2) and smoking/ tobacco exposure. Patient endorses history of CAD with stent placement x2 in 2018.   Patient states he moved to the [facility] and came back to the [place] during the pandemic and has been out of his You can access the FollowMyHealth Patient Portal offered by [facility] by registering at the following website: [email or web address]. By joining [facility]’s FollowMyHealth portal, you will also be able to view your health information using other applications (apps) compatible with our system. medications for the past 3 months and has only been taking his baby aspirin. He endorses daily cigar use. Patient's risk factors for DVT/PE: none. Patient denies any history of CKD or ever being told he has an elevated creatinine and decreased GFR. Otherwise patient endorses headache s/p sublingual nitro. Patient denies fever, chills, abdominal pain, nausea, vomiting, diarrhea, dark tarry stools, blood per rectum, dysuria, frequency, or urgency. Discussed case with ED provider. ROS:   Review of Systems   Constitutional: Negative for appetite change, diaphoresis, fatigue and fever. HENT: Negative for congestion, rhinorrhea and sore throat. Eyes: Negative for visual disturbance. Respiratory: Positive for chest tightness and shortness of breath. Negative for wheezing. Cardiovascular: Positive for chest pain. Negative for palpitations and leg swelling. Gastrointestinal: Negative for abdominal pain, diarrhea, nausea and vomiting. Genitourinary: Negative for dysuria, frequency, hematuria and urgency. Musculoskeletal: Negative for arthralgias. Skin: Negative for color change and rash. Neurological: Positive for headaches. Negative for dizziness, seizures, weakness and numbness. Psychiatric/Behavioral: Negative for confusion. Objective:   No intake or output data in the 24 hours ending 08/25/21 0523   Vitals:   Vitals:    08/25/21 0500   BP: (!) 204/129   Pulse:    Resp:    Temp:    SpO2: 99%     BP (!) 204/129   Pulse 66   Temp 98 °F (36.7 °C) (Oral)   Resp 16   SpO2 99%   Physical Exam:   Physical Exam  Vitals and nursing note reviewed. Constitutional:       General: He is awake. He is not in acute distress. Appearance: Normal appearance. He is obese. He is not ill-appearing, toxic-appearing or diaphoretic. HENT:      Head: Atraumatic. Right Ear: External ear normal.      Left Ear: External ear normal.      Nose: Nose normal. No rhinorrhea.       Mouth/Throat: Mouth: Mucous membranes are moist.   Eyes:      General: No scleral icterus. Conjunctiva/sclera: Conjunctivae normal.      Pupils: Pupils are equal, round, and reactive to light. Cardiovascular:      Rate and Rhythm: Normal rate and regular rhythm. Pulses: Normal pulses. Heart sounds: Normal heart sounds. No murmur heard. No gallop. Pulmonary:      Effort: Pulmonary effort is normal. No tachypnea, prolonged expiration or respiratory distress. Breath sounds: Normal breath sounds. No wheezing, rhonchi or rales. Abdominal:      General: Abdomen is protuberant. Bowel sounds are normal. There is no distension. Palpations: Abdomen is soft. Tenderness: There is no abdominal tenderness. There is no guarding or rebound. Negative signs include Colindres's sign and Rovsing's sign. Musculoskeletal:         General: Normal range of motion. Cervical back: Neck supple. Right lower leg: No edema. Left lower leg: No edema. Skin:     General: Skin is warm and dry. Capillary Refill: Capillary refill takes less than 2 seconds. Neurological:      General: No focal deficit present. Mental Status: He is alert and oriented to person, place, and time. Mental status is at baseline. Cranial Nerves: No cranial nerve deficit, dysarthria or facial asymmetry. Motor: No tremor or seizure activity. Psychiatric:         Attention and Perception: He is attentive. Mood and Affect: Mood is not anxious. Speech: He is communicative. Speech is not slurred. Behavior: Behavior is cooperative. Past Medical History:      Past Medical History:   Diagnosis Date    Blood transfusion 2010    CAD (coronary artery disease)     Chest pain     GERD (gastroesophageal reflux disease)     H/O Doppler lower arterial ultrasound 02/13/2020     No evidence of significant occlusive arterial disease.     H/O Doppler lower venous ultrasound 08/20/2019     No evidence of significant venous insufficiency noted in the bilateral lower, no DVT,SVT.    H/O exercise stress test 05/31/2018    treadmill    H/O exercise stress test 07/23/2018 7/2018: treadmill, No Ischemia noted pt can start rehab.  History of exercise stress test 06/19/2017    treadmill    History of exercise stress test 06/27/2017    treadmill    History of GI bleed 2010    History of nuclear stress test 08/09/2017    EF42%,mild to moderate inferior ischemia    History of nuclear stress test 10/11/2018    Normal EF 48%, ABN Medium size mild inferior ischemia, no change from 8/2017    Hx of echocardiogram 08/09/2017    normal,EF55-60%    HX OTHER MEDICAL     Foster care until age 10    Hyperlipidemia     Hypertension 2008    Osteoarthritis     Primarily affecting the low back    Prostatic hypertrophy, benign     S/P PTCA (percutaneous transluminal coronary angioplasty) 06/19/2018 6/19/18: Stent to LAD.  5/22/18: Stent to OM     Unspecified cerebral artery occlusion with cerebral infarction 1997    pt states he had a \"mini stroke\"\"I did not know it was  a stroke, but after I had a concussion they told me the scan showed I had had a stroke\"     PSHX:  has a past surgical history that includes Shoulder arthroscopy (Right, 1992); other surgical history (11/2012); Coronary angioplasty with stent (06/01/2017); and cardiovascular stress test (05/2017). Allergies: No Known Allergies    FAM HX: family history includes Alcohol Abuse in his brother; Alzheimer's Disease in his mother; Breast Cancer in his mother; Cancer in his brother; Coronary Art Dis in his father; Heart Attack (age of onset: 80) in his father; Heart Disease in his brother and brother; High Blood Pressure in his father; Hypertension in his brother and father; Learning Disabilities in his brother; Stroke in his father. Soc HX:   Social History     Socioeconomic History    Marital status:       Spouse name: None    Number of children: 3    Years of education: None    Highest education level: None   Occupational History    None   Tobacco Use    Smoking status: Light Tobacco Smoker     Packs/day: 0.25     Years: 30.00     Pack years: 7.50     Types: Cigars     Last attempt to quit: 2017     Years since quittin.6    Smokeless tobacco: Never Used   Vaping Use    Vaping Use: Never used   Substance and Sexual Activity    Alcohol use: Not Currently     Comment: rarely    Drug use: No    Sexual activity: Not Currently   Other Topics Concern    None   Social History Narrative    None     Social Determinants of Health     Financial Resource Strain:     Difficulty of Paying Living Expenses:    Food Insecurity:     Worried About Running Out of Food in the Last Year:     Ran Out of Food in the Last Year:    Transportation Needs:     Lack of Transportation (Medical):      Lack of Transportation (Non-Medical):    Physical Activity:     Days of Exercise per Week:     Minutes of Exercise per Session:    Stress:     Feeling of Stress :    Social Connections:     Frequency of Communication with Friends and Family:     Frequency of Social Gatherings with Friends and Family:     Attends [employer] witness Services:     Active Member of Clubs or Organizations:     Attends Club or Organization Meetings:     Marital Status:    Intimate Partner Violence:     Fear of Current or Ex-Partner:     Emotionally Abused:     Physically Abused:     Sexually Abused:        Data:   CBC with Differential:    Lab Results   Component Value Date    WBC 5.1 2021    RBC 5.63 2021    HGB 16.9 2021    HCT 51.7 2021     2021    MCV 91.8 2021    MCH 30.0 2021    MCHC 32.7 2021    RDW 14.6 2021    SEGSPCT 56.0 2021    LYMPHOPCT 25.5 2021    MONOPCT 13.2 2021    EOSPCT 2.9 2012    BASOPCT 1.0 2021    MONOSABS 0.7 2021    LYMPHSABS 1.3 2021    EOSABS 0.2 daily. Yes Historical Provider, MD     Medications:    sodium chloride flush  5-40 mL IntraVENous BID    heparin (porcine)  5,000 Units Subcutaneous 3 times per day    aspirin  81 mg Oral Daily    finasteride  5 mg Oral Daily    atorvastatin  40 mg Oral Daily    amLODIPine  10 mg Oral Daily    metoprolol tartrate  50 mg Oral BID    clopidogrel  75 mg Oral Daily    sodium chloride flush  5-40 mL IntraVENous 2 times per day      Infusions:    sodium chloride       PRN Meds: nitroGLYCERIN, 0.4 mg, Q5 Min PRN  sodium chloride flush, 5-40 mL, PRN  sodium chloride, 25 mL, PRN  ondansetron, 4 mg, Q8H PRN   Or  ondansetron, 4 mg, Q6H PRN  acetaminophen, 650 mg, Q6H PRN   Or  acetaminophen, 650 mg, Q6H PRN  polyethylene glycol, 17 g, Daily PRN  nitroGLYCERIN, 0.4 mg, Q5 Min PRN        Electronically signed by Jhony Stephens DO on 8/25/2021 at 5:23 AM      This dictation was created with voice recognition software. While attempts have been made to review the dictation as it is transcribed, on occasion the spoken word can be misinterpreted by the technology leading to omissions or inappropriate words, phrases or sentences.

## 2022-11-05 NOTE — H&P ADULT - ATTENDING COMMENTS
Seen and examined     63 yo F Hx Rt arm radiculopathy s/p melanoma excision [place] [facility] [place]tingling both UE and upper lip laceration, post fall. T/F to [facility] on 10/30/22, MRI spine showed cord compression at C3/C4 and C4/C5 levels. Dxed with Central cord syndrome. Neurosurgery noted preserved cord perfusion and recommended Cervical Collar, with plan for f/u. Acute rehab admission 10/5/22    Pleasant. living with family, has some stairs at home  Ambulatory without gait device    Currently has progression of b/l UE tingling/numbness, allodynia both hands. Ambulating with walker, mod independent for ADLs, min assist bed mobility and transfers  Reports progression of neck spasms in last 1-2 days    Alert and fully oriented  LT sensation normal except allodynia b/l UE and dorsum of both hands  MMT--4+/5 Legs  Antigravity both upper extremities  No bowel/bladder dysfunction    RECENT LABS/IMAGING                        12.5   5.52  )-----------( 266      ( 06 Nov 2022 06:00 )             38.0     11-06    142  |  107  |  16  ----------------------------<  82  4.4   |  29  |  0.87    Ca    8.8      06 Nov 2022 06:00    TPro  6.1  /  Alb  3.0<L>  /  TBili  0.4  /  DBili  x   /  AST  84<H>  /  ALT  108<H>  /  AlkPhos  94  11-06      Dx: Central cervical spinal cord syndrome due to C3/C4 and C4/C5 Spinal stenosis, Recent fall  --B/L Upper extremity weakness with neuropathy--  allodynia/tingling/numbness  Continue PT/OT  C collar at all time     Rash upper back--hyrdocort cream and hypoallogenic sheet    Routine care  DVT ppx--lovenox.   GI protection/bowel care--miralax/senna  Pain mgt--gabapentin, robaxin, Tramadol    Est dc to be determined at next IDT, probably 2 wks Seen and examined     61 yo F Hx Rt arm radiculopathy s/p melanoma excision Initially admitted to [facility] with bilateral numbness/tingling both UE and upper lip laceration, post fall. T/F to [facility] on 10/30/22, MRI spine showed cord compression at C3/C4 and C4/C5 levels. Dxed with Central cord syndrome. Neurosurgery noted preserved cord perfusion and recommended Cervical Collar, with plan for f/u. Acute rehab admission 10/5/22    Pleasant. living with family, has some stairs at home  Ambulatory without gait device    Currently has progression of b/l UE tingling/numbness, allodynia both hands. Ambulating with walker, mod independent for ADLs, min assist bed mobility and transfers  Reports progression of neck spasms in last 1-2 days  Reports dx of radiculopathy Rt UE after EMG, but unable to get NCS due to hx of axillary surgery for melanoma    Alert and fully oriented  LT sensation normal except allodynia b/l UE and dorsum of both hands  MMT--4+/5 Legs  Antigravity both upper extremities  No bowel/bladder dysfunction    RECENT LABS/IMAGING                        12.5   5.52  )-----------( 266      ( 06 Nov 2022 06:00 )             38.0     11-06    142  |  107  |  16  ----------------------------<  82  4.4   |  29  |  0.87    Ca    8.8      06 Nov 2022 06:00    TPro  6.1  /  Alb  3.0<L>  /  TBili  0.4  /  DBili  x   /  AST  84<H>  /  ALT  108<H>  /  AlkPhos  94  11-06      Dx: Central cervical spinal cord syndrome due to C3/C4 and C4/C5 Spinal stenosis, Recent fall  --B/L Upper extremity weakness with neuropathy--  allodynia/tingling/numbness  Continue PT/OT  C collar at all time     Rash upper back--hyrdocort cream and hypoallogenic sheet    Routine care  DVT ppx--lovenox.   GI protection/bowel care--miralax/senna  Pain mgt--gabapentin, robaxin, Tramadol    Est dc to be determined at next IDT, probably 2 wks

## 2022-11-05 NOTE — PROGRESS NOTE ADULT - SUBJECTIVE AND OBJECTIVE BOX
INTERVAL HPI/OVERNIGHT EVENTS/SUBJECTIVE:  Pt downgraded from icu yesterday. doing well, wearing c collar at all times. ambulating. d/c to [facility] tomorrow     ICU Vital Signs Last 24 Hrs  T(C): 36.6 (04 Nov 2022 21:59), Max: 36.7 (04 Nov 2022 16:52)  T(F): 97.8 (04 Nov 2022 21:59), Max: 98 (04 Nov 2022 16:52)  HR: 65 (04 Nov 2022 21:59) (62 - 72)  BP: 117/76 (04 Nov 2022 21:59) (110/67 - 150/82)  BP(mean): --  ABP: --  ABP(mean): --  RR: 19 (04 Nov 2022 21:59) (18 - 19)  SpO2: 95% (04 Nov 2022 21:59) (93% - 96%)    O2 Parameters below as of 04 Nov 2022 21:59  Patient On (Oxygen Delivery Method): room air    I&O's Detail    03 Nov 2022 07:01  -  04 Nov 2022 07:00  --------------------------------------------------------  IN:  Total IN: 0 mL    OUT:    Voided (mL): 800 mL  Total OUT: 800 mL    Total NET: -800 mL      MEDICATIONS  (STANDING):  BACItracin   Ointment 1 Application(s) Topical two times a day  enoxaparin Injectable 40 milliGRAM(s) SubCutaneous every 24 hours  gabapentin 300 milliGRAM(s) Oral three times a day  influenza   Vaccine 0.5 milliLiter(s) IntraMuscular once  ketorolac   Injectable 15 milliGRAM(s) IV Push three times a day  methocarbamol 750 milliGRAM(s) Oral every 8 hours  senna 2 Tablet(s) Oral at bedtime    MEDICATIONS  (PRN):  polyethylene glycol 3350 17 Gram(s) Oral every 12 hours PRN Constipation  traMADol 25 milliGRAM(s) Oral every 4 hours PRN Moderate Pain (4 - 6)  traMADol 50 milliGRAM(s) Oral every 4 hours PRN Severe Pain (7 - 10)      MISC:     PHYSICAL EXAM:  Gen: NAD, GCS15  Pulmonary: ctab/l  Cardiovascular: RRR   Gastrointestinal:soft, ntnd    ASSESSMENT/PLAN:  62yFemale  fall with acute traumatic central cord syndrome    Neuro:   Consistently AAOx3, neurologic status stable, neurosurg following  C/w c collar, toradol and tylenol prn, robaxin q8h, gabapentin TID, and lamictal BID. Limit narcotics as much as able given delirium. Wean radiculopathy regimen given improvement.   For pain management and PT/OT consults  -c/w regular diet, c/w bowel regimen  -Dvt ppx: SCDS, lovenox, ambulation as tolerated  - pt/ot  -dispo : acute rehab d/c today

## 2022-11-05 NOTE — PROGRESS NOTE ADULT - REASON FOR ADMISSION
s/p fall, transfer from [facility]

## 2022-11-05 NOTE — DISCHARGE NOTE NURSING/CASE MANAGEMENT/SOCIAL WORK - NSDCPEFALRISK_GEN_ALL_CORE
For information on Fall & Injury Prevention, visit: [email or web address] OR  [email or web address] OR  https://www.cdc.gov/steadi/patient.html

## 2022-11-05 NOTE — H&P ADULT - ASSESSMENT
Ms. Scott is a 61 yo F who transferred [facility] on 10/30/22 s/p mechanical fall while in her driveway for MRI which which showed cord compression at C3/C4 and C4/C5 levels. Admitted for multidisciplinary rehab program for functional deficits pertaining to Central Cord Syndrome      #Comprehensive Multidisciplinary Rehab Program:  - Gait, ADL, Functional impairments  - PT/OT/ SLP 3 hours a day 5 days a week    #Central Cord Syndrome   C/w C-Collar   Limit narcotics given histoyr of delirium     #Mood / Cognition:  - Neuropsych evaluation     #Sleep:  - Maintain quiet hours and low stim environment    #Pain:  -c/w Robaxin 750mg PO Q8  -c/w Gabapentin 300mg PO TID   -c/w Toradol IV 15mg TID   -c/w tramadol 25mg PO q4 for moderate pain PRN   -c/w tramadol 50mg PO q4 for severe pain PRN   - avoid sedating meds that may affect cognitive recovery    #GI/Bowel:  - Senna 2 tabs daily  - GI ppx: Miralax 17g PO BID PRN for constipation     #/Bladder:  - Monitor PVR if no void in 8h; SC for >400 cc  - Toileting schedule q4h    #Diet / Dysphagia:    - Diet: Regular Diet   - Nutrition to follow    #Skin/ Pressure Injury Prevention:  - assessment on admission   - Incisions:  - Turn Q2hrs in bed while awake, OOB to Chair, PT/OT/SLP     #DVT prophylaxis: Lovenox 40mg SQ QD  - SCDs  - Last doppler unknown     #Precautions/ Restrictions  - Falls, Spine  - Ortho:      Weight bearing status: Weight bearing as tolerated   - COVID PCR: COVID Neg 11/4/22    --------------------------------------------  Outpatient Follow up:    Sivakumar Mays)  Neurosurgery  [address], [address]  [place] [address]  Phone: [phone]  Fax: [phone]  Follow Up Time:    --------------------------------------------      MEDICAL PROGNOSIS: GOOD            REHAB POTENTIAL: GOOD             ESTIMATED DISPOSITION: HOME WITH HOME CARE            ELOS: 10-14 Days   EXPECTED THERAPY:     P.T. 1hr/day       O.T. 1hr/day      S.L.P. 1hr/day     P&O Unnecessary     EXP FREQUENCY: 5 days per 7 day period     PRESCREEN COMPARISON:   I have reviewed the prescreen information and I have found no relevant changes between the preadmission screening and my post admission evaluation     RATIONALE FOR INPATIENT ADMISSION - Patient demonstrates the following: (check all that apply)  [X] Medically appropriate for rehabilitation admission  [X] Has attainable rehab goals with an appropriate initial discharge plan  [X] Has rehabilitation potential (expected to make a significant improvement within a reasonable period of time)   [X] Requires close medical management by a rehab physician, rehab nursing care, Hospitalist and comprehensive interdisciplinary team (including PT, OT, & or SLP, Prosthetics and Orthotics)   Ms. Scott is a 61 yo F who transferred [facility] on 10/30/22 s/p mechanical fall while in her driveway for MRI which which showed cord compression at C3/C4 and C4/C5 levels. Admitted for multidisciplinary rehab program for functional deficits pertaining to Central Cord Syndrome      #Comprehensive Multidisciplinary Rehab Program:  - Gait, ADL, Functional impairments  - PT/OT/ SLP 3 hours a day 5 days a week    #Central Cord Syndrome   C/w C-Collar   Limit narcotics given histoyr of delirium     #Mood / Cognition:  - Neuropsych evaluation     #Sleep:  - Maintain quiet hours and low stim environment    #Pain:  -c/w Robaxin 750mg PO Q8  -c/w Gabapentin 300mg PO TID   -c/w Toradol IV 15mg TID   -c/w tramadol 25mg PO q4 for moderate pain PRN   -c/w tramadol 50mg PO q4 for severe pain PRN   - avoid sedating meds that may affect cognitive recovery    #GI/Bowel:  - Senna 2 tabs daily  - GI ppx: Miralax 17g PO BID PRN for constipation     #/Bladder:  - Monitor PVR if no void in 8h; SC for >400 cc  - Toileting schedule q4h    #Diet / Dysphagia:    - Diet: Regular Diet   - Nutrition to follow    #Skin/ Pressure Injury Prevention:  -bacitracin for skin abrasions on face   - assessment on admission   - Incisions:  - Turn Q2hrs in bed while awake, OOB to Chair, PT/OT/SLP     #DVT prophylaxis: Lovenox 40mg SQ QD  - SCDs  - Last doppler unknown     #Precautions/ Restrictions  - Falls, Spine  - Ortho:      Weight bearing status: Weight bearing as tolerated   - COVID PCR: COVID Neg 11/4/22    --------------------------------------------  Outpatient Follow up:    Sivakumar Mays)  Neurosurgery  [address], [address]  [place] [address]  Phone: [phone]  Fax: [phone]  Follow Up Time:    --------------------------------------------      MEDICAL PROGNOSIS: GOOD            REHAB POTENTIAL: GOOD             ESTIMATED DISPOSITION: HOME WITH HOME CARE            ELOS: 10-14 Days   EXPECTED THERAPY:     P.T. 1hr/day       O.T. 1hr/day      S.L.P. 1hr/day     P&O Unnecessary     EXP FREQUENCY: 5 days per 7 day period     PRESCREEN COMPARISON:   I have reviewed the prescreen information and I have found no relevant changes between the preadmission screening and my post admission evaluation     RATIONALE FOR INPATIENT ADMISSION - Patient demonstrates the following: (check all that apply)  [X] Medically appropriate for rehabilitation admission  [X] Has attainable rehab goals with an appropriate initial discharge plan  [X] Has rehabilitation potential (expected to make a significant improvement within a reasonable period of time)   [X] Requires close medical management by a rehab physician, rehab nursing care, Hospitalist and comprehensive interdisciplinary team (including PT, OT, & or SLP, Prosthetics and Orthotics)   Ms. Scott is a 63 yo F who transferred [facility] on 10/30/22 s/p mechanical fall while in her driveway for MRI which which showed cord compression at C3/C4 and C4/C5 levels. Admitted for multidisciplinary rehab program for functional deficits pertaining to Central Cord Syndrome      #Comprehensive Multidisciplinary Rehab Program:  - Gait, ADL, Functional impairments  - PT/OT/ SLP 3 hours a day 5 days a week    #Central Cord Syndrome   C/w C-Collar   Limit narcotics given history of delirium     #Mood / Cognition:  - Neuropsych evaluation     #Sleep:  - Maintain quiet hours and low stim environment    #Pain:  -c/w Robaxin 750mg PO Q8  -c/w Gabapentin 300mg PO TID   -c/w Toradol IV 15mg TID   -c/w tramadol 25mg PO q4 for moderate pain PRN   -c/w tramadol 50mg PO q4 for severe pain PRN   - avoid sedating meds that may affect cognitive recovery    #GI/Bowel:  - Senna 2 tabs daily  - GI ppx: Miralax 17g PO BID PRN for constipation     #/Bladder:  - Monitor PVR if no void in 8h; SC for >400 cc  - Toileting schedule q4h    #Diet / Dysphagia:    - Diet: Regular Diet   - Nutrition to follow    #Skin/ Pressure Injury Prevention:  -bacitracin for skin abrasions on face   - assessment on admission   - Incisions:  - Turn Q2hrs in bed while awake, OOB to Chair, PT/OT/SLP     #DVT prophylaxis: Lovenox 40mg SQ QD  - SCDs  - Last doppler unknown     #Precautions/ Restrictions  - Falls, Spine  - Ortho:      Weight bearing status: Weight bearing as tolerated   - COVID PCR: COVID Neg 11/4/22    --------------------------------------------  Outpatient Follow up:    Sivakumar Mays)  Neurosurgery  [address], [address]  [place] [address]  Phone: [phone]  Fax: [phone]  Follow Up Time:    --------------------------------------------      MEDICAL PROGNOSIS: GOOD            REHAB POTENTIAL: GOOD             ESTIMATED DISPOSITION: HOME WITH HOME CARE            ELOS: 10-14 Days   EXPECTED THERAPY:     P.T. 1hr/day       O.T. 1hr/day      S.L.P. 1hr/day     P&O Unnecessary     EXP FREQUENCY: 5 days per 7 day period     PRESCREEN COMPARISON:   I have reviewed the prescreen information and I have found no relevant changes between the preadmission screening and my post admission evaluation     RATIONALE FOR INPATIENT ADMISSION - Patient demonstrates the following: (check all that apply)  [X] Medically appropriate for rehabilitation admission  [X] Has attainable rehab goals with an appropriate initial discharge plan  [X] Has rehabilitation potential (expected to make a significant improvement within a reasonable period of time)   [X] Requires close medical management by a rehab physician, rehab nursing care, Hospitalist and comprehensive interdisciplinary team (including PT, OT, & or SLP, Prosthetics and Orthotics)   Ms. Scott is a 63 yo F who transferred [facility] on 10/30/22 s/p mechanical fall while in her driveway for MRI which which showed cord compression at C3/C4 and C4/C5 levels. Admitted for multidisciplinary rehab program for functional deficits pertaining to Central Cord Syndrome        #Central Cord Syndrome   Commence PT/OT 3 hours a day 5 days a week  C/w C-Collar   Limit narcotics given history of delirium     #Mood / Cognition:  - Neuropsych evaluation     #Sleep:  - Maintain quiet hours and low stim environment    #Pain:  -c/w Robaxin 750mg PO Q8  -c/w Gabapentin 300mg PO TID   -c/w Toradol IV 15mg TID   -c/w tramadol 25mg PO q4 for moderate pain PRN   -c/w tramadol 50mg PO q4 for severe pain PRN   - avoid sedating meds that may affect cognitive recovery    #GI/Bowel:  - Senna 2 tabs daily  - GI ppx: Miralax 17g PO BID PRN for constipation     #/Bladder:--voiding appropriately    #Diet / Dysphagia:    - Diet: Regular Diet   - Nutrition to follow    #Skin/ Pressure Injury Prevention:  -bacitracin for skin abrasions on face   - assessment on admission   - Incisions:  - Turn Q2hrs in bed while awake, OOB to Chair, PT/OT/SLP     #DVT prophylaxis: Lovenox 40mg SQ QD  - SCDs  - Last doppler unknown     #Precautions/ Restrictions  - Falls, Spine  - Ortho:      Weight bearing status: Weight bearing as tolerated   - COVID PCR: COVID Neg 11/4/22    --------------------------------------------  Outpatient Follow up:    Sivakumar Mays)  Neurosurgery  [address], [address]  [place] [address]  Phone: [phone]  Fax: [phone]  Follow Up Time:    --------------------------------------------      MEDICAL PROGNOSIS: GOOD            REHAB POTENTIAL: GOOD             ESTIMATED DISPOSITION: HOME WITH HOME CARE            ELOS: 10-14 Days   EXPECTED THERAPY:     P.T. 1hr/day       O.T. 1hr/day      S.L.P. 1hr/day     P&O Unnecessary     EXP FREQUENCY: 5 days per 7 day period     PRESCREEN COMPARISON:   I have reviewed the prescreen information and I have found no relevant changes between the preadmission screening and my post admission evaluation     RATIONALE FOR INPATIENT ADMISSION - Patient demonstrates the following: (check all that apply)  [X] Medically appropriate for rehabilitation admission  [X] Has attainable rehab goals with an appropriate initial discharge plan  [X] Has rehabilitation potential (expected to make a significant improvement within a reasonable period of time)   [X] Requires close medical management by a rehab physician, rehab nursing care, Hospitalist and comprehensive interdisciplinary team (including PT, OT, & or SLP, Prosthetics and Orthotics)

## 2022-11-05 NOTE — PROGRESS NOTE ADULT - PROVIDER SPECIALTY LIST ADULT
Neurosurgery
SICU
Trauma Surgery
Neurosurgery
Neurosurgery
Rehab Medicine
SICU
Trauma Surgery
Trauma Surgery
Neurosurgery
SICU
SICU
Pain Medicine
Neurosurgery

## 2022-11-05 NOTE — H&P ADULT - HISTORY OF PRESENT ILLNESS
Ms. Scott is a 61 yo F who presented to ED s/p mechanical GLF while in her driveway. Patient hit her face on the ground, landing face forward on her arm. Patient denies any LOC. + Headstrike. Not on ASA/AC. She presented to [facility] with complaints of bilateral numbness/tingling in hands, as well as RLE numbness/weakness (she does endorse previous  radiculopathy s/p melanoma excision). CT head/max/face/ A/P negative for any acute traumatic injuries. CT C-spine demonstrates C4/C5 spinal stenosis. Patient was transferred to [facility] for MRI, and further evaluation. On presentation, patient primary intact, GCS 15. HDS. She is noted to have a small mucosal lip laceration s/p repair by EDm sensory defect in bilateral hands. Otherwise, intact. MRI completed, pending final read. NSGY consulted for evaluation.     Hospital Course:    Patient admitted to the acute care surgery/trauma service/SICU with symptoms consistent with central cord syndrome. Neurosurgery consulted and recommended C collar at all times as well as MRI which was performed. Patient had MAP maintained > 85 fo cord perfusion. Patient had some improvement with symptoms. Patient requested second opinion by way of Neurosurgical approach which was requested on patients behalf on 11/3/22. Patient was stable for transition to floor. Rehab services continued to work with patient and recommendation upon discharge for disposition to Acute rehab.     Patient was evaluated by PM&R and therapy for functional deficits and gait/ ADL impairments and recommended acute rehabilitation. Patient was medically optimized for discharge to [facility] on 11/5/22

## 2022-11-05 NOTE — PATIENT PROFILE ADULT - CENTRAL VENOUS CATHETER/PICC LINE
on account of progressively worsening shortness of breath and productive cough. Patient was noted to be hypoxic on presentation, with SPO2 in the 80s. Initial chest x-ray, reporting stable chronic lung changes, pulmonary hypoventilation with no acute infiltrate or evidence of CHF. CTA, reports no evidence of acute PE. However reported patchy nodular areas of infiltrates in the right upper lobe and middle lobe, likely infectious etiology. CTA further reported a new mediastinal and hilar lymphadenopathy (infectious versus neoplastic lymph node involvement) compared to previous study. Review of Systems:   Review of Systems  ROS: 14 point review of systems is negative except as specifically addressed above. DIET CARDIAC;     Intake/Output Summary (Last 24 hours) at 2/8/2020 1732  Last data filed at 2/8/2020 0855  Gross per 24 hour   Intake 10 ml   Output --   Net 10 ml       Medications:  Current Facility-Administered Medications   Medication Dose Route Frequency Provider Last Rate Last Dose    [START ON 2/9/2020] methylPREDNISolone sodium (SOLU-MEDROL) injection 60 mg  60 mg Intravenous Q12H Daniel Alfaro MD        cefTRIAXone (ROCEPHIN) 1 g in sterile water 10 mL IV syringe  1 g Intravenous Q24H Marlon Dover MD   1 g at 02/08/20 1334    azithromycin (ZITHROMAX) 250 mg in dextrose 5 % 250 mL IVPB  250 mg Intravenous Q24H Marlon Dover MD   Stopped at 02/08/20 1434    sodium chloride flush 0.9 % injection 10 mL  10 mL Intravenous 2 times per day Marlon Dover MD   10 mL at 02/08/20 0855    sodium chloride flush 0.9 % injection 10 mL  10 mL Intravenous PRN Marlon Dover MD   5 mL at 02/07/20 0139    enoxaparin (LOVENOX) injection 40 mg  40 mg Subcutaneous Q24H Marlon Dover MD   40 mg at 02/07/20 2000    polyethylene glycol (GLYCOLAX) packet 17 g  17 g Oral Daily PRN Marlon Dover MD        ipratropium-albuterol (DUONEB) nebulizer solution 1 ampule  1 ampule Inhalation Q4H PRN Yen Deleon MD control was utilized. TECHNIQUE: Spiral CT angiography was performed of the chest with contrast. Sagittal, coronal and 3-D images were reconstructed. MEDIASTINUM/VASCULAR: There is atheromatous disease of the thoracic aorta. There is no CT evidence of pulmonary embolus. The heart is normal in size. There is mediastinal lymphadenopathy. A subcarinal lymph node has a short axis diameter of 1.9 cm. Several other lymph nodes are noted in the mediastinum with a couple measuring up to about 1.1 cm in short axis diameter. There are small right hilar lymph nodes. LUNGS: A scarlike area in the right upper lobe anteriorly remains stable. There are some clustered nodules in the right upper lobe medially on images 37 through 39. There is also a clustering of right middle lobe nodules predominantly on image 42. There is paraseptal emphysema. There is probable dependent atelectasis in the lung bases. BONES/SOFT TISSUES/: There are degenerative changes of the spine and shoulders. UPPER ABDOMEN: There is fatty infiltration of the liver. There is fatty infiltration of the pancreas. There is a probable large cyst arising from the upper pole left kidney measuring 5.5 cm.    1. Scarring in the right upper lobe anteriorly. This is stable. 2. Patchy nodular areas of infiltrate in the right upper lobe and right middle lobe likely representing infectious disease. 3. There is new mediastinal and hilar lymphadenopathy compared to the prior study. These findings are probably related to infection. Neoplastic lymph node involvement is not entirely ruled out. Follow-up recommended. 4. Fatty infiltration of the liver and pancreas. Probable benign cyst arising from the upper pole left kidney measuring 5.5 cm not fully characterized on this study. It appears relatively stable compared to 1/17/2019. The full report of this exam was immediately signed and available to the emergency room. The patient is currently in the emergency room.  Signed by  deformity or signs of injury. Right lower leg: No edema. Left lower leg: No edema. Skin:     General: Skin is warm and dry. Capillary Refill: Capillary refill takes less than 2 seconds. Coloration: Skin is not jaundiced or pale. Findings: No bruising, erythema or lesion. Neurological:      General: No focal deficit present. Mental Status: He is alert and oriented to person, place, and time. Cranial Nerves: No cranial nerve deficit. Sensory: No sensory deficit. Motor: No weakness. Coordination: Coordination normal.   Psychiatric:         Mood and Affect: Mood normal.         Behavior: Behavior normal.         Thought Content: Thought content normal.         Judgment: Judgment normal.         Assessment/plan:           Principal Problem:    Acute on chronic respiratory failure with hypoxia and hypercapnia (HCC)  Active Problems:    COPD with acute exacerbation (HCC)    Leukocytosis (leucocytosis)    Hyperlipidemia    Chronic low back pain    Uncontrolled hypertension    Chronic GERD    History of esophageal stricture    Status post dilation of esophageal narrowing    Grade I diastolic dysfunction    Pneumonia    Obesity due to excess calories    Personal history of noncompliance with medical treatment, presenting hazards to health    Sepsis [facility])  Resolved Problems:    * No resolved hospital problems. *          Acute hypoxic and hypercarbic respiratory failure enthesis acute on chronic)  Pneumonia, due to on specified organism.  Presented to the shortness of breath and productive cough    Hypoxemic on initial presentation, with a PaO2 of 46 mmHg on initial ABG   Hypercarbic, with initial PCO2 of 54 mmHg on initial ABG.  Rapid influenza a and B antigen negative   proBNP within lab reference range   -  Afebrile   -  Leukocytosis (WBC: 23.7)   - Initial Chest x-ray reporting \"Pulmonary hypoventilation with no acute infiltrates or evidence of CHF.  Stable no

## 2022-11-05 NOTE — PATIENT PROFILE ADULT - FALL HARM RISK - HARM RISK INTERVENTIONS
Assistance with ambulation/Assistance OOB with selected safe patient handling equipment/Communicate Risk of Fall with Harm to all staff/Discuss with provider need for PT consult/Monitor gait and stability/Reinforce activity limits and safety measures with patient and family/Tailored Fall Risk Interventions/Visual Cue: Yellow wristband and red socks/Bed in lowest position, wheels locked, appropriate side rails in place/Call bell, personal items and telephone in reach/Instruct patient to call for assistance before getting out of bed or chair/Non-slip footwear when patient is out of bed/[place] to call system/Physically safe environment - no spills, clutter or unnecessary equipment/Purposeful Proactive Rounding/Room/bathroom lighting operational, light cord in reach

## 2022-11-05 NOTE — H&P ADULT - NSHPSOCIALHISTORY_GEN_ALL_CORE
SOCIAL HISTORY  Lives with  in a house, 9 steps to enter, 18 steps to bedroom, but can stay on the first floor  Smoking - None  EtOH - None  Marital Status -      FUNCTIONAL HISTORY  Previous Functional Status:   Independent in ambulation, ADL's, transfers prior to hospitalization    Current Functional Status:  Bed mobility: Min Assist   Transfers: Min Assist  Gait / ambulation: Only bed to chair   ADL's: Mod Assist

## 2022-11-05 NOTE — H&P ADULT - NSHPPHYSICALEXAM_GEN_ALL_CORE
Constitutional - NAD, Comfortable  HEENT - NCAT, EOMI  Neck - Supple, No limited ROM  Chest - good chest expansion, good respiratory effort, CTAB   Cardio - warm and well perfused, RRR, no murmur  Abdomen -  Soft, NTND  Extremities - No peripheral edema, No calf tenderness   Neurologic Exam:                    Cognitive -             Orientation: Awake, Alert, AAO to self, place, date, year, situation            Attention:  Days of week, recall 3 objects without cuing            Memory: Recent/ remote memory intact            Thought: process, content appropriate     Speech - Fluent, Comprehensible, No dysarthria, No aphasia      Cranial Nerves - No facial asymmetry, Tongue midline, EOMI, Shoulder shrug intact     Motor -                      LEFT    UE - ShAB 5/5, EF 5/5, EE 5/5, WE 5/5,  WNL                    RIGHT UE - ShAB 5/5, EF 5/5, EE 5/5, WE 5/5,  WNL                    LEFT    LE - HF 5/5, KE 5/5, DF 5/5, PF 5/5                    RIGHT LE - HF 5/5, KE 5/5, DF 5/5, PF 5/5        Sensory - Intact to LT bilateral, allodynia pain in C6 dermatome,     Reflexes - DTR intact     Coordination - FTN intact     OculoVestibular -  No nystagmus  Psychiatric - Mood stable, Affect WNL  Skin on admission: slight skin irritation in back (allergic reaction)

## 2022-11-05 NOTE — PATIENT PROFILE ADULT - FUNCTIONAL ASSESSMENT - DAILY ACTIVITY 5.
Detail Level: Detailed Detail Level: Generalized Detail Level: Zone 4 = No assist / stand by assistance

## 2022-11-06 LAB
ALBUMIN SERPL ELPH-MCNC: 3 G/DL — LOW (ref 3.3–5)
ALP SERPL-CCNC: 94 U/L — SIGNIFICANT CHANGE UP (ref 40–120)
ALT FLD-CCNC: 108 U/L — HIGH (ref 10–45)
ANION GAP SERPL CALC-SCNC: 6 MMOL/L — SIGNIFICANT CHANGE UP (ref 5–17)
AST SERPL-CCNC: 84 U/L — HIGH (ref 10–40)
BASOPHILS # BLD AUTO: 0.03 K/UL — SIGNIFICANT CHANGE UP (ref 0–0.2)
BASOPHILS NFR BLD AUTO: 0.5 % — SIGNIFICANT CHANGE UP (ref 0–2)
BILIRUB SERPL-MCNC: 0.4 MG/DL — SIGNIFICANT CHANGE UP (ref 0.2–1.2)
BUN SERPL-MCNC: 16 MG/DL — SIGNIFICANT CHANGE UP (ref 7–23)
CALCIUM SERPL-MCNC: 8.8 MG/DL — SIGNIFICANT CHANGE UP (ref 8.4–10.5)
CHLORIDE SERPL-SCNC: 107 MMOL/L — SIGNIFICANT CHANGE UP (ref 96–108)
CO2 SERPL-SCNC: 29 MMOL/L — SIGNIFICANT CHANGE UP (ref 22–31)
CREAT SERPL-MCNC: 0.87 MG/DL — SIGNIFICANT CHANGE UP (ref 0.5–1.3)
EGFR: 75 ML/MIN/1.73M2 — SIGNIFICANT CHANGE UP
EOSINOPHIL # BLD AUTO: 0.23 K/UL — SIGNIFICANT CHANGE UP (ref 0–0.5)
EOSINOPHIL NFR BLD AUTO: 4.2 % — SIGNIFICANT CHANGE UP (ref 0–6)
GLUCOSE SERPL-MCNC: 82 MG/DL — SIGNIFICANT CHANGE UP (ref 70–99)
HCT VFR BLD CALC: 38 % — SIGNIFICANT CHANGE UP (ref 34.5–45)
HCV AB S/CO SERPL IA: 0.09 S/CO — SIGNIFICANT CHANGE UP (ref 0–0.99)
HCV AB SERPL-IMP: SIGNIFICANT CHANGE UP
HGB BLD-MCNC: 12.5 G/DL — SIGNIFICANT CHANGE UP (ref 11.5–15.5)
IMM GRANULOCYTES NFR BLD AUTO: 0.4 % — SIGNIFICANT CHANGE UP (ref 0–0.9)
LYMPHOCYTES # BLD AUTO: 2.02 K/UL — SIGNIFICANT CHANGE UP (ref 1–3.3)
LYMPHOCYTES # BLD AUTO: 36.6 % — SIGNIFICANT CHANGE UP (ref 13–44)
MCHC RBC-ENTMCNC: 30.9 PG — SIGNIFICANT CHANGE UP (ref 27–34)
MCHC RBC-ENTMCNC: 32.9 GM/DL — SIGNIFICANT CHANGE UP (ref 32–36)
MCV RBC AUTO: 93.8 FL — SIGNIFICANT CHANGE UP (ref 80–100)
MONOCYTES # BLD AUTO: 0.36 K/UL — SIGNIFICANT CHANGE UP (ref 0–0.9)
MONOCYTES NFR BLD AUTO: 6.5 % — SIGNIFICANT CHANGE UP (ref 2–14)
NEUTROPHILS # BLD AUTO: 2.86 K/UL — SIGNIFICANT CHANGE UP (ref 1.8–7.4)
NEUTROPHILS NFR BLD AUTO: 51.8 % — SIGNIFICANT CHANGE UP (ref 43–77)
NRBC # BLD: 0 /100 WBCS — SIGNIFICANT CHANGE UP (ref 0–0)
PLATELET # BLD AUTO: 266 K/UL — SIGNIFICANT CHANGE UP (ref 150–400)
POTASSIUM SERPL-MCNC: 4.4 MMOL/L — SIGNIFICANT CHANGE UP (ref 3.5–5.3)
POTASSIUM SERPL-SCNC: 4.4 MMOL/L — SIGNIFICANT CHANGE UP (ref 3.5–5.3)
PROT SERPL-MCNC: 6.1 G/DL — SIGNIFICANT CHANGE UP (ref 6–8.3)
RBC # BLD: 4.05 M/UL — SIGNIFICANT CHANGE UP (ref 3.8–5.2)
RBC # FLD: 13.4 % — SIGNIFICANT CHANGE UP (ref 10.3–14.5)
SODIUM SERPL-SCNC: 142 MMOL/L — SIGNIFICANT CHANGE UP (ref 135–145)
WBC # BLD: 5.52 K/UL — SIGNIFICANT CHANGE UP (ref 3.8–10.5)
WBC # FLD AUTO: 5.52 K/UL — SIGNIFICANT CHANGE UP (ref 3.8–10.5)

## 2022-11-06 PROCEDURE — 99223 1ST HOSP IP/OBS HIGH 75: CPT

## 2022-11-06 RX ORDER — HYDROCORTISONE 1 %
1 OINTMENT (GRAM) TOPICAL
Refills: 0 | Status: DISCONTINUED | OUTPATIENT
Start: 2022-11-06 | End: 2022-11-10

## 2022-11-06 RX ORDER — PETROLATUM,WHITE
1 JELLY (GRAM) TOPICAL THREE TIMES A DAY
Refills: 0 | Status: DISCONTINUED | OUTPATIENT
Start: 2022-11-06 | End: 2022-11-07

## 2022-11-06 RX ORDER — MUPIROCIN 20 MG/G
1 OINTMENT TOPICAL
Refills: 0 | Status: DISCONTINUED | OUTPATIENT
Start: 2022-11-06 | End: 2022-11-12

## 2022-11-06 RX ORDER — METHOCARBAMOL 500 MG/1
750 TABLET, FILM COATED ORAL EVERY 8 HOURS
Refills: 0 | Status: DISCONTINUED | OUTPATIENT
Start: 2022-11-06 | End: 2022-11-12

## 2022-11-06 RX ADMIN — Medication 1 APPLICATION(S): at 17:55

## 2022-11-06 RX ADMIN — METHOCARBAMOL 750 MILLIGRAM(S): 500 TABLET, FILM COATED ORAL at 13:06

## 2022-11-06 RX ADMIN — ENOXAPARIN SODIUM 40 MILLIGRAM(S): 100 INJECTION SUBCUTANEOUS at 21:13

## 2022-11-06 RX ADMIN — GABAPENTIN 300 MILLIGRAM(S): 400 CAPSULE ORAL at 05:51

## 2022-11-06 RX ADMIN — METHOCARBAMOL 750 MILLIGRAM(S): 500 TABLET, FILM COATED ORAL at 21:13

## 2022-11-06 RX ADMIN — Medication 1 APPLICATION(S): at 05:51

## 2022-11-06 RX ADMIN — MUPIROCIN 1 APPLICATION(S): 20 OINTMENT TOPICAL at 17:55

## 2022-11-06 RX ADMIN — GABAPENTIN 300 MILLIGRAM(S): 400 CAPSULE ORAL at 21:16

## 2022-11-06 RX ADMIN — Medication 1 APPLICATION(S): at 21:18

## 2022-11-06 RX ADMIN — GABAPENTIN 300 MILLIGRAM(S): 400 CAPSULE ORAL at 13:06

## 2022-11-06 NOTE — CONSULT NOTE ADULT - SUBJECTIVE AND OBJECTIVE BOX
Patient is a 62y old  Female who presents with a chief complaint of weakness.    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 Patient hit her face on the ground, landing face forward on her arm. Patient denies any LOC. +Headstrike. Not on ASA/AC. She presented to [facility] with complaints of bilateral numbness/tingling in hands, as well as RLE numbness/weakness (she does endorse previous  radiculopathy s/p melanoma excision). CT head/max/face/A/P negative for any acute traumatic injuries. CT C-spine demonstrates C4/C5 spinal stenosis. Patient was transferred to [facility] for MRI, and further evaluation. On presentation, patient primary intact, GCS 15. HDS. She is noted to have a small mucosal lip laceration s/p repair by ED sensory defect in bilateral hands. Otherwise, intact. MRI completed, pending final read. NSGY consulted for evaluation.     Hospital Course:  Patient admitted to the acute care surgery/trauma service/SICU with symptoms consistent with central cord syndrome. Neurosurgery consulted and recommended C collar at all times as well as MRI which was performed. Patient had MAP maintained > 85 fo cord perfusion. Patient had some improvement with symptoms. Patient requested second opinion by way of Neurosurgical approach which was requested on patients behalf on 11/3/22. Patient was stable for transition to floor. Rehab services continued to work with patient and recommendation upon discharge for disposition to Acute rehab.   Patient was evaluated by PM&R and therapy for functional deficits and gait/ ADL impairments and recommended acute rehabilitation. Patient was medically optimized for discharge to [facility] on 22. (2022 09:35)    Patient seen and examined at bedside, stable, NAD. b/l UE numbness improving, now only prominent in fingers. +neck msk discomfort from c-collar, requesting to continue robaxin. +diffuse back pruritus, likely from sheets. denies headache, fever, chills, cp, sob, n/v, abd pain.     PAST MEDICAL & SURGICAL HISTORY:    SOCIAL HISTORY: Denies tobacco, EtOH, or illicit drug use. 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 PTA independent in ADLs, IADLs, and ambulation.     FAMILY HISTORY: mother -  age 82 PMH Alzheimers. father -  age 53 - PMH Glioblastoma    ALLERGIES:  Cilantro (Unknown)  Demerol (Unknown)    MEDICATIONS  (STANDING):  BACItracin   Ointment 1 Application(s) Topical two times a day  enoxaparin Injectable 40 milliGRAM(s) SubCutaneous every 24 hours  gabapentin 300 milliGRAM(s) Oral three times a day  hydrocortisone 1% Cream 1 Application(s) Topical two times a day  methocarbamol 750 milliGRAM(s) Oral every 8 hours    MEDICATIONS  (PRN):  polyethylene glycol 3350 17 Gram(s) Oral two times a day PRN Constipation  traMADol 25 milliGRAM(s) Oral every 4 hours PRN Moderate Pain (4 - 6)  traMADol 50 milliGRAM(s) Oral every 4 hours PRN Severe Pain (7 - 10)    Review of Systems: Refer to HPI for pertinent positives and negatives. All other ROS reviewed and negative except as otherwise stated above.    Vital Signs Last 24 Hrs  T(F): 98.8 (2022 09:31), Max: 98.8 (2022 09:31)  HR: 64 (2022 09:31) (62 - 66)  BP: 129/83 (2022 09:31) (111/67 - 129/83)  RR: 18 (2022 09:31) (17 - 18)  SpO2: 96% (2022 09:31) (96% - 96%)  I&O's Summary    PHYSICAL EXAM:  GENERAL: NAD, well-groomed, well-developed  HEAD:  Atraumatic, Normocephalic. +c-collar  EYES: EOMI, PERRL, conjunctiva and sclera clear  ENMT: Moist mucous membranes, Good dentition. +lip laceration, nasal abrasion, healing  NECK: Supple, No JVD  CHEST/LUNG: Clear to auscultation bilaterally, non-labored breathing, good air entry  HEART: RRR; S1/S2  ABDOMEN: Soft, Nontender, Nondistended; Bowel sounds present  VASCULAR: Normal pulses, Normal capillary refill  EXTREMITIES: No cyanosis. +trace LE edema b/l  LYMPH: No lymphadenopathy noted  SKIN: Warm, Intact  PSYCH: Normal mood and affect  NERVOUS SYSTEM:  A/O x3, Good concentration; No focal deficits, moves all extremities    LABS:                        12.5   5.52  )-----------( 266      ( 2022 06:00 )             38.0         142  |  107  |  16  ----------------------------<  82  4.4   |  29  |  0.87    Ca    8.8      2022 06:00    TPro  6.1  /  Alb  3.0  /  TBili  0.4  /  DBili  x   /  AST  84  /  ALT  108  /  AlkPhos  94        COVID-19 PCR: NotDetec (22 @ 19:00)    RADIOLOGY & ADDITIONAL TESTS: reviewed    Care Discussed with Consultants/Other Providers: yes, rehab

## 2022-11-06 NOTE — CONSULT NOTE ADULT - ASSESSMENT
61 y/o F with PMH melanoma excision with right arm radiculopathy, who transferred [facility] on 10/30/22 s/p mechanical fall while in her driveway. MRI c/w cord compression at C3/C4 and C4/C5 levels. Admitted for multidisciplinary rehab program for functional deficits pertaining to Central Cord Syndrome.    #Central Cord Syndrome  #s/p Fall  #Neck spasms  -Maintain C-Collar per neurosurgery rec  -Continue comprehensive rehab program - PT/OT/SLP per rehab team  -Pain management, bowel regimen per rehab   -Monitor BP - per pt, noted with hypotension    #Pruritis  -Hypoallergenic sheets  -Trial hydrocortisone, aquaphor  -If unimproved can trial short course of Claritin qhs     #Elevated LFTs  -Monitor, encourage PO hydration, limit hepatotoxins  -If worsening, consider RUQ sono    #DVT ppx - Lovenox

## 2022-11-07 LAB
ALBUMIN SERPL ELPH-MCNC: 3 G/DL — LOW (ref 3.3–5)
ALP SERPL-CCNC: 90 U/L — SIGNIFICANT CHANGE UP (ref 40–120)
ALT FLD-CCNC: 114 U/L — HIGH (ref 10–45)
ANION GAP SERPL CALC-SCNC: 10 MMOL/L — SIGNIFICANT CHANGE UP (ref 5–17)
AST SERPL-CCNC: 72 U/L — HIGH (ref 10–40)
BILIRUB SERPL-MCNC: 0.5 MG/DL — SIGNIFICANT CHANGE UP (ref 0.2–1.2)
BUN SERPL-MCNC: 12 MG/DL — SIGNIFICANT CHANGE UP (ref 7–23)
CALCIUM SERPL-MCNC: 9.1 MG/DL — SIGNIFICANT CHANGE UP (ref 8.4–10.5)
CHLORIDE SERPL-SCNC: 109 MMOL/L — HIGH (ref 96–108)
CO2 SERPL-SCNC: 25 MMOL/L — SIGNIFICANT CHANGE UP (ref 22–31)
CREAT SERPL-MCNC: 0.75 MG/DL — SIGNIFICANT CHANGE UP (ref 0.5–1.3)
EGFR: 89 ML/MIN/1.73M2 — SIGNIFICANT CHANGE UP
GLUCOSE SERPL-MCNC: 83 MG/DL — SIGNIFICANT CHANGE UP (ref 70–99)
HCT VFR BLD CALC: 37.3 % — SIGNIFICANT CHANGE UP (ref 34.5–45)
HGB BLD-MCNC: 12.4 G/DL — SIGNIFICANT CHANGE UP (ref 11.5–15.5)
MCHC RBC-ENTMCNC: 30.8 PG — SIGNIFICANT CHANGE UP (ref 27–34)
MCHC RBC-ENTMCNC: 33.2 GM/DL — SIGNIFICANT CHANGE UP (ref 32–36)
MCV RBC AUTO: 92.8 FL — SIGNIFICANT CHANGE UP (ref 80–100)
NRBC # BLD: 0 /100 WBCS — SIGNIFICANT CHANGE UP (ref 0–0)
PLATELET # BLD AUTO: 275 K/UL — SIGNIFICANT CHANGE UP (ref 150–400)
POTASSIUM SERPL-MCNC: 4.1 MMOL/L — SIGNIFICANT CHANGE UP (ref 3.5–5.3)
POTASSIUM SERPL-SCNC: 4.1 MMOL/L — SIGNIFICANT CHANGE UP (ref 3.5–5.3)
PROT SERPL-MCNC: 6.1 G/DL — SIGNIFICANT CHANGE UP (ref 6–8.3)
RBC # BLD: 4.02 M/UL — SIGNIFICANT CHANGE UP (ref 3.8–5.2)
RBC # FLD: 13.3 % — SIGNIFICANT CHANGE UP (ref 10.3–14.5)
SODIUM SERPL-SCNC: 144 MMOL/L — SIGNIFICANT CHANGE UP (ref 135–145)
WBC # BLD: 5.58 K/UL — SIGNIFICANT CHANGE UP (ref 3.8–10.5)
WBC # FLD AUTO: 5.58 K/UL — SIGNIFICANT CHANGE UP (ref 3.8–10.5)

## 2022-11-07 PROCEDURE — 99233 SBSQ HOSP IP/OBS HIGH 50: CPT

## 2022-11-07 PROCEDURE — 99232 SBSQ HOSP IP/OBS MODERATE 35: CPT

## 2022-11-07 RX ORDER — GABAPENTIN 400 MG/1
400 CAPSULE ORAL THREE TIMES A DAY
Refills: 0 | Status: DISCONTINUED | OUTPATIENT
Start: 2022-11-07 | End: 2022-11-12

## 2022-11-07 RX ADMIN — Medication 1 APPLICATION(S): at 05:23

## 2022-11-07 RX ADMIN — METHOCARBAMOL 750 MILLIGRAM(S): 500 TABLET, FILM COATED ORAL at 17:49

## 2022-11-07 RX ADMIN — MUPIROCIN 1 APPLICATION(S): 20 OINTMENT TOPICAL at 17:52

## 2022-11-07 RX ADMIN — GABAPENTIN 300 MILLIGRAM(S): 400 CAPSULE ORAL at 05:22

## 2022-11-07 RX ADMIN — GABAPENTIN 400 MILLIGRAM(S): 400 CAPSULE ORAL at 21:03

## 2022-11-07 RX ADMIN — ENOXAPARIN SODIUM 40 MILLIGRAM(S): 100 INJECTION SUBCUTANEOUS at 21:03

## 2022-11-07 RX ADMIN — METHOCARBAMOL 750 MILLIGRAM(S): 500 TABLET, FILM COATED ORAL at 05:23

## 2022-11-07 RX ADMIN — MUPIROCIN 1 APPLICATION(S): 20 OINTMENT TOPICAL at 05:23

## 2022-11-07 RX ADMIN — METHOCARBAMOL 750 MILLIGRAM(S): 500 TABLET, FILM COATED ORAL at 21:04

## 2022-11-07 RX ADMIN — GABAPENTIN 400 MILLIGRAM(S): 400 CAPSULE ORAL at 17:49

## 2022-11-07 NOTE — DIETITIAN INITIAL EVALUATION ADULT - FACTORS AFF FOOD INTAKE
States Good PO Intake/Appetite over last Week  Previously Had Decreased PO Intake @ Previous Facility (Per Patient)/none

## 2022-11-07 NOTE — CHART NOTE - NSCHARTNOTEFT_GEN_A_CORE
[facility] Interdisciplinary Plan of Care    REHABILITATION DIAGNOSIS:  Central cord syndrome due to cervical spine stenosis           COMORBIDITIES/COMPLICATING CONDITIONS IMPACTING REHABILITATION:  HEALTH ISSUES - PROBLEM Dx:        PAST MEDICAL & SURGICAL HISTORY:      Based upon consideration of the patient's impairments, functional status, complicating conditions and any other contributing factors and after information garnered from the assessments of all therapy disciplines involved in treating the patient and other pertinent clinicians:    INTERDISCIPLINARY REHABILITATION INTERVENTIONS:    [ X  ] Transfer Training  [ X  ] Bed Mobility  [ X  ] Therapeutic Exercise  [ X ] Balance/Coordination Exercises  [ X ] Locomotion retraining  [ X  ] Stairs  [  X ] Functional Transfer Training  [   ] Bowel/Bladder program  [   ] Pain Management  [   ] Skin/Wound Care  [   ] Visual/Perceptual Training  [   ] Therapeutic Recreation Activities  [   ] Neuromuscular Re-education  [ X  ] Activities of Daily Living  [   ] Speech Exercise  [   ] Swallowing Exercises  [   ] Vital Stim  [   ] Dietary Supplements  [   ] Calorie Count  [   ] Cognitive Exercises  [   ] Congnitive/Linguistic Treatment  [   ] Behavior Program  [   ] Neuropsych Therapy  [ X  ] Patient/Family Counseling  [ X ] Family Training  [ X  ] Community Re-entry  [   ] Orthotic Evaluation  [   ] Prosthetic Eval/Training    MEDICAL PROGNOSIS:  Good     REHAB POTENTIAL:  Good   EXPECTED DAILY THERAPY:         PT: 1.5 hr        OT: 1.5 hr        ST: n/a        P&O: C collar to continue     EXPECTED INTENSITY OF PROGRAM:  3 hrs / Day    EXPECTED FREQUENCY OF PROGRAM: 5 Days/ Week    ESTIMATED LOS:  [  x] 5-7 Days  [  ] 7-10 Days  [  ] 10- 14 Days  [  ] 14- 18 Days  [  ] 18- 21 Days    ESTIMATED DISPOSITION:  [  ] Home   [ x ] Home with Outpatient Therapies  [  ] Home with Home Therapies  [  ] Assisted Living  [  ] Nursing Home  [  ] Long Term Acute Care    INTERDISCIPLINARY FUNCTIONAL OUTCOMES/GOALS:         Gait/Mobility: min  with gait device        Transfers:min a       ADLs: min a       Functional Transfers: min a        Medication Management: min a       Communication: min a       Cognitive: junito a       Dysphagia: will be evaluated        Bladderwill be evaluated        Bowel: will be evaluated     Functional Independent Measures: 6  7 = Independent  6 = Modified Independent  5 = Supervision  4 = Minimal Assist/ Contact Guard  3 = Moderate Assistance  2 = Maximum Assistance  1 = Total Assistance  0 = Unable to assess

## 2022-11-07 NOTE — PROGRESS NOTE ADULT - ASSESSMENT
Ms. Scott is a 63 yo F who transferred [facility] on 10/30/22 s/p mechanical fall while in her driveway for MRI which which showed cord compression at C3/C4 and C4/C5 levels. Admitted for multidisciplinary rehab program for functional deficits pertaining to Central Cord Syndrome      * c/w steroid cream to rash on upper back, d/c aquafor,  * continue Robaxin  * UE allodynia and weakness(distal)--continue therapy, will consider neuropsych eval if persistent    #Central Cord Syndrome (C spine stenosis)  Commence PT/OT 3 hours a day 5 days a week  C/w C-Collar   Limit narcotics given history of delirium     #Mood / Cognition:  - Neuropsych evaluation     #Sleep:  - Maintain quiet hours and low stim environment    # Allergic rash upper back --* c/w steroid cream to rash on upper back, d/c aquafor,  #Pain:  -c/w Robaxin 750mg PO Q8  -c/w Gabapentin 300mg PO TID   -c/w Toradol IV 15mg TID   -c/w tramadol 25mg PO q4 for moderate pain PRN   -c/w tramadol 50mg PO q4 for severe pain PRN   - avoid sedating meds that may affect cognitive recovery    #GI/Bowel:  - Senna 2 tabs daily  - GI ppx: Miralax 17g PO BID PRN for constipation     #/Bladder:--voiding appropriately    #Diet / Dysphagia:    - Diet: Regular Diet   - Nutrition to follow    #Skin/ Pressure Injury Prevention:  -bacitracin for skin abrasions on face   - assessment on admission   - Incisions:  - Turn Q2hrs in bed while awake, OOB to Chair, PT/OT/SLP     #DVT prophylaxis: Lovenox 40mg SQ QD  - SCDs  - Last doppler unknown     #Precautions/ Restrictions  - Falls, Spine  - Ortho:      Weight bearing status: Weight bearing as tolerated   - COVID PCR: COVID Neg 11/4/22    --------------------------------------------  Outpatient Follow up:    Sivakumar Mays)  Neurosurgery  [address]  [place] [address]  Phone: [phone]  Fax: [phone]  Follow Up Time:    --------------------------------------------      MEDICAL PROGNOSIS: GOOD            REHAB POTENTIAL: GOOD             ESTIMATED DISPOSITION: HOME WITH HOME CARE            ELOS: 10-14 Days   EXPECTED THERAPY:     P.T. 1hr/day       O.T. 1hr/day      S.L.P. 1hr/day     P&O Unnecessary     EXP FREQUENCY: 5 days per 7 day period     PRESCREEN COMPARISON:   I have reviewed the prescreen information and I have found no relevant changes between the preadmission screening and my post admission evaluation     RATIONALE FOR INPATIENT ADMISSION - Patient demonstrates the following: (check all that apply)  [X] Medically appropriate for rehabilitation admission  [X] Has attainable rehab goals with an appropriate initial discharge plan  [X] Has rehabilitation potential (expected to make a significant improvement within a reasonable period of time)   [X] Requires close medical management by a rehab physician, rehab nursing care, Hospitalist and comprehensive interdisciplinary team (including PT, OT, & or SLP, Prosthetics and Orthotics)

## 2022-11-07 NOTE — PROGRESS NOTE ADULT - SUBJECTIVE AND OBJECTIVE BOX
Subjective  Seen and examined  Reports minimal improvement of rash on upper back, slightly itchy  No vesicles and no rash on other body areas  UE neuropathic symptoms--slightly reduced intensity of tingling/numbness    ROS--No head ache, N/V Dyspnea or abd pain  Allodynia both thumb/hands  Weakness of hands  LBM 11/7    ICU Vital Signs Last 24 Hrs  T(C): 37.1 (07 Nov 2022 09:12), Max: 37.1 (07 Nov 2022 09:12)  T(F): 98.7 (07 Nov 2022 09:12), Max: 98.7 (07 Nov 2022 09:12)  HR: 73 (07 Nov 2022 09:12) (70 - 73)  BP: 142/77 (07 Nov 2022 09:12) (115/71 - 142/77)    RR: 17 (07 Nov 2022 09:12) (16 - 17)  SpO2: 96% (07 Nov 2022 09:12) (96% - 96%)    O2 Parameters below as of 07 Nov 2022 09:12  Patient On (Oxygen Delivery Method): room air    Physical Exam:   Constitutional - NAD, Comfortable  HEENT - NAD  Neck - Supple, No limited ROM  Chest - Clear   Cardio - warm and well perfused, RRR, no murmur  Abdomen -  Soft, non tender  Extremities - No peripheral edema, No calf tenderness     Neurologic Exam:                 No cognitive deficits  Cranial Nerves - No facial asymmetry, Tongue midline, EOMI, Shoulder shrug intact  Motor -  LE 5/5 UE 3+/5 finger flexion/wrist extension, has anticipatory fear of aggravatiing pain symptoms     Sensory - Intact to LT bilateral, allodynia pain in C6 dermatome,     Reflexes - DTR intact     Coordination - FTN intact     OculoVestibular -  No nystagmus  Psychiatric - Mood stable, Affect WNL  Skin--erythematous rash upper back, no vesicles     RECENT LABS/IMAGING                        12.4   5.58  )-----------( 275      ( 07 Nov 2022 06:26 )             37.3     11-07    144  |  109<H>  |  12  ----------------------------<  83  4.1   |  25  |  0.75    Ca    9.1      07 Nov 2022 06:26    TPro  6.1  /  Alb  3.0<L>  /  TBili  0.5  /  DBili  x   /  AST  72<H>  /  ALT  114<H>  /  AlkPhos  90  11-07    MEDICATIONS  (STANDING):  AQUAPHOR (petrolatum Ointment) 1 Application(s) Topical three times a day  enoxaparin Injectable 40 milliGRAM(s) SubCutaneous every 24 hours  gabapentin 400 milliGRAM(s) Oral three times a day  hydrocortisone 1% Cream 1 Application(s) Topical two times a day  methocarbamol 750 milliGRAM(s) Oral every 8 hours  mupirocin 2% Ointment 1 Application(s) Topical two times a day    MEDICATIONS  (PRN):  polyethylene glycol 3350 17 Gram(s) Oral two times a day PRN Constipation  traMADol 25 milliGRAM(s) Oral every 4 hours PRN Moderate Pain (4 - 6)  traMADol 50 milliGRAM(s) Oral every 4 hours PRN Severe Pain (7 - 10)

## 2022-11-07 NOTE — DIETITIAN INITIAL EVALUATION ADULT - OTHER INFO
Initial Nutrition Assessment   62yr Old Female   Denies Food Allergy/Intolerance  Tolerates Diet Well - No Chewing/Swallowing Complications (Per Patient)  Consumed 50-75% of Meals (as Per Documentation)  No Pressure Ulcers (as Per Nursing Flow Sheets)  No Edema Noted (as Per Nursing Flow Sheets)  No Recent Nausea/Vomiting/Diarrhea/Constipation (as Per Patient)  Initial Nutrition Assessment   62yr Old Female   States Cilantro Food Allergy/Intolerance  Tolerates Diet Well - No Chewing/Swallowing Complications (Per Patient)  Consumed 50-75% of Meals (as Per Documentation)  No Pressure Ulcers (as Per Nursing Flow Sheets)  No Edema Noted (as Per Nursing Flow Sheets)  No Recent Nausea/Vomiting/Diarrhea/Constipation (as Per Patient)

## 2022-11-07 NOTE — DIETITIAN INITIAL EVALUATION ADULT - PERTINENT MEDS FT
MEDICATIONS  (STANDING):  enoxaparin Injectable 40 milliGRAM(s) SubCutaneous every 24 hours  gabapentin 400 milliGRAM(s) Oral three times a day  hydrocortisone 1% Cream 1 Application(s) Topical two times a day  methocarbamol 750 milliGRAM(s) Oral every 8 hours  mupirocin 2% Ointment 1 Application(s) Topical two times a day    MEDICATIONS  (PRN):  polyethylene glycol 3350 17 Gram(s) Oral two times a day PRN Constipation  traMADol 25 milliGRAM(s) Oral every 4 hours PRN Moderate Pain (4 - 6)  traMADol 50 milliGRAM(s) Oral every 4 hours PRN Severe Pain (7 - 10)

## 2022-11-07 NOTE — DIETITIAN INITIAL EVALUATION ADULT - PERTINENT LABORATORY DATA
11-07    144  |  109<H>  |  12  ----------------------------<  83  4.1   |  25  |  0.75    Ca    9.1      07 Nov 2022 06:26    TPro  6.1  /  Alb  3.0<L>  /  TBili  0.5  /  DBili  x   /  AST  72<H>  /  ALT  114<H>  /  AlkPhos  90  11-07

## 2022-11-07 NOTE — PROGRESS NOTE ADULT - SUBJECTIVE AND OBJECTIVE BOX
Patient is a 62y old  Female who presents with a chief complaint of     SUBJECTIVE / OVERNIGHT EVENTS:  Pt seen and examined at bedside. No acute events overnight. Complaints of numbness/tingling in b/l index fingers  Pt denies cp, palpitations, sob, abd pain, N/V, fever, chills.    ROS:  All other review of systems negative    Allergies    Cilantro (Unknown)  Demerol (Unknown)    Intolerances        MEDICATIONS  (STANDING):  AQUAPHOR (petrolatum Ointment) 1 Application(s) Topical three times a day  enoxaparin Injectable 40 milliGRAM(s) SubCutaneous every 24 hours  gabapentin 300 milliGRAM(s) Oral three times a day  hydrocortisone 1% Cream 1 Application(s) Topical two times a day  methocarbamol 750 milliGRAM(s) Oral every 8 hours  mupirocin 2% Ointment 1 Application(s) Topical two times a day    MEDICATIONS  (PRN):  polyethylene glycol 3350 17 Gram(s) Oral two times a day PRN Constipation  traMADol 25 milliGRAM(s) Oral every 4 hours PRN Moderate Pain (4 - 6)  traMADol 50 milliGRAM(s) Oral every 4 hours PRN Severe Pain (7 - 10)      Vital Signs Last 24 Hrs  T(C): 37.1 (07 Nov 2022 09:12), Max: 37.1 (07 Nov 2022 09:12)  T(F): 98.7 (07 Nov 2022 09:12), Max: 98.7 (07 Nov 2022 09:12)  HR: 73 (07 Nov 2022 09:12) (70 - 73)  BP: 142/77 (07 Nov 2022 09:12) (115/71 - 142/77)  BP(mean): --  RR: 17 (07 Nov 2022 09:12) (16 - 17)  SpO2: 96% (07 Nov 2022 09:12) (96% - 96%)    Parameters below as of 07 Nov 2022 09:12  Patient On (Oxygen Delivery Method): room air      CAPILLARY BLOOD GLUCOSE        I&O's Summary      PHYSICAL EXAM:  GENERAL: NAD, well-developed female  HEAD:  Atraumatic, Normocephalic  EYES: EOMI, PERRLA, conjunctiva and sclera clear  NECK: C-collar  CHEST/LUNG: Clear to auscultation bilaterally; No wheeze, nonlabored breathing  HEART: Regular rate and rhythm; No murmurs, rubs, or gallops  ABDOMEN: Soft, Nontender, Nondistended; Bowel sounds present  EXTREMITIES:  2+ Peripheral Pulses, No clubbing, cyanosis, or edema  NEUROLOGY: AAOx3, b/l UE weaknesss  PSYCH: calm, appropriate mood    LABS:                        12.4   5.58  )-----------( 275      ( 07 Nov 2022 06:26 )             37.3     11-07    144  |  109<H>  |  12  ----------------------------<  83  4.1   |  25  |  0.75    Ca    9.1      07 Nov 2022 06:26    TPro  6.1  /  Alb  3.0<L>  /  TBili  0.5  /  DBili  x   /  AST  72<H>  /  ALT  114<H>  /  AlkPhos  90  11-07              RADIOLOGY & ADDITIONAL TESTS:  Results Reviewed:   Imaging Personally Reviewed:  Electrocardiogram Personally Reviewed:    COORDINATION OF CARE:  Care Discussed with Consultants/Other Providers [Y/N]:  Prior or Outpatient Records Reviewed [Y/N]:

## 2022-11-07 NOTE — DIETITIAN INITIAL EVALUATION ADULT - ENTER FROM (G/KG)
[FreeTextEntry1] : 8/31/22:\par She reports takin diclofenac 75 mg 6 tabs/day that helps his joint pains. He gets a lot of stiffness in his hands and trouble opening and closing his hands as well. Fingers feel swollen. \par \par Initial visit:\par He reports being diagnosed with psoriasis 1 month ago involving his fingernails and cuticles. [facility]. He was treated with injections in his cuticles and fingernails, and clobetasol cream. Both treatments have helped. His left thumb subungual biopsy suggested hyperkeratosis. He reports joint pains for the last 1 year involving his fingers, wrists, elbows, and knees associated with 30 minutes - 1 hour of AM stiffness helped with movement, how shower, and Advil. Symptoms worsen when he stays still. Reports swelling of his fingers as well. He reports low back pain for over 1 year with 1+ hour of AM  stiffness, better when he moves around and walks, and advil, worse at rest and staying still. Reports heel pain. He has episodes of eye redness, dry eyes, itchiness that can become painful. He uses artificial tears that helps somewhat. Reports abdominal pains for the past few months having bowel movements 1x/daily used to have up to 4-5x/daily. Reports mouth ulcers, dry mouth, fatigue, night sweats. Denies dactylitis, hematochezia.  0.8

## 2022-11-07 NOTE — DIETITIAN INITIAL EVALUATION ADULT - ORAL INTAKE PTA/DIET HISTORY
Patient Does Not Follow Diet @Home  Consumes 2 Meals a Day w/ Occasional Snack  Cooks For Self  & Orders Takeout/Delivery "50/50"  Does Take Protein Supplements @Home

## 2022-11-07 NOTE — PROGRESS NOTE ADULT - ASSESSMENT
63 y/o F with PMH melanoma excision with right arm radiculopathy, who transferred [facility] on 10/30/22 s/p mechanical fall while in her driveway. MRI c/w cord compression at C3/C4 and C4/C5 levels. Admitted for multidisciplinary rehab program for functional deficits pertaining to Central Cord Syndrome.    #Central Cord Syndrome  #s/p Fall  #Neck spasms  #Neuropathy  -Maintain C-Collar per neurosurgery recc  -PT/OT/SLP per rehab team  -Pain management, bowel regimen per rehab   -Increase dose of Gabapentin slowly as previous drastic increase cause pt to become hypotensive and possible clonic movements, per pt. She is okay with slow titration. Will increase to 400mg TID for now and monitor vitals/symptoms    #Elevated LFTs  -Monitor, encourage PO hydration, limit hepatotoxins  -If worsening, consider TIA eddyo    #DVT ppx - Lovenox

## 2022-11-07 NOTE — DIETITIAN INITIAL EVALUATION ADULT - REASON
February 20, 2020      Rachel VERGARA [place]  [address] [place] [address]        Dear Rachel Beth,  These results are within the normal range for you.  Please follow up in the clinic as directed.   Please see below for your test results.    Resulted Orders   H. Pylori Agn Fecal ([employer])   Result Value Ref Range    H Pylori Antigen Negative NEG      Comment:      Negative for Helicobacter pylori antigen by enzyme immunoassay. A negative  result indicates the absence of H. pylori antigen or that the level of antigen  is below the level of detection.     Performed and/or entered by:  [address] [address]       Narrative    Test performed by:  [facility]  [address], [place] [address]       If you have any questions, please call the clinic to make an appointment.    Sincerely,    Deb Wood MD   Nutrition Physical Assessment Not Warranted - No Overt Visual Signs of Muscle Depletion or Fat Loss

## 2022-11-08 PROCEDURE — 99232 SBSQ HOSP IP/OBS MODERATE 35: CPT

## 2022-11-08 RX ORDER — ACETAMINOPHEN 500 MG
650 TABLET ORAL EVERY 6 HOURS
Refills: 0 | Status: DISCONTINUED | OUTPATIENT
Start: 2022-11-08 | End: 2022-11-12

## 2022-11-08 RX ADMIN — Medication 1 APPLICATION(S): at 06:58

## 2022-11-08 RX ADMIN — METHOCARBAMOL 750 MILLIGRAM(S): 500 TABLET, FILM COATED ORAL at 06:57

## 2022-11-08 RX ADMIN — METHOCARBAMOL 750 MILLIGRAM(S): 500 TABLET, FILM COATED ORAL at 20:57

## 2022-11-08 RX ADMIN — MUPIROCIN 1 APPLICATION(S): 20 OINTMENT TOPICAL at 17:45

## 2022-11-08 RX ADMIN — Medication 650 MILLIGRAM(S): at 16:36

## 2022-11-08 RX ADMIN — GABAPENTIN 400 MILLIGRAM(S): 400 CAPSULE ORAL at 20:57

## 2022-11-08 RX ADMIN — GABAPENTIN 400 MILLIGRAM(S): 400 CAPSULE ORAL at 06:57

## 2022-11-08 RX ADMIN — ENOXAPARIN SODIUM 40 MILLIGRAM(S): 100 INJECTION SUBCUTANEOUS at 20:57

## 2022-11-08 RX ADMIN — MUPIROCIN 1 APPLICATION(S): 20 OINTMENT TOPICAL at 06:56

## 2022-11-08 NOTE — PROGRESS NOTE ADULT - SUBJECTIVE AND OBJECTIVE BOX
Subjective  Seen and examined, reports reduced intensity of tingling/numbness of fingers  Allodynia on both hands still present  She agrees that she will need additional time for therapy, contrary to her previous discussion with therapists on possible dc by the end of the week  Rash on upper back improving   Spasms as reducing     Observed in OT--working on UE fine motor activity and ADLs  Still has profound neuropathy at extremity of fingers    ROS--No head ache, N/V Dyspnea or abd pain  Allodynia both thumb/hands  Weakness of hands  LBM 11/8    Vital Signs Last 24 Hrs  T(C): 36.9 (07 Nov 2022 19:39), Max: 36.9 (07 Nov 2022 19:39)  T(F): 98.4 (07 Nov 2022 19:39), Max: 98.4 (07 Nov 2022 19:39)  HR: 75 (08 Nov 2022 09:04) (75 - 75)  BP: 126/84 (08 Nov 2022 09:04) (126/84 - 130/70)  RR: 17 (08 Nov 2022 09:04) (16 - 17)  SpO2: 96% (08 Nov 2022 09:04) (95% - 96%)    O2 Parameters below as of 08 Nov 2022 09:04  Patient On (Oxygen Delivery Method): room air      Physical Exam:   Constitutional - Comfortable  HEENT - NAD  Neck - Supple, No limited ROM  Chest - Clear   Cardio - warm and well perfused, RRR, no murmur  Abdomen -  Soft, non tender  Extremities - No peripheral edema, No calf tenderness     Neurologic Exam:                 No cognitive deficits  Cranial Nerves - No facial asymmetry, Tongue midline, EOMI, Shoulder shrug intact  Allodynia over fingers  Motor -  LE 5/5 UE 3+/5 finger flexion/wrist extension, has anticipatory fear of aggravatiing pain symptoms     Sensory - Intact to LT bilateral, allodynia pain in C6 dermatome,     Reflexes - DTR intact     Coordination - FTN intact     OculoVestibular -  No nystagmus  Psychiatric - Mood stable, Affect WNL  Skin--erythematous rash upper back, no vesicles                           12.4   5.58  )-----------( 275      ( 07 Nov 2022 06:26 )             37.3     11-07    144  |  109<H>  |  12  ----------------------------<  83  4.1   |  25  |  0.75    Ca    9.1      07 Nov 2022 06:26    TPro  6.1  /  Alb  3.0<L>  /  TBili  0.5  /  DBili  x   /  AST  72<H>  /  ALT  114<H>  /  AlkPhos  90  11-07    MEDICATIONS  (STANDING):  enoxaparin Injectable 40 milliGRAM(s) SubCutaneous every 24 hours  gabapentin 400 milliGRAM(s) Oral three times a day  hydrocortisone 1% Cream 1 Application(s) Topical two times a day  methocarbamol 750 milliGRAM(s) Oral every 8 hours  mupirocin 2% Ointment 1 Application(s) Topical two times a day    MEDICATIONS  (PRN):  polyethylene glycol 3350 17 Gram(s) Oral two times a day PRN Constipation  traMADol 25 milliGRAM(s) Oral every 4 hours PRN Moderate Pain (4 - 6)  traMADol 50 milliGRAM(s) Oral every 4 hours PRN Severe Pain (7 - 10)

## 2022-11-08 NOTE — PROGRESS NOTE ADULT - ASSESSMENT
Ms. Scott is a 61 yo F who transferred [facility] on 10/30/22 s/p mechanical fall while in her driveway for MRI which which showed cord compression at C3/C4 and C4/C5 levels. Admitted for multidisciplinary rehab program for functional deficits pertaining to Central Cord Syndrome        * UE allodynia and weakness(distal)--continue therapy, * Spasms as reducing     #Central Cord Syndrome (C spine stenosis)  Commence PT/OT 3 hours a day 5 days a week  C/w C-Collar   Limit narcotics given history of delirium     #Mood / Cognition:  - Neuropsych evaluation     #Sleep:  - Maintain quiet hours and low stim environment    # Allergic rash upper back -- c/w steroid cream to rash on upper back, d/c aquafor 11/7  #Pain:  -c/w Robaxin 750mg PO Q8  -c/w Gabapentin 300mg PO TID   -c/w Toradol IV 15mg TID   -c/w tramadol 25mg PO q4 for moderate pain PRN   -c/w tramadol 50mg PO q4 for severe pain PRN   - avoid sedating meds that may affect cognitive recovery    #GI/Bowel:  - Senna 2 tabs daily  - GI ppx: Miralax 17g PO BID PRN for constipation     #/Bladder:--voiding appropriately    #Diet / Dysphagia:    - Diet: Regular Diet   - Nutrition to follow    #Skin/ Pressure Injury Prevention:  -bacitracin for skin abrasions on face   - assessment on admission   - Incisions:  - Turn Q2hrs in bed while awake, OOB to Chair, PT/OT/SLP     #DVT prophylaxis: Lovenox 40mg SQ QD  - SCDs  - Last doppler unknown     #Precautions/ Restrictions  - Falls, Spine  - Ortho:      Weight bearing status: Weight bearing as tolerated   - COVID PCR: COVID Neg 11/4/22    --------------------------------------------  Outpatient Follow up:    Sivakumar Mays)  Neurosurgery  [address] [facility], [place] [address], [address]  [place] [address]  Phone: [phone]  Fax: [phone]  Follow Up Time:

## 2022-11-08 NOTE — PROGRESS NOTE ADULT - ASSESSMENT
63 y/o F with PMH melanoma excision with right arm radiculopathy, who transferred [facility] on 10/30/22 s/p mechanical fall while in her driveway. MRI c/w cord compression at C3/C4 and C4/C5 levels. Admitted for multidisciplinary rehab program for functional deficits pertaining to Central Cord Syndrome.    #Central Cord Syndrome  #s/p Fall  #Neck spasms  #Neuropathy  -Maintain C-Collar per neurosurgery recc  -PT/OT/SLP per rehab team  -Tolerating Gabapentin 400mg TID for now. Continue to monitor for adverse reaction vs tolerance. If tolerating, can slowly titrate up the dose    #Elevated LFTs  -Monitor, encourage PO hydration, limit hepatotoxins  -If worsening, consider RUQ sono    #Hx of B12 deficiency  -No acute concern for anemia  -Follow up with B12 levels  -States she cannot take PO due to possible/potential diagnosis of Pernicious anemia    #DVT ppx - Lovenox

## 2022-11-08 NOTE — PROGRESS NOTE ADULT - SUBJECTIVE AND OBJECTIVE BOX
Patient is a 62y old  Female who presents with a chief complaint of Central cord syndrome at C1 level of cervical spinal cord, initial encounter     (07 Nov 2022 12:17)      SUBJECTIVE / OVERNIGHT EVENTS:  Pt seen and examined at bedside. No acute events overnight. States her neuropathy feels better today. Also spoke about questionable diagnosis of pernicious anemia as she has a family hx of it. States she was advised to maintain her B12 levels in the 800s. She takes B12 inj every 3-4 weeks and is due for it this week.   Pt denies cp, palpitations, sob, abd pain, N/V, fever, chills.    ROS:  All other review of systems negative    Allergies    Cilantro (Unknown)  Demerol (Unknown)    Intolerances        MEDICATIONS  (STANDING):  enoxaparin Injectable 40 milliGRAM(s) SubCutaneous every 24 hours  gabapentin 400 milliGRAM(s) Oral three times a day  hydrocortisone 1% Cream 1 Application(s) Topical two times a day  methocarbamol 750 milliGRAM(s) Oral every 8 hours  mupirocin 2% Ointment 1 Application(s) Topical two times a day    MEDICATIONS  (PRN):  polyethylene glycol 3350 17 Gram(s) Oral two times a day PRN Constipation  traMADol 25 milliGRAM(s) Oral every 4 hours PRN Moderate Pain (4 - 6)  traMADol 50 milliGRAM(s) Oral every 4 hours PRN Severe Pain (7 - 10)      Vital Signs Last 24 Hrs  T(C): 36.9 (07 Nov 2022 19:39), Max: 37.1 (07 Nov 2022 09:12)  T(F): 98.4 (07 Nov 2022 19:39), Max: 98.7 (07 Nov 2022 09:12)  HR: 75 (07 Nov 2022 19:39) (73 - 75)  BP: 130/70 (07 Nov 2022 19:39) (130/70 - 142/77)  BP(mean): --  RR: 16 (07 Nov 2022 19:39) (16 - 17)  SpO2: 95% (07 Nov 2022 19:39) (95% - 96%)    Parameters below as of 07 Nov 2022 19:39  Patient On (Oxygen Delivery Method): room air      CAPILLARY BLOOD GLUCOSE        I&O's Summary      PHYSICAL EXAM:  GENERAL: NAD, well-developed female  HEAD:  Atraumatic, Normocephalic  EYES: EOMI, PERRLA, conjunctiva and sclera clear  NECK: C-collar  CHEST/LUNG: Clear to auscultation bilaterally; No wheeze, nonlabored breathing  HEART: Regular rate and rhythm; No murmurs, rubs, or gallops  ABDOMEN: Soft, Nontender, Nondistended; Bowel sounds present  EXTREMITIES:  2+ Peripheral Pulses, No clubbing, cyanosis, or edema  NEUROLOGY: AAOx3, b/l UE weakness   PSYCH: calm, appropriate mood    LABS:                        12.4   5.58  )-----------( 275      ( 07 Nov 2022 06:26 )             37.3     11-07    144  |  109<H>  |  12  ----------------------------<  83  4.1   |  25  |  0.75    Ca    9.1      07 Nov 2022 06:26    TPro  6.1  /  Alb  3.0<L>  /  TBili  0.5  /  DBili  x   /  AST  72<H>  /  ALT  114<H>  /  AlkPhos  90  11-07              RADIOLOGY & ADDITIONAL TESTS:  Results Reviewed:   Imaging Personally Reviewed:  Electrocardiogram Personally Reviewed:    COORDINATION OF CARE:  Care Discussed with Consultants/Other Providers [Y/N]:  Prior or Outpatient Records Reviewed [Y/N]:

## 2022-11-09 PROCEDURE — 99232 SBSQ HOSP IP/OBS MODERATE 35: CPT

## 2022-11-09 RX ADMIN — Medication 1 APPLICATION(S): at 17:48

## 2022-11-09 RX ADMIN — GABAPENTIN 400 MILLIGRAM(S): 400 CAPSULE ORAL at 05:24

## 2022-11-09 RX ADMIN — METHOCARBAMOL 750 MILLIGRAM(S): 500 TABLET, FILM COATED ORAL at 21:28

## 2022-11-09 RX ADMIN — GABAPENTIN 400 MILLIGRAM(S): 400 CAPSULE ORAL at 21:28

## 2022-11-09 RX ADMIN — MUPIROCIN 1 APPLICATION(S): 20 OINTMENT TOPICAL at 05:26

## 2022-11-09 RX ADMIN — ENOXAPARIN SODIUM 40 MILLIGRAM(S): 100 INJECTION SUBCUTANEOUS at 21:28

## 2022-11-09 RX ADMIN — GABAPENTIN 400 MILLIGRAM(S): 400 CAPSULE ORAL at 14:22

## 2022-11-09 RX ADMIN — Medication 1 APPLICATION(S): at 05:26

## 2022-11-09 RX ADMIN — METHOCARBAMOL 750 MILLIGRAM(S): 500 TABLET, FILM COATED ORAL at 14:22

## 2022-11-09 RX ADMIN — METHOCARBAMOL 750 MILLIGRAM(S): 500 TABLET, FILM COATED ORAL at 05:24

## 2022-11-09 RX ADMIN — MUPIROCIN 1 APPLICATION(S): 20 OINTMENT TOPICAL at 17:48

## 2022-11-09 NOTE — PROGRESS NOTE ADULT - ASSESSMENT
61 y/o F with PMH melanoma excision with right arm radiculopathy, who transferred [facility] on 10/30/22 s/p mechanical fall while in her driveway. MRI c/w cord compression at C3/C4 and C4/C5 levels. Admitted for multidisciplinary rehab program for functional deficits pertaining to Central Cord Syndrome.    #Central Cord Syndrome  #s/p Fall  #Neck spasms  #Neuropathy  -Maintain C-Collar per neurosurgery recc  -PT/OT/SLP per rehab team  -Tolerating Gabapentin 400mg TID for now. Continue to monitor for adverse reaction vs tolerance. If tolerating, can slowly titrate up the dose    #Elevated LFTs  -Monitor, encourage PO hydration, limit hepatotoxins  -If worsening, consider RUQ sono    #Hx of B12 deficiency  -No acute concern for anemia  -Follow up with B12 levels  -States she cannot take PO due to possible/potential diagnosis of Pernicious anemia    #DVT ppx - Lovenox

## 2022-11-09 NOTE — PROGRESS NOTE ADULT - ASSESSMENT
Ms. Scott is a 61 yo F who transferred [facility] on 10/30/22 s/p mechanical fall while in her driveway for MRI which which showed cord compression at C3/C4 and C4/C5 levels. Admitted for multidisciplinary rehab program for functional deficits pertaining to Central Cord Syndrome        * Marked improvement of UE neuropathy   * Pain controlled  * Will clarify from surgeon, when patient can go on air travel and duration of C collar use/spinal precautions     #Central Cord Syndrome (C spine stenosis)  Commence PT/OT 3 hours a day 5 days a week  C/w C-Collar   To clarify from surgeon, when patient can go on air travel and duration of C collar use/spinal precautions     #Mood / Cognition:  - Neuropsych evaluation     #Sleep:  - Maintain quiet hours and low stim environment    # Allergic rash upper back -- c/w steroid cream to rash on upper back, d/c aquafor 11/7  #Pain:  -c/w Robaxin 750mg PO Q8  -c/w Gabapentin 300mg PO TID   -c/w Toradol IV 15mg TID   -c/w tramadol 25mg PO q4 for moderate pain PRN   -c/w tramadol 50mg PO q4 for severe pain PRN       #GI/Bowel:  - Senna 2 tabs daily  - GI ppx: Miralax 17g PO BID PRN for constipation     #/Bladder:--voiding appropriately    #Diet / Dysphagia:    - Diet: Regular Diet   - Nutrition to follow    #Skin/ Pressure Injury Prevention:  -bacitracin for skin abrasions on face   - assessment on admission   - Incisions:  - Turn Q2hrs in bed while awake, OOB to Chair, PT/OT/SLP     #DVT prophylaxis: Lovenox 40mg SQ QD      #Precautions/ Restrictions  - Falls, Spine  - Ortho:      Weight bearing status: Weight bearing as tolerated   - COVID PCR: COVID Neg 11/4/22    --------------------------------------------  Outpatient Follow up:    Sivakumar Mays)  Neurosurgery  [address] [facility], [address]  [place] [address]  Phone: [phone]  Fax: [phone]  Follow Up Time:         Ms. Scott is a 63 yo F who transferred [facility] on 10/30/22 s/p mechanical fall while in her driveway for MRI which which showed cord compression at C3/C4 and C4/C5 levels. Admitted for multidisciplinary rehab program for functional deficits pertaining to Central Cord Syndrome        * Marked improvement of UE neuropathy   * Pain controlled  * Will clarify from surgeon, when patient can go on air travel and duration of C collar use/spinal precautions     #Central Cord Syndrome (C spine stenosis)  Commence PT/OT 3 hours a day 5 days a week  C/w C-Collar   To clarify from surgeon, when patient can go on air travel and duration of C collar use/spinal precautions     #Mood / Cognition:  - Neuropsych evaluation     #Sleep:  - Maintain quiet hours and low stim environment    # Allergic rash upper back -- c/w steroid cream to rash on upper back, d/c aquafor 11/7  #Pain:  -c/w Robaxin 750mg PO Q8  -c/w Gabapentin 300mg PO TID   -c/w Toradol IV 15mg TID   -c/w tramadol 25mg PO q4 for moderate pain PRN   -c/w tramadol 50mg PO q4 for severe pain PRN       #GI/Bowel:  - Senna 2 tabs daily  - GI ppx: Miralax 17g PO BID PRN for constipation     #/Bladder:--voiding appropriately    #Diet / Dysphagia:    - Diet: Regular Diet   - Nutrition to follow    #Skin/ Pressure Injury Prevention:  -bacitracin for skin abrasions on face   - assessment on admission   - Incisions:  - Turn Q2hrs in bed while awake, OOB to Chair, PT/OT/SLP     #DVT prophylaxis: Lovenox 40mg SQ QD    IDT 11/9  TDD:11/16  Barriers; Neuropathic pain  SW: Lives in  with , 9 CRYSTAL, 1 HR with 1st floor living  OT: Min A for ADLs, CG for transfers  PT: Amb  ft with no device. CS for transfers. Negotiated 12 steps with 1 HR.   SLP: N/A      #Precautions/ Restrictions  - Falls, Spine  - Ortho:      Weight bearing status: Weight bearing as tolerated   - COVID PCR: COVID Neg 11/4/22    --------------------------------------------  Outpatient Follow up:    Sivakumar Mays)  Neurosurgery  [address] [facility], [facility] [address]  [place] [address]  Phone: [phone]  Fax: [phone]  Follow Up Time:

## 2022-11-09 NOTE — PROGRESS NOTE ADULT - SUBJECTIVE AND OBJECTIVE BOX
Subjective  Seen and examined, chart examined, observed in OT  Reports continued improvement, reduced intensity of UE neuropathy  D/W therapists, they report improvement with dexterity of fingers and motor activity  Allodynia on both hands significantly reduced and ROM thumb improved R>L  She reports a planned holiday to [place] by Thanksgiving period, wants to know if C collar can be d/anthony that time and if fit to fly  Was told that we will get clarification from surgeon    Therapy--as noted, improved overall improvement  UE function improving  But still has reduced Left thumb ROM and residual UE neuropathy     ROS--No head ache, N/V Dyspnea or abd pain  Allodynia, reducing both thumb/hands  Weakness of hands  LBM 11/9    Peer to peer yesterday, insurance approved Acute rehab for 1 wk, due review afterwards for extension of approval period   Vital Signs Last 24 Hrs  T(C): 36.5 (09 Nov 2022 08:00), Max: 36.7 (08 Nov 2022 20:46)  T(F): 97.7 (09 Nov 2022 08:00), Max: 98 (08 Nov 2022 20:46)  HR: 79 (09 Nov 2022 08:00) (63 - 79)  BP: 135/85 (09 Nov 2022 08:00) (135/85 - 139/80)  RR: 15 (09 Nov 2022 08:00) (15 - 17)  SpO2: 95% (09 Nov 2022 08:00) (95% - 97%)    O2 Parameters below as of 09 Nov 2022 08:00  Patient On (Oxygen Delivery Method): room air    Physical Exam:   Constitutional - Comfortable  HEENT - NAD  Neck - Supple, No limited ROM  Chest - Clear   Cardio - normal heart sounds  Abdomen -  Soft, non tender  Extremities - No peripheral edema, No calf tenderness     Neurologic Exam:                 No cognitive deficits  Cranial Nerves - No facial asymmetry, Tongue midline, EOMI, Shoulder shrug intact  Allodynia over fingers  Motor -  LE 5/5 UE 3+/5 finger flexion/wrist extension,      Sensory - Intact to LT bilateral, allodynia pain in C6 dermatome,     Reflexes - DTR intact     Coordination - FTN intact     OculoVestibular -  No nystagmus  Psychiatric - Mood stable, Affect WNL  Skin--erythematous rash upper back, no vesicles                           12.4   5.58  )-----------( 275      ( 07 Nov 2022 06:26 )             37.3     11-07    144  |  109<H>  |  12  ----------------------------<  83  4.1   |  25  |  0.75    Ca    9.1      07 Nov 2022 06:26    TPro  6.1  /  Alb  3.0<L>  /  TBili  0.5  /  DBili  x   /  AST  72<H>  /  ALT  114<H>  /  AlkPhos  90  11-07    MEDICATIONS  (STANDING):  enoxaparin Injectable 40 milliGRAM(s) SubCutaneous every 24 hours  gabapentin 400 milliGRAM(s) Oral three times a day  hydrocortisone 1% Cream 1 Application(s) Topical two times a day  methocarbamol 750 milliGRAM(s) Oral every 8 hours  mupirocin 2% Ointment 1 Application(s) Topical two times a day    MEDICATIONS  (PRN):  acetaminophen     Tablet .. 650 milliGRAM(s) Oral every 6 hours PRN Moderate Pain (4 - 6)  polyethylene glycol 3350 17 Gram(s) Oral two times a day PRN Constipation  traMADol 25 milliGRAM(s) Oral every 4 hours PRN Moderate Pain (4 - 6)  traMADol 50 milliGRAM(s) Oral every 4 hours PRN Severe Pain (7 - 10)

## 2022-11-09 NOTE — PROGRESS NOTE ADULT - SUBJECTIVE AND OBJECTIVE BOX
Patient is a 62y old  Female who presents with a chief complaint of Cervical spine stenosis with cord compression (08 Nov 2022 09:41)      SUBJECTIVE / OVERNIGHT EVENTS:  Pt seen and examined at bedside. No acute events overnight.  Pt denies cp, palpitations, sob, abd pain, N/V, fever, chills.    ROS:  All other review of systems negative    Allergies    Cilantro (Unknown)  Demerol (Unknown)    Intolerances        MEDICATIONS  (STANDING):  enoxaparin Injectable 40 milliGRAM(s) SubCutaneous every 24 hours  gabapentin 400 milliGRAM(s) Oral three times a day  hydrocortisone 1% Cream 1 Application(s) Topical two times a day  methocarbamol 750 milliGRAM(s) Oral every 8 hours  mupirocin 2% Ointment 1 Application(s) Topical two times a day    MEDICATIONS  (PRN):  acetaminophen     Tablet .. 650 milliGRAM(s) Oral every 6 hours PRN Moderate Pain (4 - 6)  polyethylene glycol 3350 17 Gram(s) Oral two times a day PRN Constipation  traMADol 25 milliGRAM(s) Oral every 4 hours PRN Moderate Pain (4 - 6)  traMADol 50 milliGRAM(s) Oral every 4 hours PRN Severe Pain (7 - 10)      Vital Signs Last 24 Hrs  T(C): 36.5 (09 Nov 2022 08:00), Max: 36.7 (08 Nov 2022 20:46)  T(F): 97.7 (09 Nov 2022 08:00), Max: 98 (08 Nov 2022 20:46)  HR: 79 (09 Nov 2022 08:00) (63 - 79)  BP: 135/85 (09 Nov 2022 08:00) (135/85 - 139/80)  BP(mean): --  RR: 15 (09 Nov 2022 08:00) (15 - 17)  SpO2: 95% (09 Nov 2022 08:00) (95% - 97%)    Parameters below as of 09 Nov 2022 08:00  Patient On (Oxygen Delivery Method): room air      CAPILLARY BLOOD GLUCOSE        I&O's Summary      PHYSICAL EXAM:  GENERAL: NAD, well-developed female  HEAD:  Atraumatic, Normocephalic  EYES: EOMI, PERRLA, conjunctiva and sclera clear  NECK: C-collar  CHEST/LUNG: Clear to auscultation bilaterally; No wheeze, nonlabored breathing  HEART: Regular rate and rhythm; No murmurs, rubs, or gallops  ABDOMEN: Soft, Nontender, Nondistended; Bowel sounds present  EXTREMITIES:  2+ Peripheral Pulses, No clubbing, cyanosis, or edema  NEUROLOGY: AAOx3, b/l UE weakness   PSYCH: calm, appropriate mood    LABS:                    RADIOLOGY & ADDITIONAL TESTS:  Results Reviewed:   Imaging Personally Reviewed:  Electrocardiogram Personally Reviewed:    COORDINATION OF CARE:  Care Discussed with Consultants/Other Providers [Y/N]:  Prior or Outpatient Records Reviewed [Y/N]:

## 2022-11-10 LAB
24R-OH-CALCIDIOL SERPL-MCNC: 20.3 NG/ML — LOW (ref 30–80)
ALBUMIN SERPL ELPH-MCNC: 3.2 G/DL — LOW (ref 3.3–5)
ALP SERPL-CCNC: 84 U/L — SIGNIFICANT CHANGE UP (ref 40–120)
ALT FLD-CCNC: 76 U/L — HIGH (ref 10–45)
ANION GAP SERPL CALC-SCNC: 9 MMOL/L — SIGNIFICANT CHANGE UP (ref 5–17)
AST SERPL-CCNC: 27 U/L — SIGNIFICANT CHANGE UP (ref 10–40)
BILIRUB SERPL-MCNC: 0.4 MG/DL — SIGNIFICANT CHANGE UP (ref 0.2–1.2)
BUN SERPL-MCNC: 16 MG/DL — SIGNIFICANT CHANGE UP (ref 7–23)
CALCIUM SERPL-MCNC: 9.1 MG/DL — SIGNIFICANT CHANGE UP (ref 8.4–10.5)
CHLORIDE SERPL-SCNC: 109 MMOL/L — HIGH (ref 96–108)
CO2 SERPL-SCNC: 24 MMOL/L — SIGNIFICANT CHANGE UP (ref 22–31)
CREAT SERPL-MCNC: 0.68 MG/DL — SIGNIFICANT CHANGE UP (ref 0.5–1.3)
EGFR: 97 ML/MIN/1.73M2 — SIGNIFICANT CHANGE UP
GLUCOSE SERPL-MCNC: 87 MG/DL — SIGNIFICANT CHANGE UP (ref 70–99)
HCT VFR BLD CALC: 39.2 % — SIGNIFICANT CHANGE UP (ref 34.5–45)
HGB BLD-MCNC: 12.8 G/DL — SIGNIFICANT CHANGE UP (ref 11.5–15.5)
MCHC RBC-ENTMCNC: 30.7 PG — SIGNIFICANT CHANGE UP (ref 27–34)
MCHC RBC-ENTMCNC: 32.7 GM/DL — SIGNIFICANT CHANGE UP (ref 32–36)
MCV RBC AUTO: 94 FL — SIGNIFICANT CHANGE UP (ref 80–100)
NRBC # BLD: 0 /100 WBCS — SIGNIFICANT CHANGE UP (ref 0–0)
PLATELET # BLD AUTO: 319 K/UL — SIGNIFICANT CHANGE UP (ref 150–400)
POTASSIUM SERPL-MCNC: 3.8 MMOL/L — SIGNIFICANT CHANGE UP (ref 3.5–5.3)
POTASSIUM SERPL-SCNC: 3.8 MMOL/L — SIGNIFICANT CHANGE UP (ref 3.5–5.3)
PROT SERPL-MCNC: 6.3 G/DL — SIGNIFICANT CHANGE UP (ref 6–8.3)
RBC # BLD: 4.17 M/UL — SIGNIFICANT CHANGE UP (ref 3.8–5.2)
RBC # FLD: 13.3 % — SIGNIFICANT CHANGE UP (ref 10.3–14.5)
SODIUM SERPL-SCNC: 142 MMOL/L — SIGNIFICANT CHANGE UP (ref 135–145)
VIT B12 SERPL-MCNC: 427 PG/ML — SIGNIFICANT CHANGE UP (ref 232–1245)
WBC # BLD: 7.22 K/UL — SIGNIFICANT CHANGE UP (ref 3.8–10.5)
WBC # FLD AUTO: 7.22 K/UL — SIGNIFICANT CHANGE UP (ref 3.8–10.5)

## 2022-11-10 PROCEDURE — 99232 SBSQ HOSP IP/OBS MODERATE 35: CPT

## 2022-11-10 PROCEDURE — 99233 SBSQ HOSP IP/OBS HIGH 50: CPT

## 2022-11-10 RX ADMIN — GABAPENTIN 400 MILLIGRAM(S): 400 CAPSULE ORAL at 14:41

## 2022-11-10 RX ADMIN — GABAPENTIN 400 MILLIGRAM(S): 400 CAPSULE ORAL at 06:02

## 2022-11-10 RX ADMIN — ENOXAPARIN SODIUM 40 MILLIGRAM(S): 100 INJECTION SUBCUTANEOUS at 20:57

## 2022-11-10 RX ADMIN — METHOCARBAMOL 750 MILLIGRAM(S): 500 TABLET, FILM COATED ORAL at 06:03

## 2022-11-10 RX ADMIN — METHOCARBAMOL 750 MILLIGRAM(S): 500 TABLET, FILM COATED ORAL at 21:00

## 2022-11-10 RX ADMIN — GABAPENTIN 400 MILLIGRAM(S): 400 CAPSULE ORAL at 21:00

## 2022-11-10 RX ADMIN — MUPIROCIN 1 APPLICATION(S): 20 OINTMENT TOPICAL at 19:03

## 2022-11-10 RX ADMIN — METHOCARBAMOL 750 MILLIGRAM(S): 500 TABLET, FILM COATED ORAL at 14:41

## 2022-11-10 RX ADMIN — MUPIROCIN 1 APPLICATION(S): 20 OINTMENT TOPICAL at 06:03

## 2022-11-10 NOTE — PROGRESS NOTE ADULT - ASSESSMENT
Ms. Scott is a 63 yo F who transferred [facility] on 10/30/22 s/p mechanical fall while in her driveway for MRI which which showed cord compression at C3/C4 and C4/C5 levels. Admitted for multidisciplinary rehab program for functional deficits pertaining to Central Cord Syndrome       * Marked improvement of UE neuropathy, patient to confirm preferred d/c date (earlier than date as per IDT)  *  C collar use/spinal precautions 4-6wks     #Central Cord Syndrome (C spine stenosis)  Commence PT/OT 3 hours a day 5 days a week  C/w C-Collar   -- C collar use/spinal precautions 4-6wks - Surgeon to confirm clearance for air travel during out patient review    #Mood / Cognition:  - Neuropsych evaluation     #Sleep:  - Maintain quiet hours and low stim environment    # Allergic rash upper back -- c/w steroid cream to rash on upper back, d/c aquafor 11/7  #Pain:  -c/w Robaxin 750mg PO Q8  -c/w Gabapentin 300mg PO TID   -c/w Toradol IV 15mg TID   -c/w tramadol 25mg PO q4 for moderate pain PRN   -c/w tramadol 50mg PO q4 for severe pain PRN       #GI/Bowel:  - Senna 2 tabs daily  - GI ppx: Miralax 17g PO BID PRN for constipation     #/Bladder:--voiding appropriately    #Diet / Dysphagia:    - Diet: Regular Diet   - Nutrition to follow    #Skin/ Pressure Injury Prevention:  -bacitracin for skin abrasions on face     #DVT prophylaxis: Lovenox 40mg SQ QD    IDT 11/9  TDD:11/16  Barriers; Neuropathic pain  SW: Lives in  with , 9 CRYSTAL, 1 HR with 1st floor living  OT: Min A for ADLs, CG for transfers  PT: Amb  ft with no device. CS for transfers. Negotiated 12 steps with 1 HR.   SLP: N/A      #Precautions/ Restrictions  - Falls, Spine  - Ortho:      Weight bearing status: Weight bearing as tolerated   - COVID PCR: COVID Neg 11/4/22    --------------------------------------------  Outpatient Follow up:    Sivakumar Mays)  Neurosurgery  [address], [address]  [place] [address]  Phone: [phone]  Fax: [phone]  Follow Up Time:         Ms. Scott is a 63 yo F who transferred [facility] on 10/30/22 s/p mechanical fall while in her driveway for MRI which which showed cord compression at C3/C4 and C4/C5 levels. Admitted for multidisciplinary rehab program for functional deficits pertaining to Central Cord Syndrome       * Marked improvement of UE neuropathy, patient to confirm preferred d/c date (earlier than date as per IDT)  *  C collar use/spinal precautions 4-6wks     #Central Cord Syndrome (C spine stenosis)  Commence PT/OT 3 hours a day 5 days a week  C/w C-Collar   -- C collar use/spinal precautions 4-6wks - Surgeon to confirm clearance for air travel during out patient review    #Mood / Cognition:  - Neuropsych evaluation     #Sleep:  - Maintain quiet hours and low stim environment    # Allergic rash upper back -- c/w steroid cream to rash on upper back, d/c aquafor 11/7  #Pain:  -c/w Robaxin 750mg PO Q8  -c/w Gabapentin 300mg PO TID   -c/w Toradol IV 15mg TID   -c/w tramadol 25mg PO q4 for moderate pain PRN   -c/w tramadol 50mg PO q4 for severe pain PRN       #GI/Bowel:  - Senna 2 tabs daily  - GI ppx: Miralax 17g PO BID PRN for constipation     #/Bladder:--voiding appropriately    #Diet / Dysphagia:    - Diet: Regular Diet   - Nutrition to follow    #Skin/ Pressure Injury Prevention:  -bacitracin for skin abrasions on face     #DVT prophylaxis: Lovenox 40mg SQ QD    IDT 11/9  TDD:11/16  Barriers; Neuropathic pain  SW: Lives in  with , 9 CRYSTAL, 1 HR with 1st floor living  OT: Min A for ADLs, CG for transfers  PT: Amb  ft with no device. CS for transfers. Negotiated 12 steps with 1 HR.   SLP: N/A    Liaison with family-11/10---I called patient's --Mr Sood., he was unavailable this time. I left a voice message, regarding my intention--to update on patient's functional progress, discuss dc plan and patient's preference for earlier dc Sat 11/13  I left a call back number.    #Precautions/ Restrictions  - Falls, Spine  - Ortho:      Weight bearing status: Weight bearing as tolerated   - COVID PCR: COVID Neg 11/4/22    --------------------------------------------  Outpatient Follow up:    Sivakumar Mays)  [address], [facility] [address]  [place] [address]  Phone: [phone]  Fax: [phone]  Follow Up Time:

## 2022-11-10 NOTE — PROGRESS NOTE ADULT - ASSESSMENT
63 y/o F with PMH melanoma excision with right arm radiculopathy, who transferred [facility] on 10/30/22 s/p mechanical fall while in her driveway. MRI c/w cord compression at C3/C4 and C4/C5 levels. Admitted for multidisciplinary rehab program for functional deficits pertaining to Central Cord Syndrome.    #Central Cord Syndrome  #s/p Fall  #Neck spasms  #Neuropathy  -Maintain C-Collar per neurosurgery recc  -PT/OT/SLP per rehab team  -Tolerating Gabapentin 400mg TID. Pt will like to keep current dose. Agree with no need for further titration    #Elevated LFTs  -Resolved  -Monitor, encourage PO hydration, limit hepatotoxins    #Hx of B12 deficiency  -No acute concern for anemia  -Follow up with B12 levels  -States she cannot take PO due to possible/potential diagnosis of Pernicious anemia    #DVT ppx - Lovenox

## 2022-11-10 NOTE — CHART NOTE - NSCHARTNOTEFT_GEN_A_CORE
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

## 2022-11-10 NOTE — PROGRESS NOTE ADULT - SUBJECTIVE AND OBJECTIVE BOX
Subjective  Seen and examined, at her bed side  Reports continued improvement of UE function--increased ROM left thumb and b/l upper extremity  Reports independence with personal hygiene  Ambulating without device up to 80ft    No acute med complaint   upper back rash resolved      ROS--No head ache, N/V Dyspnea or abd pain  Allodynia, now minimal, atrophy of thenar eminence, non progressive  Weakness of hands  LBM 11/10    Liaison from he surgeon: We informed patient that her surgeon, stated that spinal precautions and C collar at all time, will continue for 4-6wks  Patient to discuss clearance for air travel during outpatient post surgical review    Considering her continued improvement, she will discuss possibility of earlier date d/c with family and update team  We will be happy to honor that    Vital Signs Last 24 Hrs  T(C): 36.7 (09 Nov 2022 21:26), Max: 36.7 (09 Nov 2022 21:26)  T(F): 98.1 (09 Nov 2022 21:26), Max: 98.1 (09 Nov 2022 21:26)  HR: 73 (09 Nov 2022 21:26) (73 - 73)  BP: 135/77 (09 Nov 2022 21:26) (135/77 - 135/77)  RR: 15 (09 Nov 2022 21:26) (15 - 15)  SpO2: 97% (09 Nov 2022 21:26) (97% - 97%)    O2 Parameters below as of 09 Nov 2022 21:26  Patient On (Oxygen Delivery Method): room air      Physical Exam:   Constitutional - Comfortable  HEENT - NAD  Neck - Supple, No limited ROM  Chest - Clear   Cardio - HS I and II  Abdomen -  Soft, non tender  Extremities - No peripheral edema, No calf tenderness     Neurologic Exam:                 No cognitive deficits  Cranial Nerves - No facial asymmetry, Tongue midline, EOMI, Shoulder shrug intact  Allodynia over fingers,much reduced  Motor -  LE 5/5 UE 3+/5 finger flexion/wrist extension,   Improved ROM hands     Sensory - Intact to LT bilateral, allodynia in C6 dermatome,     Reflexes - DTR intact     Coordination - FTN intact     OculoVestibular -  No nystagmus  Psychiatric - Mood stable, Affect WNL  Skin--erythematous rash upper back, no vesicles     RECENT LABS/IMAGING                        12.8   7.22  )-----------( 319      ( 10 Nov 2022 06:36 )             39.2     11-10    142  |  109<H>  |  16  ----------------------------<  87  3.8   |  24  |  0.68    Ca    9.1      10 Nov 2022 06:36    TPro  6.3  /  Alb  3.2<L>  /  TBili  0.4  /  DBili  x   /  AST  27  /  ALT  76<H>  /  AlkPhos  84  11-10    MEDICATIONS  (STANDING):  enoxaparin Injectable 40 milliGRAM(s) SubCutaneous every 24 hours  gabapentin 400 milliGRAM(s) Oral three times a day  methocarbamol 750 milliGRAM(s) Oral every 8 hours  mupirocin 2% Ointment 1 Application(s) Topical two times a day    MEDICATIONS  (PRN):  acetaminophen     Tablet .. 650 milliGRAM(s) Oral every 6 hours PRN Moderate Pain (4 - 6)  polyethylene glycol 3350 17 Gram(s) Oral two times a day PRN Constipation  traMADol 25 milliGRAM(s) Oral every 4 hours PRN Moderate Pain (4 - 6)  traMADol 50 milliGRAM(s) Oral every 4 hours PRN Severe Pain (7 - 10)                   Subjective  Seen and examined, at her bed side  Reports continued improvement of UE function--increased ROM left thumb and b/l upper extremity  Reports independence with personal hygiene  Ambulating without device up to 80ft    No acute med complaint   upper back rash resolved      Liaison with family--I called patient's --Mr Sood., he was unavailable this time. I left a voice message, regarding my intention--to update on patient's functional progress, discuss dc plan and patient's preference for earlier dc Sat 11/13  I left a call back number.    ROS--No head ache, N/V Dyspnea or abd pain  Allodynia, now minimal, atrophy of thenar eminence, non progressive  Weakness of hands  LBM 11/10    Liaison from  surgeon: We informed patient that her surgeon, stated that spinal precautions and C collar at all time, will continue for 4-6wks  Patient to discuss clearance for air travel during outpatient post surgical review    Considering her continued improvement, she will discuss possibility of earlier date d/c with family and update team  We will be happy to honor that    Vital Signs Last 24 Hrs  T(C): 36.7 (09 Nov 2022 21:26), Max: 36.7 (09 Nov 2022 21:26)  T(F): 98.1 (09 Nov 2022 21:26), Max: 98.1 (09 Nov 2022 21:26)  HR: 73 (09 Nov 2022 21:26) (73 - 73)  BP: 135/77 (09 Nov 2022 21:26) (135/77 - 135/77)  RR: 15 (09 Nov 2022 21:26) (15 - 15)  SpO2: 97% (09 Nov 2022 21:26) (97% - 97%)    O2 Parameters below as of 09 Nov 2022 21:26  Patient On (Oxygen Delivery Method): room air      Physical Exam:   Constitutional - Comfortable  HEENT - NAD  Neck - Supple, No limited ROM  Chest - Clear   Cardio - HS I and II  Abdomen -  Soft, non tender  Extremities - No peripheral edema, No calf tenderness     Neurologic Exam:                 No cognitive deficits  Cranial Nerves - No facial asymmetry, Tongue midline, EOMI, Shoulder shrug intact  Allodynia over fingers,much reduced  Motor -  LE 5/5 UE 3+/5 finger flexion/wrist extension,   Improved ROM hands     Sensory - Intact to LT bilateral, allodynia in C6 dermatome,     Reflexes - DTR intact     Coordination - FTN intact     OculoVestibular -  No nystagmus  Psychiatric - Mood stable, Affect WNL  Skin--erythematous rash upper back, no vesicles     RECENT LABS/IMAGING                        12.8   7.22  )-----------( 319      ( 10 Nov 2022 06:36 )             39.2     11-10    142  |  109<H>  |  16  ----------------------------<  87  3.8   |  24  |  0.68    Ca    9.1      10 Nov 2022 06:36    TPro  6.3  /  Alb  3.2<L>  /  TBili  0.4  /  DBili  x   /  AST  27  /  ALT  76<H>  /  AlkPhos  84  11-10    MEDICATIONS  (STANDING):  enoxaparin Injectable 40 milliGRAM(s) SubCutaneous every 24 hours  gabapentin 400 milliGRAM(s) Oral three times a day  methocarbamol 750 milliGRAM(s) Oral every 8 hours  mupirocin 2% Ointment 1 Application(s) Topical two times a day    MEDICATIONS  (PRN):  acetaminophen     Tablet .. 650 milliGRAM(s) Oral every 6 hours PRN Moderate Pain (4 - 6)  polyethylene glycol 3350 17 Gram(s) Oral two times a day PRN Constipation  traMADol 25 milliGRAM(s) Oral every 4 hours PRN Moderate Pain (4 - 6)  traMADol 50 milliGRAM(s) Oral every 4 hours PRN Severe Pain (7 - 10)

## 2022-11-10 NOTE — PROGRESS NOTE ADULT - SUBJECTIVE AND OBJECTIVE BOX
Patient is a 62y old  Female who presents with a chief complaint of Cervical spine stenosis with cord compression (09 Nov 2022 10:51)      SUBJECTIVE / OVERNIGHT EVENTS:  Pt seen and examined at bedside. No acute events overnight.  Pt denies cp, palpitations, sob, abd pain, N/V, fever, chills.    ROS:  All other review of systems negative    Allergies    Cilantro (Unknown)  Demerol (Unknown)    Intolerances        MEDICATIONS  (STANDING):  enoxaparin Injectable 40 milliGRAM(s) SubCutaneous every 24 hours  gabapentin 400 milliGRAM(s) Oral three times a day  hydrocortisone 1% Cream 1 Application(s) Topical two times a day  methocarbamol 750 milliGRAM(s) Oral every 8 hours  mupirocin 2% Ointment 1 Application(s) Topical two times a day    MEDICATIONS  (PRN):  acetaminophen     Tablet .. 650 milliGRAM(s) Oral every 6 hours PRN Moderate Pain (4 - 6)  polyethylene glycol 3350 17 Gram(s) Oral two times a day PRN Constipation  traMADol 25 milliGRAM(s) Oral every 4 hours PRN Moderate Pain (4 - 6)  traMADol 50 milliGRAM(s) Oral every 4 hours PRN Severe Pain (7 - 10)      Vital Signs Last 24 Hrs  T(C): 36.7 (09 Nov 2022 21:26), Max: 36.7 (09 Nov 2022 21:26)  T(F): 98.1 (09 Nov 2022 21:26), Max: 98.1 (09 Nov 2022 21:26)  HR: 73 (09 Nov 2022 21:26) (73 - 79)  BP: 135/77 (09 Nov 2022 21:26) (135/77 - 135/85)  BP(mean): --  RR: 15 (09 Nov 2022 21:26) (15 - 15)  SpO2: 97% (09 Nov 2022 21:26) (95% - 97%)    Parameters below as of 09 Nov 2022 21:26  Patient On (Oxygen Delivery Method): room air      CAPILLARY BLOOD GLUCOSE        I&O's Summary      PHYSICAL EXAM:  GENERAL: NAD, well-developed female  HEAD:  Atraumatic, Normocephalic  EYES: EOMI, PERRLA, conjunctiva and sclera clear  NECK: C-collar  CHEST/LUNG: Clear to auscultation bilaterally; No wheeze, nonlabored breathing  HEART: Regular rate and rhythm; No murmurs, rubs, or gallops  ABDOMEN: Soft, Nontender, Nondistended; Bowel sounds present  EXTREMITIES:  2+ Peripheral Pulses, No clubbing, cyanosis, or edema  NEUROLOGY: AAOx3, b/l UE weakness   PSYCH: calm, appropriate mood      LABS:                        12.8   7.22  )-----------( 319      ( 10 Nov 2022 06:36 )             39.2     11-10    142  |  109<H>  |  16  ----------------------------<  87  3.8   |  24  |  0.68    Ca    9.1      10 Nov 2022 06:36    TPro  6.3  /  Alb  3.2<L>  /  TBili  0.4  /  DBili  x   /  AST  27  /  ALT  76<H>  /  AlkPhos  84  11-10              RADIOLOGY & ADDITIONAL TESTS:  Results Reviewed:   Imaging Personally Reviewed:  Electrocardiogram Personally Reviewed:    COORDINATION OF CARE:  Care Discussed with Consultants/Other Providers [Y/N]:  Prior or Outpatient Records Reviewed [Y/N]:

## 2022-11-11 ENCOUNTER — TRANSCRIPTION ENCOUNTER (OUTPATIENT)
Age: 62
End: 2022-11-11

## 2022-11-11 PROCEDURE — 99231 SBSQ HOSP IP/OBS SF/LOW 25: CPT

## 2022-11-11 PROCEDURE — 99232 SBSQ HOSP IP/OBS MODERATE 35: CPT

## 2022-11-11 RX ORDER — ACETAMINOPHEN 500 MG
2 TABLET ORAL
Qty: 0 | Refills: 0 | DISCHARGE
Start: 2022-11-11

## 2022-11-11 RX ORDER — PREGABALIN 225 MG/1
1000 CAPSULE ORAL ONCE
Refills: 0 | Status: COMPLETED | OUTPATIENT
Start: 2022-11-11 | End: 2022-11-11

## 2022-11-11 RX ORDER — CHOLECALCIFEROL (VITAMIN D3) 125 MCG
2000 CAPSULE ORAL DAILY
Refills: 0 | Status: DISCONTINUED | OUTPATIENT
Start: 2022-11-11 | End: 2022-11-12

## 2022-11-11 RX ORDER — GABAPENTIN 400 MG/1
1 CAPSULE ORAL
Qty: 90 | Refills: 0
Start: 2022-11-11 | End: 2022-12-10

## 2022-11-11 RX ORDER — CHOLECALCIFEROL (VITAMIN D3) 125 MCG
2000 CAPSULE ORAL
Qty: 0 | Refills: 0 | DISCHARGE
Start: 2022-11-11

## 2022-11-11 RX ORDER — POLYETHYLENE GLYCOL 3350 17 G/17G
17 POWDER, FOR SOLUTION ORAL
Qty: 0 | Refills: 0 | DISCHARGE
Start: 2022-11-11

## 2022-11-11 RX ADMIN — GABAPENTIN 400 MILLIGRAM(S): 400 CAPSULE ORAL at 06:23

## 2022-11-11 RX ADMIN — GABAPENTIN 400 MILLIGRAM(S): 400 CAPSULE ORAL at 13:07

## 2022-11-11 RX ADMIN — METHOCARBAMOL 750 MILLIGRAM(S): 500 TABLET, FILM COATED ORAL at 13:07

## 2022-11-11 RX ADMIN — ENOXAPARIN SODIUM 40 MILLIGRAM(S): 100 INJECTION SUBCUTANEOUS at 21:04

## 2022-11-11 RX ADMIN — GABAPENTIN 400 MILLIGRAM(S): 400 CAPSULE ORAL at 21:04

## 2022-11-11 RX ADMIN — Medication 2000 UNIT(S): at 13:07

## 2022-11-11 RX ADMIN — PREGABALIN 1000 MICROGRAM(S): 225 CAPSULE ORAL at 09:45

## 2022-11-11 RX ADMIN — METHOCARBAMOL 750 MILLIGRAM(S): 500 TABLET, FILM COATED ORAL at 21:04

## 2022-11-11 RX ADMIN — METHOCARBAMOL 750 MILLIGRAM(S): 500 TABLET, FILM COATED ORAL at 06:22

## 2022-11-11 RX ADMIN — MUPIROCIN 1 APPLICATION(S): 20 OINTMENT TOPICAL at 21:03

## 2022-11-11 NOTE — PROGRESS NOTE ADULT - ASSESSMENT
61 y/o F with PMH melanoma excision with right arm radiculopathy, who transferred [facility] on 10/30/22 s/p mechanical fall while in her driveway. MRI c/w cord compression at C3/C4 and C4/C5 levels. Admitted for multidisciplinary rehab program for functional deficits pertaining to Central Cord Syndrome.    #Central Cord Syndrome  #s/p Fall  #Neck spasms  #Neuropathy  -Maintain C-Collar per neurosurgery recc  -PT/OT/SLP per rehab team  -Tolerating Gabapentin 400mg TID. Pt will like to keep current dose. Agree with no need for further titration    #Vit D Insufficiency  -Start Vit D 2000units per day    #Elevated LFTs  -Resolved  -Monitor, encourage PO hydration, limit hepatotoxins    #Hx of B12 deficiency  -No acute concern for anemia  -B12 levels normal, however, pt states her goal is supposed to be >800  -States she cannot take PO due to possible/potential diagnosis of Pernicious anemia  -Provide B12 1000mcg inj x once today    #DVT ppx - Lovenox

## 2022-11-11 NOTE — PROGRESS NOTE ADULT - ASSESSMENT
Ms. Scott is a 63 yo F who transferred [facility] on 10/30/22 s/p mechanical fall while in her driveway for MRI which which showed cord compression at C3/C4 and C4/C5 levels. Admitted for multidisciplinary rehab program for functional deficits pertaining to Central Cord Syndrome        #Central Cord Syndrome (C spine stenosis)  Commence PT/OT 3 hours a day 5 days a week  C/w C-Collar   -- C collar use/spinal precautions 4-6wks - Surgeon to confirm clearance for air travel during out patient review    #Mood / Cognition:  - Neuropsych evaluation     #Sleep:  - Maintain quiet hours and low stim environment    # Allergic rash upper back -- c/w steroid cream to rash on upper back, d/c aquafor 11/7  #Pain:  -c/w Robaxin 750mg PO Q8  -c/w Gabapentin 300mg PO TID   -c/w Toradol IV 15mg TID   -c/w tramadol 25mg PO q4 for moderate pain PRN   -c/w tramadol 50mg PO q4 for severe pain PRN       #GI/Bowel:  - Senna 2 tabs daily  - GI ppx: Miralax 17g PO BID PRN for constipation     #/Bladder:--voiding appropriately    #Diet / Dysphagia:    - Diet: Regular Diet   - Nutrition to follow    #Skin/ Pressure Injury Prevention:  -bacitracin for skin abrasions on face     #DVT prophylaxis: Lovenox 40mg SQ QD    IDT 11/9  TDD:11/16  Barriers; Neuropathic pain  SW: Lives in  with , 9 CRYSTAL, 1 HR with 1st floor living  OT: Min A for ADLs, CG for transfers  PT: Amb  ft with no device. CS for transfers. Negotiated 12 steps with 1 HR.   SLP: N/A    Liaison with family-11/10---I called patient's --Mr Sood., he was unavailable this time. I left a voice message, regarding my intention--to update on patient's functional progress, discuss dc plan and patient's preference for earlier dc Sat 11/13  I left a call back number.    #Precautions/ Restrictions  - Falls, Spine  - Ortho:      Weight bearing status: Weight bearing as tolerated   - COVID PCR: COVID Neg 11/4/22    --------------------------------------------  Outpatient Follow up:    Sivakumar Mays)  Neurosurgery  [address] [place] [address], [facility] [address]  [place] [address]  Phone: [phone]  Fax: [phone]  Follow Up Time:

## 2022-11-11 NOTE — DISCHARGE NOTE PROVIDER - NSDCMRMEDTOKEN_GEN_ALL_CORE_FT
acetaminophen 325 mg oral tablet: 2 tab(s) orally every 6 hours, As needed, Moderate Pain (4 - 6)  cholecalciferol oral tablet: 2000 unit(s) orally once a day  gabapentin 400 mg oral capsule: 1 cap(s) orally 3 times a day  polyethylene glycol 3350 oral powder for reconstitution: 17 gram(s) orally 2 times a day, As needed, Constipation

## 2022-11-11 NOTE — DISCHARGE NOTE PROVIDER - CARE PROVIDER_API CALL
Sivakumar Mays)  Neurosurgery  [address] [facility], [address]  [place] [address]  Phone: [phone]  Fax: [phone]  Established Patient  Follow Up Time:    Sivakumar Mays)  Neurosurgery  [address] [facility] [address], [address]  [place] [address]  Phone: [phone]  Fax: [phone]  Established Patient  Follow Up Time:     Tonya Guzman; MPH)  Surgery  Phys Medicine  [facility]  [address] [place] [address]  Phone: [phone]  Fax: [phone]  Follow Up Time: 1 month

## 2022-11-11 NOTE — DISCHARGE NOTE PROVIDER - PROVIDER TOKENS
PROVIDER:[TOKEN:[[id number]],ESTABLISHEDPATIENT:[T]] PROVIDER:[TOKEN:[[id number]],ESTABLISHEDPATIENT:[T]],PROVIDER:[TOKEN:[[id number]],FOLLOWUP:[1 month]]

## 2022-11-11 NOTE — DISCHARGE NOTE PROVIDER - HOSPITAL COURSE
Ms. Scott is a 63 yo F who transferred [facility] on 10/30/22 s/p mechanical fall while in her driveway for MRI which which showed cord compression at C3/C4 and C4/C5 levels. Admitted for multidisciplinary rehab program for functional deficits pertaining to Central Cord Syndrome      Rehabilitation course:  --Erythematous rash on upper back responded to topical steroid cream  --Marked functional improvement, lower extremity function improved, ambulating increasing distance without gait device, with good safety awareness  --Neuropathic symptoms of upper extremities and motor function improved, marked improvement of fine motor activity           Ms. Scott is a 61 yo F who transferred [facility] on 10/30/22 s/p mechanical fall while in her driveway for MRI which which showed cord compression at C3/C4 and C4/C5 levels. Admitted for multidisciplinary rehab program for functional deficits pertaining to Central Cord Syndrome      Rehabilitation course:  --Erythematous rash on upper back responded to topical steroid cream  --Marked functional improvement, lower extremity function improved, ambulating increasing distance without gait device, with good safety awareness  --Neuropathic symptoms of upper extremities and motor function improved, marked improvement of fine motor activity  --Tolerating neuropathic meds without overt side effects  -Spasms resolved, no longer requiring antimuscarinics  --C collar use to continue for 4-6 wks from surgery date as confirmed with surgeon  -- was involved during patient's care and discharge plan discussed with him., He was happy with same   Functional status at last IDT 11/9  TDD:11/16, revised to 11/12 as requested by patient and approved by team considering her consistent functional improvement  Barriers; Neuropathic pain  SW: Lives in  with , 9 CRYSTAL, 1 HR with 1st floor living  OT: Min A for ADLs, CG for transfers  PT: Amb  ft with no device. CS for transfers. Negotiated 12 steps with 1 HR.   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 Scott is a 63 yo F who transferred [facility] on 10/30/22 s/p mechanical fall while in her driveway for MRI which which showed cord compression at C3/C4 and C4/C5 levels. Admitted for multidisciplinary rehab program for functional deficits pertaining to Central Cord Syndrome      Rehabilitation course:  --Erythematous rash on upper back responded to topical steroid cream  --Marked functional improvement, lower extremity function improved, ambulating increasing distance without gait device, with good safety awareness  --Neuropathic symptoms of upper extremities and motor function improved, marked improvement of fine motor activity  --Tolerating neuropathic meds without overt side effects  -Spasms resolved, no longer requiring antimuscarinics  --C collar use to continue for 4-6 wks from surgery date as confirmed with surgeon  -- was involved during patient's care and discharge plan discussed with him., He was happy with same   Functional status at last     IDT 11/9  TDD:11/16, revised to 11/12 as requested by patient and approved by team considering her consistent functional improvement  Barriers; Neuropathic pain  SW: Lives in  with , 9 CRYSTAL, 1 HR with 1st floor living  OT: Min A for ADLs, CG for transfers  PT: Amb  ft with no device. CS for transfers. Negotiated 12 steps with 1 HR.   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

## 2022-11-11 NOTE — PROGRESS NOTE ADULT - SUBJECTIVE AND OBJECTIVE BOX
Subjective  Seen and examined, at her bed side.  Found walking independently in room this AM.  Appears well overall. States that she slept well and is feeling good this AM.   Her hand sensitivity is improving and her numbness and tingling is now only present in her BL thumbs.  She is finding that she is able to do much more with her hand compared to yesterday.  Her rash is resolved.  Last BM on 11/11  Plan for DC 11/12.  Requesting work letter- which was provided.     ROS: Denies headache, dizziness, chest pain, SOB, nausea, vomiting, diarrhea.     Vital Signs Last 24 Hrs  T(C): 36.4 (11 Nov 2022 09:15), Max: 36.8 (10 Nov 2022 20:55)  T(F): 97.6 (11 Nov 2022 09:15), Max: 98.2 (10 Nov 2022 20:55)  HR: 61 (11 Nov 2022 09:15) (61 - 68)  BP: 121/70 (11 Nov 2022 09:15) (110/68 - 121/70)  BP(mean): --  RR: 18 (11 Nov 2022 09:15) (16 - 18)  SpO2: 97% (11 Nov 2022 09:15) (96% - 97%)    Physical Exam:   Constitutional - Comfortable  HEENT - NAD  Neck - Supple, No limited ROM  Chest - Clear   Cardio - HS I and II  Abdomen -  Soft, non tender  Extremities - No peripheral edema, No calf tenderness     Neurologic Exam:                 No cognitive deficits  Cranial Nerves - No facial asymmetry, Tongue midline, EOMI, Shoulder shrug intact  Allodynia over fingers,much reduced  Motor -  LE 5/5 UE 3+/5 finger flexion/wrist extension,   Improved ROM hands     Sensory - Intact to LT bilateral, allodynia in C6 dermatome,     Reflexes - DTR intact     Coordination - FTN intact     OculoVestibular -  No nystagmus  Psychiatric - Mood stable, Affect WNL  Skin--erythematous rash upper back, no vesicles     RECENT LABS/IMAGING                        12.8   7.22  )-----------( 319      ( 10 Nov 2022 06:36 )             39.2     11-10    142  |  109<H>  |  16  ----------------------------<  87  3.8   |  24  |  0.68    Ca    9.1      10 Nov 2022 06:36    TPro  6.3  /  Alb  3.2<L>  /  TBili  0.4  /  DBili  x   /  AST  27  /  ALT  76<H>  /  AlkPhos  84  11-10    MEDICATIONS  (STANDING):  cholecalciferol 2000 Unit(s) Oral daily  enoxaparin Injectable 40 milliGRAM(s) SubCutaneous every 24 hours  gabapentin 400 milliGRAM(s) Oral three times a day  methocarbamol 750 milliGRAM(s) Oral every 8 hours  mupirocin 2% Ointment 1 Application(s) Topical two times a day    MEDICATIONS  (PRN):  acetaminophen     Tablet .. 650 milliGRAM(s) Oral every 6 hours PRN Moderate Pain (4 - 6)  polyethylene glycol 3350 17 Gram(s) Oral two times a day PRN Constipation  traMADol 25 milliGRAM(s) Oral every 4 hours PRN Moderate Pain (4 - 6)  traMADol 50 milliGRAM(s) Oral every 4 hours PRN Severe Pain (7 - 10)

## 2022-11-11 NOTE — CHART NOTE - NSCHARTNOTEFT_GEN_A_CORE
2022      Lynne Scott  : 1960      To Whom It May Concern:    Mrs. BETSY Scott was admitted to [facility] from  for rehabilitative services under the supervision of Dr. Tonya Guzman.  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  Please feel free to contact me with any further questions or concerns you may have.             Elvia La, [facility]  Physical Medicine & Rehab  [address] [place] [address]. [id number]  [phone]

## 2022-11-11 NOTE — DISCHARGE NOTE PROVIDER - NSDCACTIVITY_GEN_ALL_CORE
Do not drive or operate machinery/Showering allowed/Do not make important decisions/Walking - Indoors allowed/No heavy lifting/straining/Follow Instructions Provided by your Surgical Team

## 2022-11-11 NOTE — PROGRESS NOTE ADULT - SUBJECTIVE AND OBJECTIVE BOX
Patient is a 62y old  Female who presents with a chief complaint of Cervical spine central cord syndrome post fall (11 Nov 2022 08:00)      SUBJECTIVE / OVERNIGHT EVENTS:  Pt seen and examined at bedside. No acute events overnight.  Pt denies cp, palpitations, sob, abd pain, N/V, fever, chills.    ROS:  All other review of systems negative    Allergies    Cilantro (Unknown)  Demerol (Unknown)    Intolerances        MEDICATIONS  (STANDING):  cholecalciferol 2000 Unit(s) Oral daily  cyanocobalamin Injectable 1000 MICROGram(s) IntraMuscular once  enoxaparin Injectable 40 milliGRAM(s) SubCutaneous every 24 hours  gabapentin 400 milliGRAM(s) Oral three times a day  methocarbamol 750 milliGRAM(s) Oral every 8 hours  mupirocin 2% Ointment 1 Application(s) Topical two times a day    MEDICATIONS  (PRN):  acetaminophen     Tablet .. 650 milliGRAM(s) Oral every 6 hours PRN Moderate Pain (4 - 6)  polyethylene glycol 3350 17 Gram(s) Oral two times a day PRN Constipation  traMADol 25 milliGRAM(s) Oral every 4 hours PRN Moderate Pain (4 - 6)  traMADol 50 milliGRAM(s) Oral every 4 hours PRN Severe Pain (7 - 10)      Vital Signs Last 24 Hrs  T(C): 36.8 (10 Nov 2022 20:55), Max: 36.8 (10 Nov 2022 20:55)  T(F): 98.2 (10 Nov 2022 20:55), Max: 98.2 (10 Nov 2022 20:55)  HR: 61 (10 Nov 2022 20:55) (61 - 68)  BP: 110/68 (10 Nov 2022 20:55) (110/68 - 118/73)  BP(mean): --  RR: 16 (10 Nov 2022 20:55) (16 - 16)  SpO2: 96% (10 Nov 2022 20:55) (96% - 97%)    Parameters below as of 10 Nov 2022 20:55  Patient On (Oxygen Delivery Method): room air      CAPILLARY BLOOD GLUCOSE        I&O's Summary      PHYSICAL EXAM:  GENERAL: NAD, well-developed female  HEAD:  Atraumatic, Normocephalic  EYES: EOMI, PERRLA, conjunctiva and sclera clear  NECK: C-collar  CHEST/LUNG: Clear to auscultation bilaterally; No wheeze, nonlabored breathing  HEART: Regular rate and rhythm; No murmurs, rubs, or gallops  ABDOMEN: Soft, Nontender, Nondistended; Bowel sounds present  EXTREMITIES:  2+ Peripheral Pulses, No clubbing, cyanosis, or edema  NEUROLOGY: AAOx3, b/l UE weakness   PSYCH: calm, appropriate mood    LABS:                        12.8   7.22  )-----------( 319      ( 10 Nov 2022 06:36 )             39.2     11-10    142  |  109<H>  |  16  ----------------------------<  87  3.8   |  24  |  0.68    Ca    9.1      10 Nov 2022 06:36    TPro  6.3  /  Alb  3.2<L>  /  TBili  0.4  /  DBili  x   /  AST  27  /  ALT  76<H>  /  AlkPhos  84  11-10              RADIOLOGY & ADDITIONAL TESTS:  Results Reviewed:   Imaging Personally Reviewed:  Electrocardiogram Personally Reviewed:    COORDINATION OF CARE:  Care Discussed with Consultants/Other Providers [Y/N]:  Prior or Outpatient Records Reviewed [Y/N]:

## 2022-11-11 NOTE — DISCHARGE NOTE PROVIDER - NSDCCPCAREPLAN_GEN_ALL_CORE_FT
PRINCIPAL DISCHARGE DIAGNOSIS  Diagnosis: Central cord syndrome of cervical spinal cord  Assessment and Plan of Treatment: Continue Cervical collar at all time. Spinal precautions, F/U with neurosurgeon

## 2022-11-11 NOTE — DISCHARGE NOTE PROVIDER - CARE PROVIDERS DIRECT ADDRESSES
[email or web address]

## 2022-11-12 ENCOUNTER — TRANSCRIPTION ENCOUNTER (OUTPATIENT)
Age: 62
End: 2022-11-12

## 2022-11-12 VITALS
SYSTOLIC BLOOD PRESSURE: 113 MMHG | TEMPERATURE: 99 F | OXYGEN SATURATION: 96 % | HEART RATE: 73 BPM | RESPIRATION RATE: 18 BRPM | DIASTOLIC BLOOD PRESSURE: 77 MMHG

## 2022-11-12 LAB
A1C WITH ESTIMATED AVERAGE GLUCOSE RESULT: 5.3 % — SIGNIFICANT CHANGE UP (ref 4–5.6)
CHOLEST SERPL-MCNC: 157 MG/DL — SIGNIFICANT CHANGE UP
ESTIMATED AVERAGE GLUCOSE: 105 MG/DL — SIGNIFICANT CHANGE UP (ref 68–114)
GLUCOSE SERPL-MCNC: 88 MG/DL — SIGNIFICANT CHANGE UP (ref 70–99)
HDLC SERPL-MCNC: 36 MG/DL — LOW
LIPID PNL WITH DIRECT LDL SERPL: 83 MG/DL — SIGNIFICANT CHANGE UP
NON HDL CHOLESTEROL: 121 MG/DL — SIGNIFICANT CHANGE UP
TRIGL SERPL-MCNC: 194 MG/DL — HIGH

## 2022-11-12 PROCEDURE — 83525 ASSAY OF INSULIN: CPT

## 2022-11-12 PROCEDURE — 97163 PT EVAL HIGH COMPLEX 45 MIN: CPT

## 2022-11-12 PROCEDURE — 85027 COMPLETE CBC AUTOMATED: CPT

## 2022-11-12 PROCEDURE — 82306 VITAMIN D 25 HYDROXY: CPT

## 2022-11-12 PROCEDURE — U0003: CPT

## 2022-11-12 PROCEDURE — 85025 COMPLETE CBC W/AUTO DIFF WBC: CPT

## 2022-11-12 PROCEDURE — 97533 SENSORY INTEGRATION: CPT

## 2022-11-12 PROCEDURE — 97112 NEUROMUSCULAR REEDUCATION: CPT

## 2022-11-12 PROCEDURE — 80061 LIPID PANEL: CPT

## 2022-11-12 PROCEDURE — 97034 APP MDLTY 1+CNTRST BTH EA 15: CPT

## 2022-11-12 PROCEDURE — 97110 THERAPEUTIC EXERCISES: CPT

## 2022-11-12 PROCEDURE — 80053 COMPREHEN METABOLIC PANEL: CPT

## 2022-11-12 PROCEDURE — 83036 HEMOGLOBIN GLYCOSYLATED A1C: CPT

## 2022-11-12 PROCEDURE — 83527 ASSAY OF INSULIN: CPT

## 2022-11-12 PROCEDURE — 87635 SARS-COV-2 COVID-19 AMP PRB: CPT

## 2022-11-12 PROCEDURE — 97124 MASSAGE THERAPY: CPT

## 2022-11-12 PROCEDURE — U0005: CPT

## 2022-11-12 PROCEDURE — 97535 SELF CARE MNGMENT TRAINING: CPT

## 2022-11-12 PROCEDURE — 97116 GAIT TRAINING THERAPY: CPT

## 2022-11-12 PROCEDURE — 97167 OT EVAL HIGH COMPLEX 60 MIN: CPT

## 2022-11-12 PROCEDURE — 82947 ASSAY GLUCOSE BLOOD QUANT: CPT

## 2022-11-12 PROCEDURE — 86803 HEPATITIS C AB TEST: CPT

## 2022-11-12 PROCEDURE — 82607 VITAMIN B-12: CPT

## 2022-11-12 PROCEDURE — 99232 SBSQ HOSP IP/OBS MODERATE 35: CPT

## 2022-11-12 PROCEDURE — 97530 THERAPEUTIC ACTIVITIES: CPT

## 2022-11-12 PROCEDURE — 36415 COLL VENOUS BLD VENIPUNCTURE: CPT

## 2022-11-12 RX ADMIN — GABAPENTIN 400 MILLIGRAM(S): 400 CAPSULE ORAL at 05:59

## 2022-11-12 RX ADMIN — MUPIROCIN 1 APPLICATION(S): 20 OINTMENT TOPICAL at 08:38

## 2022-11-12 RX ADMIN — METHOCARBAMOL 750 MILLIGRAM(S): 500 TABLET, FILM COATED ORAL at 05:59

## 2022-11-12 NOTE — DISCHARGE NOTE NURSING/CASE MANAGEMENT/SOCIAL WORK - PATIENT PORTAL LINK FT
You can access the FollowMyHealth Patient Portal offered by [facility] by registering at the following website: [email or web address]. By joining [facility]’s FollowMyHealth portal, you will also be able to view your health information using other applications (apps) compatible with our system.

## 2022-11-12 NOTE — PROGRESS NOTE ADULT - SUBJECTIVE AND OBJECTIVE BOX
Patient is a 62y old  Female who presents with a chief complaint of Cervical spine stenosis with cord compression (11 Nov 2022 10:03)      SUBJECTIVE / OVERNIGHT EVENTS:  Pt seen and examined at bedside. No acute events overnight.  Pt denies cp, palpitations, sob, abd pain, N/V, fever, chills.    ROS:  All other review of systems negative    Allergies    Cilantro (Unknown)  Demerol (Unknown)    Intolerances        MEDICATIONS  (STANDING):  cholecalciferol 2000 Unit(s) Oral daily  enoxaparin Injectable 40 milliGRAM(s) SubCutaneous every 24 hours  gabapentin 400 milliGRAM(s) Oral three times a day  methocarbamol 750 milliGRAM(s) Oral every 8 hours  mupirocin 2% Ointment 1 Application(s) Topical two times a day    MEDICATIONS  (PRN):  acetaminophen     Tablet .. 650 milliGRAM(s) Oral every 6 hours PRN Moderate Pain (4 - 6)  polyethylene glycol 3350 17 Gram(s) Oral two times a day PRN Constipation  traMADol 25 milliGRAM(s) Oral every 4 hours PRN Moderate Pain (4 - 6)  traMADol 50 milliGRAM(s) Oral every 4 hours PRN Severe Pain (7 - 10)      Vital Signs Last 24 Hrs  T(C): 36.6 (11 Nov 2022 19:56), Max: 36.6 (11 Nov 2022 19:56)  T(F): 97.9 (11 Nov 2022 19:56), Max: 97.9 (11 Nov 2022 19:56)  HR: 66 (11 Nov 2022 19:56) (61 - 66)  BP: 127/79 (11 Nov 2022 19:56) (121/70 - 127/79)  BP(mean): --  RR: 16 (11 Nov 2022 19:56) (16 - 18)  SpO2: 94% (11 Nov 2022 19:56) (94% - 97%)    Parameters below as of 11 Nov 2022 19:56  Patient On (Oxygen Delivery Method): room air      CAPILLARY BLOOD GLUCOSE        I&O's Summary      PHYSICAL EXAM:  GENERAL: NAD, well-developed female  HEAD:  Atraumatic, Normocephalic  NECK: C-collar  CHEST/LUNG: Clear to auscultation bilaterally; No wheeze, nonlabored breathing  HEART: Regular rate and rhythm; No murmurs, rubs, or gallops  ABDOMEN: Soft, Nontender, Nondistended; Bowel sounds present  EXTREMITIES:  2+ Peripheral Pulses, No clubbing, cyanosis, or edema  NEUROLOGY: AAOx3, b/l UE weakness   PSYCH: calm, appropriate mood      LABS:    11-12    x   |  x   |  x   ----------------------------<  88  x    |  x   |  x                   RADIOLOGY & ADDITIONAL TESTS:  Results Reviewed:   Imaging Personally Reviewed:  Electrocardiogram Personally Reviewed:    COORDINATION OF CARE:  Care Discussed with Consultants/Other Providers [Y/N]:  Prior or Outpatient Records Reviewed [Y/N]:

## 2022-11-12 NOTE — PROGRESS NOTE ADULT - PROVIDER SPECIALTY LIST ADULT
Rehab Medicine
Rehab Medicine
Hospitalist
Hospitalist
Rehab Medicine
Hospitalist
Hospitalist
Rehab Medicine
Hospitalist
Hospitalist
Rehab Medicine
Rehab Medicine

## 2022-11-12 NOTE — DISCHARGE NOTE NURSING/CASE MANAGEMENT/SOCIAL WORK - NSDCPEFALRISK_GEN_ALL_CORE
For information on Fall & Injury Prevention, visit: [email or web address] OR  [email or web address] OR  https://www.cdc.gov/steadi/patient.html

## 2022-11-12 NOTE — PROGRESS NOTE ADULT - SUBJECTIVE AND OBJECTIVE BOX
Cc: Gait dysfunction    HPI: Patient with no new medical issues overnight.  Pain controlled, no chest pain, no N/V, no Fevers/Chills. No other new ROS  Has been tolerating rehabilitation program.    acetaminophen     Tablet .. 650 milliGRAM(s) Oral every 6 hours PRN  cholecalciferol 2000 Unit(s) Oral daily  enoxaparin Injectable 40 milliGRAM(s) SubCutaneous every 24 hours  gabapentin 400 milliGRAM(s) Oral three times a day  methocarbamol 750 milliGRAM(s) Oral every 8 hours  mupirocin 2% Ointment 1 Application(s) Topical two times a day  polyethylene glycol 3350 17 Gram(s) Oral two times a day PRN  traMADol 25 milliGRAM(s) Oral every 4 hours PRN  traMADol 50 milliGRAM(s) Oral every 4 hours PRN      T(C): 37 (11-12-22 @ 08:38), Max: 37 (11-12-22 @ 08:38)  HR: 73 (11-12-22 @ 08:38) (73 - 73)  BP: 113/77 (11-12-22 @ 08:38) (113/77 - 113/77)  RR: 18 (11-12-22 @ 08:38) (18 - 18)  SpO2: 96% (11-12-22 @ 08:38) (96% - 96%)    In NAD  HEENT- EOMI  Heart- RRR, S1S2  Lungs- CTA bl.  Abd- + BS, NT  Ext- No calf pain  Neuro- Exam unchanged

## 2022-11-12 NOTE — PROGRESS NOTE ADULT - ASSESSMENT
Imp: Patient with diagnosis of    cervical stenosis with myelopathy      admitted for comprehensive acute rehabilitation.    Plan:  - Continue PT/OT/SLP  - DVT prophylaxis  - Skin- Turn q2h, check skin daily  - Continue current medications; patient medically stable.   -Active issues-   - Patient is stable to continue current rehabilitation program.

## 2022-11-12 NOTE — PROGRESS NOTE ADULT - REASON FOR ADMISSION
Cervical spine stenosis with cord compression
rehab
Cervical spine stenosis
rehab
rehab
Cervical spine stenosis with cord compression
Cervical spine stenosis with cord compression
rehab

## 2022-11-12 NOTE — PROGRESS NOTE ADULT - ASSESSMENT
63 y/o F with PMH melanoma excision with right arm radiculopathy, who transferred [facility] on 10/30/22 s/p mechanical fall while in her driveway. MRI c/w cord compression at C3/C4 and C4/C5 levels. Admitted for multidisciplinary rehab program for functional deficits pertaining to Central Cord Syndrome.    #Central Cord Syndrome  #s/p Fall  #Neck spasms  #Neuropathy  -Maintain C-Collar per neurosurgery recc  -PT/OT/SLP per rehab team  -Continue Gabapentin 400mg TID    #Vit D Insufficiency  -Continue Vit D 2000units per day    #Elevated LFTs  -Resolved  -Monitor, encourage PO hydration, limit hepatotoxins    #Hx of B12 deficiency  -No acute concern for anemia  -B12 levels normal, however, pt states her goal is supposed to be >800  -States she cannot take PO due to possible/potential diagnosis of Pernicious anemia  -S/p B12 1000mcg inj x once yesterday. Outpatient continuation per primary    #DVT ppx - Lovenox    Stable for discharge

## 2022-11-13 LAB — SARS-COV-2 RNA SPEC QL NAA+PROBE: SIGNIFICANT CHANGE UP

## 2022-11-17 ENCOUNTER — OFFICE (OUTPATIENT)
Dept: URBAN - METROPOLITAN AREA CLINIC 97 | Facility: CLINIC | Age: 62
Setting detail: OPHTHALMOLOGY
End: 2022-11-17
Payer: COMMERCIAL

## 2022-11-17 DIAGNOSIS — H51.11: ICD-10-CM

## 2022-11-17 DIAGNOSIS — S06.0X0A: ICD-10-CM

## 2022-11-17 DIAGNOSIS — H40.033: ICD-10-CM

## 2022-11-17 DIAGNOSIS — H26.492: ICD-10-CM

## 2022-11-17 DIAGNOSIS — Z96.1: ICD-10-CM

## 2022-11-17 DIAGNOSIS — H55.82: ICD-10-CM

## 2022-11-17 PROBLEM — Z00.00 ENCOUNTER FOR PREVENTIVE HEALTH EXAMINATION: Status: ACTIVE | Noted: 2022-11-17

## 2022-11-17 PROCEDURE — 92004 COMPRE OPH EXAM NEW PT 1/>: CPT | Performed by: OPHTHALMOLOGY

## 2022-11-17 PROCEDURE — 92083 EXTENDED VISUAL FIELD XM: CPT | Performed by: OPHTHALMOLOGY

## 2022-11-17 PROCEDURE — 92060 SENSORIMOTOR EXAMINATION: CPT | Performed by: OPHTHALMOLOGY

## 2022-11-17 RX ORDER — GABAPENTIN 400 MG/1
1 CAPSULE ORAL
Qty: 0 | Refills: 0 | DISCHARGE

## 2022-11-17 ASSESSMENT — CONFRONTATIONAL VISUAL FIELD TEST (CVF)
OD_FINDINGS: FULL
OS_FINDINGS: FULL

## 2022-11-17 ASSESSMENT — VISUAL ACUITY
OS_BCVA: 20/25-
OD_BCVA: 20/30

## 2022-11-18 LAB
INSULIN FREE SERPL-ACNC: 6.6 UU/ML — SIGNIFICANT CHANGE UP
INSULIN: 6.6 UU/ML — SIGNIFICANT CHANGE UP

## 2022-11-22 ENCOUNTER — NON-APPOINTMENT (OUTPATIENT)
Age: 62
End: 2022-11-22

## 2022-11-23 ENCOUNTER — APPOINTMENT (OUTPATIENT)
Dept: NEUROSURGERY | Facility: CLINIC | Age: 62
End: 2022-11-23

## 2022-11-23 ENCOUNTER — NON-APPOINTMENT (OUTPATIENT)
Age: 62
End: 2022-11-23

## 2022-11-23 ENCOUNTER — TRANSCRIPTION ENCOUNTER (OUTPATIENT)
Age: 62
End: 2022-11-23

## 2022-11-23 VITALS
BODY MASS INDEX: 31.65 KG/M2 | OXYGEN SATURATION: 98 % | TEMPERATURE: 98.7 F | DIASTOLIC BLOOD PRESSURE: 85 MMHG | WEIGHT: 190 LBS | HEART RATE: 69 BPM | HEIGHT: 65 IN | SYSTOLIC BLOOD PRESSURE: 140 MMHG

## 2022-11-23 DIAGNOSIS — M48.02 SPINAL STENOSIS, CERVICAL REGION: ICD-10-CM

## 2022-11-23 PROCEDURE — 99213 OFFICE O/P EST LOW 20 MIN: CPT

## 2022-11-23 NOTE — REVIEW OF SYSTEMS
[As Noted in HPI] : as noted in HPI [Arm Weakness] : arm weakness [Hand Weakness] :  hand weakness [Numbness] : numbness [Tingling] : tingling [Hypersensitivity] : hypersensitivity [Negative] : Heme/Lymph

## 2022-12-04 NOTE — ASSESSMENT
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  MRI C-spine without contrast obtained on 10/30/2022 demonstrates C3-4 and C4-5 disc herniation with associated reversal of lordosis causing evidence of cord compression without cord signal change.  Due to improving symptomatology and Katie TolentinoTyler's desire to avoid surgery if possible, she was managed conservatively with Lares J collar.  She reports now that she continues to see overall improvement in her symptoms with some residual bilateral thumb and forefinger hypersensitivity and mild numbness and tingling.  She reports that the burning sensation she had previously has much improved to gabapentin which she is currently taking 400 mg 3 times daily.  We again discussed the treatment options for her symptoms which include continued conservative nonsurgical management at this time versus surgical management which would include a C3-4, C4-5 ACDF.  We discussed that since her symptoms seem to be improving it would be prudent to continue nonsurgical management at this time.  We discussed having her wean herself out of the collar at this time and continue to watch out for any worsening symptoms or warning signs such as weakness, worsening symptoms in the arms hands and fingers, worsening fine motor task ability including difficulty buttoning buttons and applying jewelry or frequent falls and gait instability.  We discussed having her follow-up in 3 months time for further myelopathy check.  Patient expressed understanding and agreed with the plan and all questions were answered.\par \par Plan:\par – Wean out of Lares J collar\par – Avoid bending, lifting, twisting if possible\par – Follow-up PT/OT\par – Follow-up in 3 months or earlier should any new symptoms arise or current symptoms worsen

## 2022-12-04 NOTE — HISTORY OF PRESENT ILLNESS
[FreeTextEntry1] :  Lynne Tyler is a very pleasant 62 year old right handed female who presents for hospital follow up after suffering a fall causing C3, 4, C5, 6 disc herniation.\par On 10/30/2022, she was transferred from [facility] after suffering a mechanical fall after tripping in her driveway. She fell and hit her head and face on ground with brief LOC. She was not taking ASA or AC at the time. \par She presented to [facility] with complaints of bilateral numbness/tingling in hands, as well as RLE numbness/weakness (she does endorse previous  radiculopathy s/p melanoma excision on right arm). CT head/max/face/ A/P negative for any acute traumatic injuries. CT C-spine demonstrates C4/C5 spinal stenosis. Patient was transferred to [facility] for MRI, and further evaluation. On presentation, patient primary intact, GCS 15. \par She had sensory defect in bilateral hands. Otherwise, intact. \par MRI showed cord compression at C3 C4, C4 C5.\par Symptoms consistent with central cord syndrome. As per Neurosurgery, it was recommended to keep C collar at all times. Patient had some improvement with symptoms. \par She was discharged to [facility] on 11/5/2022. \par She showed marked functional improvement in upper extremities and bilateral hand dexterity and fine  motor skills. Improved improvement in gait and motor function. She was receiving OT 4 hours a day. \par She maintained the collar on at all times. She was compliant with Gabapentin for neuropathic pain and tolerating. \par \par Today, she presents with her . She states she sees an overall improvement in her symptoms. She states bilateral thumbs and forefinger are hypersensitive, with mild numbness and tingling, but have certainly improved with time. She feels she possibly plateaued hoping to have an improvement in symptoms. She also states she has mild burning sensation in fingers and Gabapentin has been of some help. She is now taking 400 mg TID since 11/7/2022.She is able to type, but still having some difficulty with buttoning and applying jewelry. \par She has been wearing the C collar 24/7 with some mild neck stiffness from brace. She denies neck pain, but states her neck muscles are strained. No shooting pain down arms to hands. \par She denies headaches or weakness in upper and lower extremities. She has previous radiculopathy in right arm which is unchanged. 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 Indications for ACDF include, weakness, worsening symptoms in arms, hands and fingers,  difficulty buttoning and applying jewelry, frequent falls and gait instability. \par Plan: Can keep collar off\par No bending, lifting heavy objects, or twisting\par PT\par OT\par follow up in 3 months

## 2022-12-04 NOTE — PHYSICAL EXAM
[FreeTextEntry1] : awake, alert\par interactive, appropriate\par No pronator drift\par RUE 5/5 D/B/T/WE/WF//IO4-4+\par LUE 5/5 D/B/T/WE/WF//IO4-4+\par RLE 5/5 HF/KE/KF/DF/PF/EHL\par LLE 5/5 HF/KE/KF/DF/PF/EHL\par SILT\par negative Cuenca's bilaterally\par negative clonus bilaterally\par narrow-based gait wnl\par reflexes 3+\par \par \par

## 2023-01-25 NOTE — DISCHARGE NOTE PROVIDER - NSCORESITESY/N_GEN_A_CORE_RD
Return in 5 days for suture removal. Return to the emergency department for redness, swelling, drainage or for any other new or worrisome symptoms.      Yes

## 2023-03-13 PROBLEM — Z98.890 OTHER SPECIFIED POSTPROCEDURAL STATES: Chronic | Status: ACTIVE | Noted: 2022-10-30

## 2023-03-15 ENCOUNTER — APPOINTMENT (OUTPATIENT)
Dept: NEUROSURGERY | Facility: CLINIC | Age: 63
End: 2023-03-15
Payer: COMMERCIAL

## 2023-03-15 DIAGNOSIS — M50.20 OTHER CERVICAL DISC DISPLACEMENT, UNSPECIFIED CERVICAL REGION: ICD-10-CM

## 2023-03-15 PROCEDURE — 99215 OFFICE O/P EST HI 40 MIN: CPT | Mod: 95

## 2023-03-15 NOTE — HISTORY OF PRESENT ILLNESS
[FreeTextEntry1] :  Lynne Tyler is a very pleasant 62 year old right handed female who presents for follow up after suffering a fall causing C3, 4, C5, 6 disc herniation.\par She suffered a mechanical fall after tripping in her driveway, fell and hit her head and face on ground with a brief LOC. \par She initially presented with complaints of bilateral numbness/tingling in hands, as well as RLE numbness/weakness. CT C-spine demonstrated C4/C5 spinal stenosis. \par She had sensory defect in bilateral hands. Otherwise, intact. \par MRI showed cord compression at C3 C4, C4 C5.\par Symptoms consistent with central cord syndrome. She was placed in C collar and had some improvement with symptoms. \par She was discharged to [facility] on 11/5/2022. \par She showed marked functional improvement in upper extremities and bilateral hand dexterity and fine motor skills. Improved improvement in gait and motor function. \par She endorses residual left thumb hypersensitivity with mild numbness and tingling. She takes Gabapentin 400 mg prn for neuropathic pain. She denies weakness in upper extremities and hands, worsening symptoms in arms, hands and fingers, worsening fine motor skills including difficulty buttoning or applying jewelry. She denies frequent falls or gait instability. She has completed OT and PT and performs exercises on her own.\par Concussive symptoms have resolved. \par Plan: FU \par

## 2023-03-15 NOTE — REASON FOR VISIT
[Home] : at home, [unfilled] , at the time of the visit. [Medical Office: ([facility])___] : at the medical office located in  [Patient] : the patient [This encounter was initiated by telehealth (audio with video) and converted to telephone (audio only) due to technical difficulties.] : This encounter was initiated by telehealth (audio with video) and converted to telephone (audio only) due to technical difficulties. [Follow-Up: _____] : a [unfilled] follow-up visit

## 2025-05-08 NOTE — PATIENT PROFILE ADULT - FUNCTIONAL ASSESSMENT - DAILY ACTIVITY 5.
Quality 130: Documentation Of Current Medications In The Medical Record: Current Medications Documented Quality 155: Falls Plan Of Care: Falls plan of care documented Detail Level: Detailed Quality 47: Advance Care Plan: Advance Care Planning discussed and documented; advance care plan or surrogate decision maker documented in the medical record. 4 = No assist / stand by assistance